# Patient Record
Sex: FEMALE | Race: WHITE | NOT HISPANIC OR LATINO | Employment: FULL TIME | URBAN - METROPOLITAN AREA
[De-identification: names, ages, dates, MRNs, and addresses within clinical notes are randomized per-mention and may not be internally consistent; named-entity substitution may affect disease eponyms.]

---

## 2017-01-24 ENCOUNTER — HOSPITAL ENCOUNTER (EMERGENCY)
Facility: HOSPITAL | Age: 22
Discharge: HOME | End: 2017-01-24
Attending: EMERGENCY MEDICINE | Admitting: EMERGENCY MEDICINE
Payer: COMMERCIAL

## 2017-01-24 ENCOUNTER — APPOINTMENT (EMERGENCY)
Dept: RADIOLOGY | Facility: HOSPITAL | Age: 22
End: 2017-01-24
Payer: COMMERCIAL

## 2017-01-24 VITALS
SYSTOLIC BLOOD PRESSURE: 131 MMHG | HEART RATE: 81 BPM | DIASTOLIC BLOOD PRESSURE: 74 MMHG | WEIGHT: 195.55 LBS | TEMPERATURE: 97.3 F | RESPIRATION RATE: 16 BRPM | OXYGEN SATURATION: 100 %

## 2017-01-24 DIAGNOSIS — R00.2 PALPITATIONS: ICD-10-CM

## 2017-01-24 DIAGNOSIS — R07.9 CHEST PAIN, UNSPECIFIED TYPE: Primary | ICD-10-CM

## 2017-01-24 DIAGNOSIS — R06.00 DYSPNEA, UNSPECIFIED TYPE: ICD-10-CM

## 2017-01-24 LAB
ANION GAP SERPL CALCULATED.3IONS-SCNC: 11 MMOL/L (ref 4–13)
ATRIAL RATE: 101 BPM
BUN SERPL-MCNC: 14 MG/DL (ref 5–25)
CALCIUM SERPL-MCNC: 9.3 MG/DL (ref 8.3–10.1)
CHLORIDE SERPL-SCNC: 104 MMOL/L (ref 100–108)
CO2 SERPL-SCNC: 24 MMOL/L (ref 21–32)
CREAT SERPL-MCNC: 0.86 MG/DL (ref 0.6–1.3)
DEPRECATED D DIMER PPP: <270 NG/ML (FEU) (ref 0–424)
GFR SERPL CREATININE-BSD FRML MDRD: >60 ML/MIN/1.73SQ M
GLUCOSE SERPL-MCNC: 89 MG/DL (ref 65–140)
P AXIS: 34 DEGREES
POTASSIUM SERPL-SCNC: 4.1 MMOL/L (ref 3.5–5.3)
PR INTERVAL: 144 MS
QRS AXIS: 44 DEGREES
QRSD INTERVAL: 80 MS
QT INTERVAL: 334 MS
QTC INTERVAL: 422 MS
SODIUM SERPL-SCNC: 139 MMOL/L (ref 136–145)
T WAVE AXIS: 21 DEGREES
TROPONIN I SERPL-MCNC: <0.02 NG/ML
VENTRICULAR RATE: 96 BPM

## 2017-01-24 PROCEDURE — 84484 ASSAY OF TROPONIN QUANT: CPT | Performed by: EMERGENCY MEDICINE

## 2017-01-24 PROCEDURE — 99285 EMERGENCY DEPT VISIT HI MDM: CPT

## 2017-01-24 PROCEDURE — 93005 ELECTROCARDIOGRAM TRACING: CPT | Performed by: EMERGENCY MEDICINE

## 2017-01-24 PROCEDURE — 71020 HB CHEST X-RAY 2VW FRONTAL&LATL: CPT

## 2017-01-24 PROCEDURE — 85379 FIBRIN DEGRADATION QUANT: CPT | Performed by: EMERGENCY MEDICINE

## 2017-01-24 PROCEDURE — 80048 BASIC METABOLIC PNL TOTAL CA: CPT | Performed by: EMERGENCY MEDICINE

## 2017-01-24 PROCEDURE — 36415 COLL VENOUS BLD VENIPUNCTURE: CPT | Performed by: EMERGENCY MEDICINE

## 2017-01-24 RX ORDER — SPIRONOLACTONE 100 MG/1
100 TABLET, FILM COATED ORAL DAILY
COMMUNITY
End: 2021-02-17

## 2017-01-24 RX ORDER — NORETHINDRONE ACETATE AND ETHINYL ESTRADIOL 1; .02 MG/1; MG/1
1 TABLET ORAL DAILY
COMMUNITY
End: 2021-02-17

## 2017-05-01 ENCOUNTER — ALLSCRIPTS OFFICE VISIT (OUTPATIENT)
Dept: OTHER | Facility: OTHER | Age: 22
End: 2017-05-01

## 2017-05-13 ENCOUNTER — HOSPITAL ENCOUNTER (EMERGENCY)
Facility: HOSPITAL | Age: 22
Discharge: HOME | End: 2017-05-13
Attending: EMERGENCY MEDICINE | Admitting: EMERGENCY MEDICINE
Payer: COMMERCIAL

## 2017-05-13 VITALS
WEIGHT: 205 LBS | HEIGHT: 66 IN | SYSTOLIC BLOOD PRESSURE: 136 MMHG | DIASTOLIC BLOOD PRESSURE: 82 MMHG | HEART RATE: 91 BPM | BODY MASS INDEX: 32.95 KG/M2 | OXYGEN SATURATION: 99 % | RESPIRATION RATE: 18 BRPM | TEMPERATURE: 98.6 F

## 2017-05-13 DIAGNOSIS — J04.0 LARYNGITIS: Primary | ICD-10-CM

## 2017-05-13 DIAGNOSIS — J06.9 VIRAL URI: ICD-10-CM

## 2017-05-13 PROCEDURE — 99283 EMERGENCY DEPT VISIT LOW MDM: CPT

## 2017-05-13 RX ADMIN — DEXAMETHASONE SODIUM PHOSPHATE 10 MG: 10 INJECTION, SOLUTION INTRAMUSCULAR; INTRAVENOUS at 21:35

## 2018-01-14 VITALS
TEMPERATURE: 98.2 F | HEART RATE: 86 BPM | WEIGHT: 208.11 LBS | DIASTOLIC BLOOD PRESSURE: 88 MMHG | HEIGHT: 65 IN | SYSTOLIC BLOOD PRESSURE: 124 MMHG | BODY MASS INDEX: 34.67 KG/M2

## 2018-12-07 PROCEDURE — 99284 EMERGENCY DEPT VISIT MOD MDM: CPT

## 2018-12-08 ENCOUNTER — HOSPITAL ENCOUNTER (EMERGENCY)
Facility: HOSPITAL | Age: 23
Discharge: HOME | End: 2018-12-08
Attending: EMERGENCY MEDICINE | Admitting: EMERGENCY MEDICINE

## 2018-12-08 VITALS
OXYGEN SATURATION: 100 % | DIASTOLIC BLOOD PRESSURE: 90 MMHG | SYSTOLIC BLOOD PRESSURE: 139 MMHG | RESPIRATION RATE: 20 BRPM | TEMPERATURE: 98 F | HEART RATE: 74 BPM

## 2018-12-08 DIAGNOSIS — N93.9 VAGINAL BLEEDING: Primary | ICD-10-CM

## 2018-12-08 DIAGNOSIS — R11.0 NAUSEA: ICD-10-CM

## 2018-12-08 DIAGNOSIS — R10.9 ABDOMINAL CRAMPING: ICD-10-CM

## 2018-12-08 LAB
ANION GAP BLD CALC-SCNC: 17 MMOL/L (ref 4–13)
BACTERIA UR QL AUTO: ABNORMAL /HPF
BILIRUB UR QL STRIP: NEGATIVE
BUN BLD-MCNC: 10 MG/DL (ref 5–25)
CA-I BLD-SCNC: 1.18 MMOL/L (ref 1.12–1.32)
CHLORIDE BLD-SCNC: 103 MMOL/L (ref 100–108)
CLARITY UR: CLEAR
COLOR UR: YELLOW
COLOR, POC: NORMAL
CREAT BLD-MCNC: 0.6 MG/DL (ref 0.6–1.3)
EXT PREG TEST URINE: NEGATIVE
GFR SERPL CREATININE-BSD FRML MDRD: 127 ML/MIN/1.73SQ M
GLUCOSE SERPL-MCNC: 77 MG/DL (ref 65–140)
GLUCOSE UR STRIP-MCNC: NEGATIVE MG/DL
HCT VFR BLD CALC: 42 % (ref 34.8–46.1)
HGB BLDA-MCNC: 14.3 G/DL (ref 11.5–15.4)
HGB UR QL STRIP.AUTO: ABNORMAL
HYALINE CASTS #/AREA URNS LPF: ABNORMAL /LPF
KETONES UR STRIP-MCNC: NEGATIVE MG/DL
LEUKOCYTE ESTERASE UR QL STRIP: NEGATIVE
NITRITE UR QL STRIP: NEGATIVE
NON-SQ EPI CELLS URNS QL MICRO: ABNORMAL /HPF
PCO2 BLD: 24 MMOL/L (ref 21–32)
PH UR STRIP.AUTO: 7 [PH] (ref 4.5–8)
POTASSIUM BLD-SCNC: 3.4 MMOL/L (ref 3.5–5.3)
PROT UR STRIP-MCNC: NEGATIVE MG/DL
RBC #/AREA URNS AUTO: ABNORMAL /HPF
SODIUM BLD-SCNC: 139 MMOL/L (ref 136–145)
SP GR UR STRIP.AUTO: 1.02 (ref 1–1.03)
SPECIMEN SOURCE: ABNORMAL
UROBILINOGEN UR QL STRIP.AUTO: 1 E.U./DL
WBC #/AREA URNS AUTO: ABNORMAL /HPF

## 2018-12-08 PROCEDURE — 81001 URINALYSIS AUTO W/SCOPE: CPT

## 2018-12-08 PROCEDURE — 81025 URINE PREGNANCY TEST: CPT | Performed by: EMERGENCY MEDICINE

## 2018-12-08 PROCEDURE — 80047 BASIC METABLC PNL IONIZED CA: CPT

## 2018-12-08 PROCEDURE — 85014 HEMATOCRIT: CPT

## 2018-12-08 RX ORDER — NAPROXEN 500 MG/1
250 TABLET ORAL 2 TIMES DAILY WITH MEALS
Qty: 14 TABLET | Refills: 0 | Status: SHIPPED | OUTPATIENT
Start: 2018-12-08 | End: 2019-08-03

## 2018-12-08 RX ORDER — ONDANSETRON 4 MG/1
4 TABLET, FILM COATED ORAL EVERY 8 HOURS PRN
Qty: 12 TABLET | Refills: 0 | Status: SHIPPED | OUTPATIENT
Start: 2018-12-08 | End: 2019-08-03

## 2018-12-08 NOTE — DISCHARGE INSTRUCTIONS
Take medications as directed  Stay well hydrated  Follow up with primary care physician and OBGYN for further evaluation of presenting symptoms  Return to ED if new or worsening symptoms for immediate reevaluation  Menorrhagia   WHAT YOU NEED TO KNOW:   Menorrhagia is heavy menstrual bleeding for more than 7 days or severe menstrual bleeding for less than 7 days  Your menstrual bleeding and cramping are so heavy that you have trouble doing your usual daily activities  Your monthly period may also occur more often, and you may bleed between periods  Menorrhagia is common in adolescence and around menopause  DISCHARGE INSTRUCTIONS:   Call 911 for any of the following:   · You have chest pain and shortness of breath  · Your heart is fluttering or beating faster than usual for you  Return to the emergency department if:   · You feel dizzy when you stand  · You feel confused  · You have severe abdominal pain, nausea, and vomiting  · Your skin or the whites of your eyes turn yellow  Contact your healthcare provider if:   · You need to change your pad or tampon more than 1 time per hour, for several hours in a row  · You feel more weak and tired than usual      · You have new coldness in your hands and feet  · You have questions or concerns about your condition or care  Medicines: You may need any of the following:  · Iron supplements  may be given if your blood iron level decreases because of heavy bleeding  · NSAIDs , such as ibuprofen, treat menstrual cramps  This medicine is available with or without a doctor's order  NSAIDs can cause stomach bleeding or kidney problems in certain people  If you take blood thinner medicine, always ask your healthcare provider if NSAIDs are safe for you  Always read the medicine label and follow directions  · Hormones  help slow or stop your bleeding and make your monthly periods more regular   This medicine may be given as birth control pills or an intrauterine device (IUD)  · Take your medicine as directed  Contact your healthcare provider if you think your medicine is not helping or if you have side effects  Tell him of her if you are allergic to any medicine  Keep a list of the medicines, vitamins, and herbs you take  Include the amounts, and when and why you take them  Bring the list or the pill bottles to follow-up visits  Carry your medicine list with you in case of an emergency  Manage your symptoms:   · Keep a supply of pads or tampons with you at all times  If possible, stay close to a bathroom  · Apply heat  on your abdomen for 20 to 30 minutes every 2 hours for as many days as directed  Heat helps decrease pain and cramps  Follow up with your healthcare provider as directed: You may need regular pelvic exams with Pap smears to monitor your condition  Write down your questions so you remember to ask them during your visits  © 2017 [employer] Information is for End User's use only and may not be sold, redistributed or otherwise used for commercial purposes  All illustrations and images included in CareNotes® are the copyrighted property of [employer]  or Jessee Moroch[employer]  The above information is an  only  It is not intended as medical advice for individual conditions or treatments  Talk to your doctor, nurse or pharmacist before following any medical regimen to see if it is safe and effective for you

## 2018-12-08 NOTE — ED ATTENDING ATTESTATION
Adrian Johnson DO, saw and evaluated the patient  I have discussed the patient with the resident/non-physician practitioner and agree with the resident's/non-physician practitioner's findings, Plan of Care, and MDM as documented in the resident's/non-physician practitioner's note, except where noted  All available labs and Radiology studies were reviewed  At this point I agree with the current assessment done in the Emergency Department  I have conducted an independent evaluation of this patient a history and physical is as follows:      21 yo female presents for evaluation of vaginal bleeding starting 12/4/2018, worse starting 12/5/2018  Has been passing clots per vagina  Lower abd pain, cramping, 7/10, non radiating, no a/e factors  Associated with mild lightheadedness  Denies vaginal discharge, urinary sxs, f/c  No other c/o at this time  Denies SOB, CP     abd snd mild TTP diffusely across lower abdomen  No r/g bs+    Imp: AUB plan: upreg, check hgb  Pelvic exam, reassess  Declines medications for pain        Critical Care Time  CritCare Time    Procedures

## 2018-12-08 NOTE — ED PROVIDER NOTES
History  Chief Complaint   Patient presents with    Vaginal Bleeding     c/o vaginal bleeding and pain x 2 days, pt states blood is mixed with "chunks"  Pt also states she has been light headed  21 yo F presents to ED for evaluation of vaginal bleeding that began 12/4 and has worsened since that time  Pt states now passing large clots with associated generalized lower quadrant abdominal cramping pain  Pt states she presented tonight after episode of feeling lightheaded  Pt states she has been on OCPs but missed two weeks restarting recently  Pt denies any trauma/injury, HA, visual changes, dizziness/syncope, neck/back pain, chest pain, dyspnea, cough/cold symptoms, fever/chills, N/V/D, urinary symptoms, blood in stool, purulent vaginal discharge, rash, peripheral edema or focal neuro deficits  Pt has PMH of HTN and PCOS, no significant PSH  Pt is a nonsmoker, denies any ETOH or recreational drug use  No interventions prior to arrival, no other complaints at this time  Prior to Admission Medications   Prescriptions Last Dose Informant Patient Reported? Taking?   norethindrone-ethinyl estradiol (MICROGESTIN) 1-20 MG-MCG per tablet   Yes Yes   Sig: Take 1 tablet by mouth daily   spironolactone (ALDACTONE) 100 mg tablet   Yes Yes   Sig: Take 100 mg by mouth daily      Facility-Administered Medications: None       Past Medical History:   Diagnosis Date    Hypertension     PCOS (polycystic ovarian syndrome)        History reviewed  No pertinent surgical history  History reviewed  No pertinent family history  I have reviewed and agree with the history as documented  Social History   Substance Use Topics    Smoking status: Never Smoker    Smokeless tobacco: Never Used    Alcohol use No        Review of Systems   Constitutional: Negative for chills, fatigue and fever  HENT: Negative for congestion, rhinorrhea and sore throat  Eyes: Negative for pain, redness and visual disturbance  Respiratory: Negative for cough, chest tightness, shortness of breath, wheezing and stridor  Cardiovascular: Negative for chest pain, palpitations and leg swelling  Gastrointestinal: Positive for abdominal pain (generalized lower quadrant abdominal pain)  Negative for blood in stool, constipation, diarrhea, nausea and vomiting  Genitourinary: Positive for vaginal bleeding  Negative for dysuria, flank pain, frequency, hematuria, urgency and vaginal discharge  Musculoskeletal: Negative for back pain and neck pain  Skin: Negative for pallor and rash  Neurological: Positive for light-headedness  Negative for dizziness, seizures, syncope, weakness and headaches  Psychiatric/Behavioral: Negative for agitation and confusion  Physical Exam  ED Triage Vitals [12/07/18 2246]   Temperature Pulse Respirations Blood Pressure SpO2   98 °F (36 7 °C) 84 16 145/90 100 %      Temp Source Heart Rate Source Patient Position - Orthostatic VS BP Location FiO2 (%)   Oral Monitor Sitting Right arm --      Pain Score       6           Orthostatic Vital Signs  Vitals:    12/07/18 2246 12/08/18 0008 12/08/18 0205   BP: 145/90 129/79 139/90   Pulse: 84 74 74   Patient Position - Orthostatic VS: Sitting Lying        Physical Exam   Constitutional: She is oriented to person, place, and time  She appears well-developed and well-nourished  No distress  HENT:   Head: Normocephalic and atraumatic  Nose: Nose normal    Mouth/Throat: Oropharynx is clear and moist  No oropharyngeal exudate  Eyes: Pupils are equal, round, and reactive to light  Conjunctivae and EOM are normal  Right eye exhibits no discharge  Left eye exhibits no discharge  Neck: Normal range of motion  Neck supple  No JVD present  No tracheal deviation present  Cardiovascular: Normal rate, regular rhythm, normal heart sounds and intact distal pulses  Exam reveals no gallop and no friction rub  No murmur heard    Pulmonary/Chest: Effort normal and breath sounds normal  No stridor  No respiratory distress  She has no wheezes  She has no rales  She exhibits no tenderness  Abdominal: Soft  Bowel sounds are normal  She exhibits no distension  There is tenderness (mild generalized lower quadrant tenderness)  There is no rebound and no guarding  Genitourinary:   Genitourinary Comments: No vaginal lacerations, blood/small clots noted in vault, normal cervix   Musculoskeletal: Normal range of motion  She exhibits no edema, tenderness or deformity  Neurological: She is alert and oriented to person, place, and time  No cranial nerve deficit  Skin: Skin is warm and dry  Capillary refill takes less than 2 seconds  No rash noted  She is not diaphoretic  Psychiatric: She has a normal mood and affect  Nursing note and vitals reviewed        ED Medications  Medications - No data to display    Diagnostic Studies  Results Reviewed     Procedure Component Value Units Date/Time    POCT Chem 8+ [[id number]]  (Abnormal) Collected:  12/08/18 0109    Lab Status:  Final result Updated:  12/08/18 0114     SODIUM, I-STAT 139 mmol/l      Potassium, i-STAT 3 4 (L) mmol/L      Chloride, istat 103 mmol/L      CO2, i-STAT 24 mmol/L      Anion Gap, i-STAT 17 (H) mmol/L      Calcium, Ionized i-STAT 1 18 mmol/L      BUN, I-STAT 10 mg/dl      Creatinine, i-STAT 0 6 mg/dl      eGFR 127 ml/min/1 73sq m      Glucose, i-STAT 77 mg/dl      Hct, i-STAT 42 %      Hgb, i-STAT 14 3 g/dl      Specimen Type VENOUS    Urine Microscopic [[id number]]  (Abnormal) Collected:  12/08/18 0038    Lab Status:  Final result Specimen:  Urine from Urine, Clean Catch Updated:  12/08/18 0053     RBC, UA 30-50 (A) /hpf      WBC, UA None Seen /hpf      Epithelial Cells None Seen /hpf      Bacteria, UA None Seen /hpf      Hyaline Casts, UA None Seen /lpf     POCT urinalysis dipstick [[id number]]  (Normal) Resulted:  12/08/18 0044    Lab Status:  Final result Specimen:  Urine Updated:  12/08/18 0044     Color, UA see results    POCT pregnancy, urine [[id number]]  (Normal) Resulted:  12/08/18 0044    Lab Status:  Final result Updated:  12/08/18 0044     EXT PREG TEST UR (Ref: Negative) negative    ED Urine Macroscopic [[id number]]  (Abnormal) Collected:  12/08/18 0038    Lab Status:  Final result Specimen:  Urine Updated:  12/08/18 0038     Color, UA Yellow     Clarity, UA Clear     pH, UA 7 0     Leukocytes, UA Negative     Nitrite, UA Negative     Protein, UA Negative mg/dl      Glucose, UA Negative mg/dl      Ketones, UA Negative mg/dl      Urobilinogen, UA 1 0 E U /dl      Bilirubin, UA Negative     Blood, UA Moderate (A)     Specific [place], UA 1 020    Narrative:       CLINITEK RESULT                 No orders to display         Procedures  Procedures      Phone Consults  ED Phone Contact    ED Course                Patient reevaluated with improvement in condition noted  Patient updated on results of tests  Discharge instructions given including medications, follow-up and return precautions with understanding verbalized  MDM  CritCare Time    Disposition  Final diagnoses:   Vaginal bleeding   Abdominal cramping   Nausea     Time reflects when diagnosis was documented in both MDM as applicable and the Disposition within this note     Time User Action Codes Description Comment    12/8/2018  1:59 AM Dandre Bass Add [N93 9] Vaginal bleeding     12/8/2018  1:59 AM Ana Rosa Bass Add [R10 9] Abdominal cramping     12/8/2018  2:00 AM Ana Rosa Bass Add [R11 0] Nausea       ED Disposition     ED Disposition Condition Comment    Discharge  [address] discharge to home/self care  Condition at discharge: Stable        Follow-up Information     Follow up With Specialties Details Why Contact Info Additional [address] [facility], [address] [facility] Internal Medicine, Nurse Practitioner  As needed [phone] e [address] [facility] [place] [address] and Gynecology  for further evaluation of presenting symptoms [address] [id number]  [address] [place] [address], [phone]          Discharge Medication List as of 12/8/2018  2:02 AM      START taking these medications    Details   naproxen (NAPROSYN) 500 mg tablet Take 0 5 tablets (250 mg total) by mouth 2 (two) times a day with meals, Starting Sat 12/8/2018, Print      ondansetron (ZOFRAN) 4 mg tablet Take 1 tablet (4 mg total) by mouth every 8 (eight) hours as needed for nausea or vomiting, Starting Sat 12/8/2018, Print         CONTINUE these medications which have NOT CHANGED    Details   norethindrone-ethinyl estradiol (MICROGESTIN) 1-20 MG-MCG per tablet Take 1 tablet by mouth daily, Until Discontinued, Historical Med      spironolactone (ALDACTONE) 100 mg tablet Take 100 mg by mouth daily, Until Discontinued, Historical Med           No discharge procedures on file  ED Provider  Attending physically available and evaluated Kimmie Goyal I managed the patient along with the ED Attending      Electronically Signed by         Shell Pearson DO  12/12/18 3136

## 2018-12-08 NOTE — ED NOTES
Asked pt  To provide urine sample  She was okay with it and went to the bathroom   Urine sample provided just in case     Wen Cordon  12/08/18 0016

## 2019-08-03 ENCOUNTER — HOSPITAL ENCOUNTER (EMERGENCY)
Facility: HOSPITAL | Age: 24
Discharge: HOME | End: 2019-08-03
Attending: EMERGENCY MEDICINE | Admitting: EMERGENCY MEDICINE
Payer: COMMERCIAL

## 2019-08-03 ENCOUNTER — APPOINTMENT (EMERGENCY)
Dept: NON INVASIVE DIAGNOSTICS | Facility: HOSPITAL | Age: 24
End: 2019-08-03
Payer: COMMERCIAL

## 2019-08-03 VITALS
SYSTOLIC BLOOD PRESSURE: 108 MMHG | WEIGHT: 229.28 LBS | HEART RATE: 85 BPM | HEIGHT: 66 IN | DIASTOLIC BLOOD PRESSURE: 55 MMHG | TEMPERATURE: 98.2 F | OXYGEN SATURATION: 95 % | RESPIRATION RATE: 20 BRPM | BODY MASS INDEX: 36.85 KG/M2

## 2019-08-03 DIAGNOSIS — M79.604 BILATERAL LEG PAIN: Primary | ICD-10-CM

## 2019-08-03 DIAGNOSIS — M79.605 BILATERAL LEG PAIN: Primary | ICD-10-CM

## 2019-08-03 DIAGNOSIS — M79.89 LEFT LEG SWELLING: ICD-10-CM

## 2019-08-03 DIAGNOSIS — M79.89 RIGHT LEG SWELLING: ICD-10-CM

## 2019-08-03 PROCEDURE — 93971 EXTREMITY STUDY: CPT

## 2019-08-03 PROCEDURE — 99284 EMERGENCY DEPT VISIT MOD MDM: CPT

## 2019-08-03 PROCEDURE — 99284 EMERGENCY DEPT VISIT MOD MDM: CPT | Performed by: EMERGENCY MEDICINE

## 2019-08-03 NOTE — ED PROVIDER NOTES
History  Chief Complaint   Patient presents with    Leg Pain     patient c/o left lower leg pain and swelling, denies hx of blood clots, sob or cp     HPI    31yo female HTN (noncompliant with antihypertensives), PCOS (on OCP) presents for evaluation leg swelling  Patient says last night she noticed b/l leg swelling, worst in LLE  Swelling extends to the knee  She notes calf pain since last night b/l  She notes pain is cramping-like, heavy  Patient says she has been able to walk but feels like she is "dragging her feet " She denies having similar symptoms before last night  She denies history of DVT/PE  She denies history of travel  LMP 7/31/19  Denies fever, chills, chest pain, shortness of breath, nausea, vomiting, abdominal pain, diarrhea, dysuria, numbness/tingling, gait difficulty or extremity weakness  Daily tobacco use     No recreational use  No Etoh use     Prior to Admission Medications   Prescriptions Last Dose Informant Patient Reported? Taking?   norethindrone-ethinyl estradiol (MICROGESTIN) 1-20 MG-MCG per tablet   Yes Yes   Sig: Take 1 tablet by mouth daily   spironolactone (ALDACTONE) 100 mg tablet Not Taking at Unknown time  Yes No   Sig: Take 100 mg by mouth daily      Facility-Administered Medications: None       Past Medical History:   Diagnosis Date    Hypertension     PCOS (polycystic ovarian syndrome)        History reviewed  No pertinent surgical history  History reviewed  No pertinent family history  I have reviewed and agree with the history as documented  Social History     Tobacco Use    Smoking status: Current Some Day Smoker    Smokeless tobacco: Never Used   Substance Use Topics    Alcohol use: No    Drug use: No        Review of Systems   Constitutional: Negative for chills, diaphoresis, fatigue and fever  HENT: Negative for congestion, rhinorrhea and sore throat  Eyes: Negative for photophobia and visual disturbance     Respiratory: Negative for cough, chest tightness and shortness of breath  Cardiovascular: Positive for leg swelling  Negative for chest pain and palpitations  Gastrointestinal: Negative for abdominal pain, blood in stool, constipation, diarrhea, nausea and vomiting  Genitourinary: Negative for dysuria, frequency and hematuria  Musculoskeletal: Negative for back pain, gait problem, joint swelling, neck pain and neck stiffness  Skin: Negative for pallor and rash  Neurological: Negative for weakness, light-headedness, numbness and headaches  Hematological: Negative for adenopathy  Does not bruise/bleed easily  All other systems reviewed and are negative  Physical Exam  ED Triage Vitals [08/03/19 1510]   Temperature Pulse Respirations Blood Pressure SpO2   98 2 °F (36 8 °C) (!) 118 20 108/72 100 %      Temp Source Heart Rate Source Patient Position - Orthostatic VS BP Location FiO2 (%)   Tympanic Monitor Lying Right arm --      Pain Score       Worst Possible Pain             Orthostatic Vital Signs  Vitals:    08/03/19 1510 08/03/19 1816   BP: 108/72 108/55   Pulse: (!) 118 85   Patient Position - Orthostatic VS: Lying Lying       Physical Exam   Constitutional: She is oriented to person, place, and time  She appears well-developed and well-nourished  No distress  Patient is alert and oriented, appears well and nontoxic, in no acute distress   HENT:   Head: Normocephalic and atraumatic  Mouth/Throat: Oropharynx is clear and moist  No oropharyngeal exudate  Eyes: Pupils are equal, round, and reactive to light  Conjunctivae and EOM are normal    Neck: Normal range of motion  Neck supple  Cardiovascular: Normal rate, regular rhythm, normal heart sounds and intact distal pulses  Pulmonary/Chest: Effort normal and breath sounds normal  No respiratory distress  Abdominal: Soft  Bowel sounds are normal  She exhibits no distension  There is no tenderness  Musculoskeletal: Normal range of motion  She exhibits edema     Mild pedal edema at ankles, soft, sensation and pulses intact   Soft calves, tender to palpation, no obvious swelling   Lymphadenopathy:     She has no cervical adenopathy  Neurological: She is alert and oriented to person, place, and time  Skin: Skin is warm and dry  Capillary refill takes less than 2 seconds  No rash noted  She is not diaphoretic  No erythema  No pallor  Psychiatric: She has a normal mood and affect  Her behavior is normal  Judgment and thought content normal    Nursing note and vitals reviewed  ED Medications  Medications - No data to display    Diagnostic Studies  Results Reviewed     None                 VAS lower limb venous duplex study, unilateral/limited    (Results Pending)         Procedures  Procedures        ED Course  ED Course as of Aug 03 1925   Sat Aug 03, 2019   1822 Per vascular tech, no DVT or superficial thrombosis found on duplex  MDM  Number of Diagnoses or Management Options  Bilateral leg pain:   Left leg swelling:   Right leg swelling:   Diagnosis management comments: 57-year-old female presents for evaluation of bilateral leg swelling and pain  Patient is hemodynamically stable and appears clinically well  Patient seems anxious about leg swelling, particularly left  Will order duplex      Disposition  Final diagnoses:   Bilateral leg pain   Left leg swelling   Right leg swelling     Time reflects when diagnosis was documented in both MDM as applicable and the Disposition within this note     Time User Action Codes Description Comment    8/3/2019  6:44 PM Los Spivey [A56 169,  M79 605] Bilateral leg pain     8/3/2019  6:45 PM Los Spivey [M79 89] Left leg swelling     8/3/2019  6:45 PM Howie Turner [M79 89] Right leg swelling       ED Disposition     ED Disposition Condition Date/Time Comment    Discharge Good Sat Aug 3, 2019  6:44 PM [address] discharge to home/self care              Follow-up Information Follow up With Specialties Details Why Contact Info Additional [phone] Family Medicine Schedule an appointment as soon as possible for a visit   [address] [place] [phone] DEBBY RODRIGUEZ EHODOHXQ [id number], [address] [place], [place], [phone]          Discharge Medication List as of 8/3/2019  6:46 PM      CONTINUE these medications which have NOT CHANGED    Details   norethindrone-ethinyl estradiol ([address]) 1-20 MG-MCG per tablet Take 1 tablet by mouth daily, Until Discontinued, Historical Med      spironolactone (ALDACTONE) 100 mg tablet Take 100 mg by mouth daily, Until Discontinued, Historical Med           No discharge procedures on file  ED Provider  Attending physically available and evaluated Izabela Burgos I managed the patient along with the ED Attending      Electronically Signed by         Brayden Brock MD  08/03/19 5878

## 2019-08-04 PROCEDURE — 93971 EXTREMITY STUDY: CPT | Performed by: SURGERY

## 2019-08-06 NOTE — ED ATTENDING ATTESTATION
Flex Parmar DO, saw and evaluated the patient  I have discussed the patient with the resident/non-physician practitioner and agree with the resident's/non-physician practitioner's findings, Plan of Care, and MDM as documented in the resident's/non-physician practitioner's note, except where noted  All available labs and Radiology studies were reviewed  I was present for key portions of any procedure(s) performed by the resident/non-physician practitioner and I was immediately available to provide assistance  At this point I agree with the current assessment done in the Emergency Department  I have conducted an independent evaluation of this patient a history and physical is as follows:      30 yof  With bilateral lower extremity swelling  History of PCOS  No chest pain or shortness of breath  No rash were systemic symptoms  Plan is to check duplex for DVT although likely negative  Doubt CHF     reassurance      Critical Care Time  Procedures

## 2019-08-12 ENCOUNTER — HOSPITAL ENCOUNTER (EMERGENCY)
Facility: HOSPITAL | Age: 24
Discharge: HOME | End: 2019-08-12
Attending: EMERGENCY MEDICINE | Admitting: EMERGENCY MEDICINE
Payer: COMMERCIAL

## 2019-08-12 ENCOUNTER — APPOINTMENT (EMERGENCY)
Dept: RADIOLOGY | Facility: HOSPITAL | Age: 24
End: 2019-08-12
Payer: COMMERCIAL

## 2019-08-12 VITALS
BODY MASS INDEX: 35.51 KG/M2 | OXYGEN SATURATION: 100 % | SYSTOLIC BLOOD PRESSURE: 130 MMHG | WEIGHT: 220 LBS | DIASTOLIC BLOOD PRESSURE: 74 MMHG | RESPIRATION RATE: 14 BRPM | HEART RATE: 82 BPM | TEMPERATURE: 98 F

## 2019-08-12 DIAGNOSIS — R06.02 SOB (SHORTNESS OF BREATH): Primary | ICD-10-CM

## 2019-08-12 DIAGNOSIS — F41.9 ANXIETY: ICD-10-CM

## 2019-08-12 LAB
ALBUMIN SERPL BCP-MCNC: 3.2 G/DL (ref 3.5–5)
ALP SERPL-CCNC: 57 U/L (ref 46–116)
ALT SERPL W P-5'-P-CCNC: 26 U/L (ref 12–78)
ANION GAP SERPL CALCULATED.3IONS-SCNC: 10 MMOL/L (ref 4–13)
APTT PPP: 28 SECONDS (ref 24–33)
AST SERPL W P-5'-P-CCNC: 15 U/L (ref 5–45)
BASOPHILS # BLD AUTO: 0.09 THOUSANDS/ΜL (ref 0–0.1)
BASOPHILS NFR BLD AUTO: 1 % (ref 0–1)
BILIRUB SERPL-MCNC: 0.3 MG/DL (ref 0.2–1)
BILIRUB UR QL STRIP: NEGATIVE
BUN SERPL-MCNC: 15 MG/DL (ref 5–25)
CALCIUM SERPL-MCNC: 8.5 MG/DL (ref 8.3–10.1)
CHLORIDE SERPL-SCNC: 103 MMOL/L (ref 100–108)
CLARITY UR: CLEAR
CO2 SERPL-SCNC: 24 MMOL/L (ref 21–32)
COLOR UR: YELLOW
CREAT SERPL-MCNC: 0.82 MG/DL (ref 0.6–1.3)
EOSINOPHIL # BLD AUTO: 0.26 THOUSAND/ΜL (ref 0–0.61)
EOSINOPHIL NFR BLD AUTO: 2 % (ref 0–6)
ERYTHROCYTE [DISTWIDTH] IN BLOOD BY AUTOMATED COUNT: 12 % (ref 11.6–15.1)
EXT PREG TEST URINE: NEGATIVE
EXT. CONTROL ED NAV: NORMAL
GFR SERPL CREATININE-BSD FRML MDRD: 99 ML/MIN/1.73SQ M
GLUCOSE SERPL-MCNC: 87 MG/DL (ref 65–140)
GLUCOSE UR STRIP-MCNC: NEGATIVE MG/DL
HCT VFR BLD AUTO: 42.7 % (ref 34.8–46.1)
HGB BLD-MCNC: 14.2 G/DL (ref 11.5–15.4)
HGB UR QL STRIP.AUTO: NEGATIVE
IMM GRANULOCYTES # BLD AUTO: 0.03 THOUSAND/UL (ref 0–0.2)
IMM GRANULOCYTES NFR BLD AUTO: 0 % (ref 0–2)
INR PPP: 1.02 (ref 0.86–1.16)
KETONES UR STRIP-MCNC: NEGATIVE MG/DL
LEUKOCYTE ESTERASE UR QL STRIP: NEGATIVE
LYMPHOCYTES # BLD AUTO: 5.43 THOUSANDS/ΜL (ref 0.6–4.47)
LYMPHOCYTES NFR BLD AUTO: 38 % (ref 14–44)
MCH RBC QN AUTO: 28.2 PG (ref 26.8–34.3)
MCHC RBC AUTO-ENTMCNC: 33.3 G/DL (ref 31.4–37.4)
MCV RBC AUTO: 85 FL (ref 82–98)
MONOCYTES # BLD AUTO: 0.97 THOUSAND/ΜL (ref 0.17–1.22)
MONOCYTES NFR BLD AUTO: 7 % (ref 4–12)
NEUTROPHILS # BLD AUTO: 7.59 THOUSANDS/ΜL (ref 1.85–7.62)
NEUTS SEG NFR BLD AUTO: 52 % (ref 43–75)
NITRITE UR QL STRIP: NEGATIVE
NRBC BLD AUTO-RTO: 0 /100 WBCS
PH UR STRIP.AUTO: 6 [PH]
PLATELET # BLD AUTO: 335 THOUSANDS/UL (ref 149–390)
PMV BLD AUTO: 11.9 FL (ref 8.9–12.7)
POTASSIUM SERPL-SCNC: 3.5 MMOL/L (ref 3.5–5.3)
PROT SERPL-MCNC: 7.4 G/DL (ref 6.4–8.2)
PROT UR STRIP-MCNC: NEGATIVE MG/DL
PROTHROMBIN TIME: 10.7 SECONDS (ref 9.4–11.7)
RBC # BLD AUTO: 5.03 MILLION/UL (ref 3.81–5.12)
SODIUM SERPL-SCNC: 137 MMOL/L (ref 136–145)
SP GR UR STRIP.AUTO: 1.02 (ref 1–1.03)
TROPONIN I SERPL-MCNC: <0.02 NG/ML
TSH SERPL DL<=0.05 MIU/L-ACNC: 3.54 UIU/ML (ref 0.36–3.74)
UROBILINOGEN UR QL STRIP.AUTO: 0.2 E.U./DL
WBC # BLD AUTO: 14.37 THOUSAND/UL (ref 4.31–10.16)

## 2019-08-12 PROCEDURE — 81003 URINALYSIS AUTO W/O SCOPE: CPT | Performed by: EMERGENCY MEDICINE

## 2019-08-12 PROCEDURE — 94640 AIRWAY INHALATION TREATMENT: CPT

## 2019-08-12 PROCEDURE — 85730 THROMBOPLASTIN TIME PARTIAL: CPT | Performed by: EMERGENCY MEDICINE

## 2019-08-12 PROCEDURE — 96374 THER/PROPH/DIAG INJ IV PUSH: CPT

## 2019-08-12 PROCEDURE — 84484 ASSAY OF TROPONIN QUANT: CPT | Performed by: EMERGENCY MEDICINE

## 2019-08-12 PROCEDURE — 81025 URINE PREGNANCY TEST: CPT | Performed by: EMERGENCY MEDICINE

## 2019-08-12 PROCEDURE — 80053 COMPREHEN METABOLIC PANEL: CPT | Performed by: EMERGENCY MEDICINE

## 2019-08-12 PROCEDURE — 93005 ELECTROCARDIOGRAM TRACING: CPT

## 2019-08-12 PROCEDURE — 84443 ASSAY THYROID STIM HORMONE: CPT | Performed by: EMERGENCY MEDICINE

## 2019-08-12 PROCEDURE — 36415 COLL VENOUS BLD VENIPUNCTURE: CPT | Performed by: EMERGENCY MEDICINE

## 2019-08-12 PROCEDURE — 99285 EMERGENCY DEPT VISIT HI MDM: CPT

## 2019-08-12 PROCEDURE — 71045 X-RAY EXAM CHEST 1 VIEW: CPT

## 2019-08-12 PROCEDURE — 85025 COMPLETE CBC W/AUTO DIFF WBC: CPT | Performed by: EMERGENCY MEDICINE

## 2019-08-12 PROCEDURE — 85610 PROTHROMBIN TIME: CPT | Performed by: EMERGENCY MEDICINE

## 2019-08-12 RX ORDER — ALPRAZOLAM 0.5 MG/1
0.5 TABLET ORAL 3 TIMES DAILY PRN
Qty: 20 TABLET | Refills: 0 | Status: SHIPPED | OUTPATIENT
Start: 2019-08-12 | End: 2021-03-06 | Stop reason: HOSPADM

## 2019-08-12 RX ORDER — ALPRAZOLAM 0.25 MG/1
0.5 TABLET ORAL 3 TIMES DAILY PRN
Qty: 20 TABLET | Refills: 0 | Status: SHIPPED | OUTPATIENT
Start: 2019-08-12 | End: 2019-08-12 | Stop reason: SDUPTHER

## 2019-08-12 RX ORDER — IPRATROPIUM BROMIDE AND ALBUTEROL SULFATE 2.5; .5 MG/3ML; MG/3ML
3 SOLUTION RESPIRATORY (INHALATION) ONCE
Status: COMPLETED | OUTPATIENT
Start: 2019-08-12 | End: 2019-08-12

## 2019-08-12 RX ORDER — LORAZEPAM 2 MG/ML
0.5 INJECTION INTRAMUSCULAR ONCE
Status: COMPLETED | OUTPATIENT
Start: 2019-08-12 | End: 2019-08-12

## 2019-08-12 RX ADMIN — IPRATROPIUM BROMIDE AND ALBUTEROL SULFATE 3 ML: 2.5; .5 SOLUTION RESPIRATORY (INHALATION) at 17:53

## 2019-08-12 RX ADMIN — LORAZEPAM 0.5 MG: 2 INJECTION INTRAMUSCULAR; INTRAVENOUS at 17:53

## 2019-08-12 NOTE — ED PROVIDER NOTES
History  Chief Complaint   Patient presents with    Shortness of Breath     has had SOB for a week and was seen in [place]  sent home and was cleared  pt states its much worse today     31 yo female c/o worsening sob off and on x one week  Seen at [facility] 9 days ago for leg swelling and had negative doppler studies of both legs  Then seen at PMD and found to have high BP and heart rate so put on propanolol and sent for outpt  CT scan of chest with dye which was normal per pt  She c/o sob got much worse yesterday  + associated chest heaviness and tightness  + dizziness and feels like heart is racing  Feels like she can't focus  + smoker and h/o HTN - had been on spironolactone in the past but was noncompliant   + h/o asthma but doesn't have inhaler  No DM  No recent fever, cough, vomiting, syncope, travel  Pt  Denies stress but family member took me aside and expressed concern that pt  Is very anxious and under stress and feels like she is going to die  Pt  Says she feels like something is wrong and we are not finding it  History provided by:  Patient   used: No        Prior to Admission Medications   Prescriptions Last Dose Informant Patient Reported? Taking?   norethindrone-ethinyl estradiol (MICROGESTIN) 1-20 MG-MCG per tablet   Yes No   Sig: Take 1 tablet by mouth daily   spironolactone (ALDACTONE) 100 mg tablet   Yes No   Sig: Take 100 mg by mouth daily      Facility-Administered Medications: None       Past Medical History:   Diagnosis Date    Hypertension     PCOS (polycystic ovarian syndrome)        History reviewed  No pertinent surgical history  History reviewed  No pertinent family history  I have reviewed and agree with the history as documented      Social History     Tobacco Use    Smoking status: Current Some Day Smoker    Smokeless tobacco: Never Used   Substance Use Topics    Alcohol use: No    Drug use: No        Review of Systems Constitutional: Negative  Negative for fever  HENT: Negative  Eyes: Negative  Respiratory: Positive for shortness of breath  Negative for cough  Cardiovascular: Positive for chest pain, palpitations and leg swelling  Gastrointestinal: Negative  Negative for abdominal pain, diarrhea, nausea and vomiting  Genitourinary: Negative  Negative for dysuria and flank pain  Musculoskeletal: Negative  Negative for back pain and myalgias  Skin: Negative  Negative for rash and wound  Neurological: Positive for dizziness  Negative for headaches  Hematological: Does not bruise/bleed easily  Psychiatric/Behavioral: Negative  All other systems reviewed and are negative  Physical Exam  Physical Exam   Constitutional: She appears well-developed and well-nourished  Non-toxic appearance  She does not appear ill  No distress  HENT:   Head: Normocephalic and atraumatic  Eyes: Conjunctivae are normal  No scleral icterus  Neck: Normal range of motion  Neck supple  Cardiovascular: Normal rate, regular rhythm and normal heart sounds  No murmur heard  Pulmonary/Chest: Effort normal and breath sounds normal  No respiratory distress  Abdominal: Soft  Bowel sounds are normal  She exhibits no distension  There is no tenderness  Musculoskeletal: Normal range of motion  She exhibits no edema or deformity  Neurological: She is alert  No cranial nerve deficit  Skin: Skin is warm and dry  No rash noted  She is not diaphoretic  No pallor  Psychiatric: Her mood appears anxious  Pt  Seems anxious, upset, eyelid fluttering, poor eye contact   Nursing note and vitals reviewed        Vital Signs  ED Triage Vitals   Temperature Pulse Respirations Blood Pressure SpO2   08/12/19 1703 08/12/19 1703 08/12/19 1703 08/12/19 1703 08/12/19 1703   98 °F (36 7 °C) 71 (!) 24 132/85 100 %      Temp src Heart Rate Source Patient Position - Orthostatic VS BP Location FiO2 (%)   -- 08/12/19 1800 -- -- -- Monitor         Pain Score       08/12/19 1703       Worst Possible Pain           Vitals:    08/12/19 1815 08/12/19 1830 08/12/19 1845 08/12/19 1915   BP: 128/75 133/79 137/83    Pulse: 76 74  94         Visual Acuity      ED Medications  Medications   LORazepam (ATIVAN) 2 mg/mL injection 0 5 mg (0 5 mg Intravenous Given 8/12/19 1753)   ipratropium-albuterol (DUO-NEB) 0 5-2 5 mg/3 mL inhalation solution 3 mL (3 mL Nebulization Given 8/12/19 1753)       Diagnostic Studies  Results Reviewed     Procedure Component Value Units Date/Time    TSH, 3rd generation with Free T4 reflex [[id number]]  (Normal) Collected:  08/12/19 1905    Lab Status:  Final result Specimen:  Blood from Arm, Left Updated:  08/12/19 1936     TSH 3RD GENERATON 3 541 uIU/mL     Narrative:       Patients undergoing fluorescein dye angiography may retain small amounts of fluorescein in the body for 48-72 hours post procedure  Samples containing fluorescein can produce falsely depressed TSH values  If the patient had this procedure,a specimen should be resubmitted post fluorescein clearance        Comprehensive metabolic panel [[id number]]  (Abnormal) Collected:  08/12/19 1905    Lab Status:  Final result Specimen:  Blood from Arm, Left Updated:  08/12/19 1927     Sodium 137 mmol/L      Potassium 3 5 mmol/L      Chloride 103 mmol/L      CO2 24 mmol/L      ANION GAP 10 mmol/L      BUN 15 mg/dL      Creatinine 0 82 mg/dL      Glucose 87 mg/dL      Calcium 8 5 mg/dL      AST 15 U/L      ALT 26 U/L      Alkaline Phosphatase 57 U/L      Total Protein 7 4 g/dL      Albumin 3 2 g/dL      Total Bilirubin 0 30 mg/dL      eGFR 99 ml/min/1 73sq m     Narrative:       D[place]ce guidelines for Chronic Kidney Disease (CKD):     Stage 1 with normal or high GFR (GFR > 90 mL/min/1 73 square meters)    Stage 2 Mild CKD (GFR = 60-89 mL/min/1 73 square meters)    Stage 3A Moderate CKD (GFR = 45-59 mL/min/1 73 square meters)    Stage 3B Moderate CKD (GFR = 30-44 mL/min/1 73 square meters)    Stage 4 Severe CKD (GFR = 15-29 mL/min/1 73 square meters)    Stage 5 End Stage CKD (GFR <15 mL/min/1 73 square meters)  Note: GFR calculation is accurate only with a steady state creatinine    Troponin I [[id number]]  (Normal) Collected:  08/12/19 1734    Lab Status:  Final result Specimen:  Blood from Arm, Right Updated:  08/12/19 1816     Troponin I <0 02 ng/mL     Protime-INR [[id number]]  (Normal) Collected:  08/12/19 1737    Lab Status:  Final result Specimen:  Blood from Arm, Right Updated:  08/12/19 1811     Protime 10 7 seconds      INR 1 02    APTT [[id number]]  (Normal) Collected:  08/12/19 1737    Lab Status:  Final result Specimen:  Blood from Arm, Right Updated:  08/12/19 1811     PTT 28 seconds     UA w Reflex to Microscopic [[id number]] Collected:  08/12/19 1754    Lab Status:  Final result Specimen:  Urine, Other Updated:  08/12/19 1803     Color, UA Yellow     Clarity, UA Clear     Specific [place], UA 1 020     pH, UA 6 0     Leukocytes, UA Negative     Nitrite, UA Negative     Protein, UA Negative mg/dl      Glucose, UA Negative mg/dl      Ketones, UA Negative mg/dl      Urobilinogen, UA 0 2 E U /dl      Bilirubin, UA Negative     Blood, UA Negative    CBC and differential [[id number]]  (Abnormal) Collected:  08/12/19 1734    Lab Status:  Final result Specimen:  Blood from Arm, Right Updated:  08/12/19 1756     WBC 14 37 Thousand/uL      RBC 5 03 Million/uL      Hemoglobin 14 2 g/dL      Hematocrit 42 7 %      MCV 85 fL      MCH 28 2 pg      MCHC 33 3 g/dL      RDW 12 0 %      MPV 11 9 fL      Platelets 313 Thousands/uL      nRBC 0 /100 WBCs      Neutrophils Relative 52 %      Immat GRANS % 0 %      Lymphocytes Relative 38 %      Monocytes Relative 7 %      Eosinophils Relative 2 %      Basophils Relative 1 %      Neutrophils Absolute 7 59 Thousands/µL      Immature Grans Absolute 0 03 Thousand/uL      Lymphocytes Absolute 5 43 Thousands/µL      Monocytes Absolute 0 97 Thousand/µL      Eosinophils Absolute 0 26 Thousand/µL      Basophils Absolute 0 09 Thousands/µL     POCT pregnancy, urine [[id number]]  (Normal) Resulted:  08/12/19 1754    Lab Status:  Final result Updated:  08/12/19 1754     EXT PREG TEST UR (Ref: Negative) negative     Control valid                 XR chest 1 view portable   ED Interpretation by Cameron Bianchi MD ([id number])   nad                 Procedures  ECG 12 Lead Documentation Only  Date/Time: 8/12/2019 5:33 PM  Performed by: Cameron Bianchi MD  Authorized by: Cameron Bianchi MD     Indications / Diagnosis:  Sob  ECG reviewed by me, the ED Provider: yes    Patient location:  ED  Previous ECG:     Previous ECG:  Compared to current    Similarity:  No change  Interpretation:     Interpretation: normal    Rate:     ECG rate:  72    ECG rate assessment: normal    Rhythm:     Rhythm: sinus rhythm    Ectopy:     Ectopy comment:  Sinus arrhythmia  QRS:     QRS axis:  Normal  Conduction:     Conduction: normal    ST segments:     ST segments:  Normal  T waves:     T waves: normal             ED Course                               MDM  Number of Diagnoses or Management Options  Anxiety:   SOB (shortness of breath):   Diagnosis management comments: 1940- pt  Feels a lot better after the ativan  Discussed testing at length and possibility that her symptoms may in part be related to stress and anxiety and pt  Agrees and admits that she is under a lot of stress right now  Will give short term xanax prescription and pt  Is seeing her PMD on Thursday and I advised ask about starting a daily antidepressant/anxiolytic and maybe question the BP medicine as ARB or ACE would usually be first line tx        Disposition  Final diagnoses:   SOB (shortness of breath)   Anxiety     Time reflects when diagnosis was documented in both MDM as applicable and the Disposition within this note     Time User Action Codes Description Comment    9/55/3221  1:61 PM Sulaiman Onel CID Add [R06 02] SOB (shortness of breath)     1/90/0542  4:73 PM Karyna Shashi Add [E48 2] Anxiety       ED Disposition     ED Disposition Condition Date/Time Comment    Discharge Stable Mon Aug 12, 2019  7:41 PM Alfred Bela discharge to home/self care  Follow-up Information    None         Patient's Medications   Discharge Prescriptions    ALPRAZOLAM (XANAX) 0 25 MG TABLET    Take 2 tablets (0 5 mg total) by mouth 3 (three) times a day as needed for anxiety or sleep for up to 10 days       Start Date: 8/12/2019 End Date: 8/22/2019       Order Dose: 0 5 mg       Quantity: 20 tablet    Refills: 0     No discharge procedures on file      ED Provider  Electronically Signed by           Hillary Talbot MD  54/16/39 3543       Hillary Talbot MD  92/52/56 3170

## 2019-08-13 LAB
ATRIAL RATE: 72 BPM
P AXIS: 22 DEGREES
PR INTERVAL: 152 MS
QRS AXIS: 41 DEGREES
QRSD INTERVAL: 86 MS
QT INTERVAL: 404 MS
QTC INTERVAL: 442 MS
T WAVE AXIS: 17 DEGREES
VENTRICULAR RATE: 72 BPM

## 2019-08-13 PROCEDURE — 93010 ELECTROCARDIOGRAM REPORT: CPT | Performed by: INTERNAL MEDICINE

## 2020-07-12 ENCOUNTER — APPOINTMENT (EMERGENCY)
Dept: RADIOLOGY | Facility: HOSPITAL | Age: 25
End: 2020-07-12
Payer: COMMERCIAL

## 2020-07-12 ENCOUNTER — HOSPITAL ENCOUNTER (EMERGENCY)
Facility: HOSPITAL | Age: 25
Discharge: HOME | End: 2020-07-12
Attending: EMERGENCY MEDICINE | Admitting: EMERGENCY MEDICINE
Payer: COMMERCIAL

## 2020-07-12 VITALS
RESPIRATION RATE: 20 BRPM | BODY MASS INDEX: 43.58 KG/M2 | SYSTOLIC BLOOD PRESSURE: 130 MMHG | OXYGEN SATURATION: 100 % | DIASTOLIC BLOOD PRESSURE: 83 MMHG | TEMPERATURE: 98 F | WEIGHT: 270 LBS | HEART RATE: 85 BPM

## 2020-07-12 DIAGNOSIS — M54.30 SCIATICA: Primary | ICD-10-CM

## 2020-07-12 LAB
CLARITY, POC: CLEAR
COLOR, POC: YELLOW
EXT PREG TEST URINE: NORMAL
EXT. CONTROL ED NAV: NORMAL

## 2020-07-12 PROCEDURE — 72100 X-RAY EXAM L-S SPINE 2/3 VWS: CPT

## 2020-07-12 PROCEDURE — 87591 N.GONORRHOEAE DNA AMP PROB: CPT | Performed by: EMERGENCY MEDICINE

## 2020-07-12 PROCEDURE — 87491 CHLMYD TRACH DNA AMP PROBE: CPT | Performed by: EMERGENCY MEDICINE

## 2020-07-12 PROCEDURE — 99284 EMERGENCY DEPT VISIT MOD MDM: CPT

## 2020-07-12 PROCEDURE — 81002 URINALYSIS NONAUTO W/O SCOPE: CPT | Performed by: EMERGENCY MEDICINE

## 2020-07-12 PROCEDURE — 99284 EMERGENCY DEPT VISIT MOD MDM: CPT | Performed by: EMERGENCY MEDICINE

## 2020-07-12 PROCEDURE — 81025 URINE PREGNANCY TEST: CPT | Performed by: EMERGENCY MEDICINE

## 2020-07-12 RX ORDER — PANTOPRAZOLE SODIUM 20 MG/1
20 TABLET, DELAYED RELEASE ORAL ONCE
Status: COMPLETED | OUTPATIENT
Start: 2020-07-12 | End: 2020-07-12

## 2020-07-12 RX ORDER — PREDNISONE 20 MG/1
40 TABLET ORAL DAILY
Qty: 8 TABLET | Refills: 0 | Status: SHIPPED | OUTPATIENT
Start: 2020-07-13 | End: 2020-07-17

## 2020-07-12 RX ORDER — PANTOPRAZOLE SODIUM 20 MG/1
20 TABLET, DELAYED RELEASE ORAL
Status: DISCONTINUED | OUTPATIENT
Start: 2020-07-13 | End: 2020-07-12

## 2020-07-12 RX ORDER — DIAZEPAM 5 MG/1
5 TABLET ORAL EVERY 8 HOURS PRN
Qty: 10 TABLET | Refills: 0 | Status: SHIPPED | OUTPATIENT
Start: 2020-07-12 | End: 2021-03-06 | Stop reason: HOSPADM

## 2020-07-12 RX ORDER — DIAZEPAM 5 MG/1
5 TABLET ORAL EVERY 8 HOURS PRN
Qty: 10 TABLET | Refills: 0 | Status: SHIPPED | OUTPATIENT
Start: 2020-07-12 | End: 2020-07-12 | Stop reason: SDUPTHER

## 2020-07-12 RX ORDER — PREDNISONE 20 MG/1
40 TABLET ORAL ONCE
Status: COMPLETED | OUTPATIENT
Start: 2020-07-12 | End: 2020-07-12

## 2020-07-12 RX ORDER — PREDNISONE 20 MG/1
40 TABLET ORAL DAILY
Qty: 8 TABLET | Refills: 0 | Status: SHIPPED | OUTPATIENT
Start: 2020-07-13 | End: 2020-07-12 | Stop reason: SDUPTHER

## 2020-07-12 RX ADMIN — PANTOPRAZOLE SODIUM 20 MG: 20 TABLET, DELAYED RELEASE ORAL at 18:59

## 2020-07-12 RX ADMIN — PREDNISONE 40 MG: 20 TABLET ORAL at 18:59

## 2020-07-12 NOTE — ED PROVIDER NOTES
History  Chief Complaint   Patient presents with    Vaginal Bleeding    Back Pain     HPI  Patient is a 27-year-old female presenting for evaluation of multiple complaints including back pain, radicular pain down the left leg, vaginal bleeding  Patient states that 3 weeks ago she tripped over a box at home and fell backwards onto her back on a carpeted floor  Patient states that she had sharp lumbosacral pain, since then has had spasm on the left side in the paraspinous muscles, occasional electric radiation down the back of the left leg  Patient denies associated weakness, numbness, urinary or bowel incontinence, urinary retention, saddle anesthesias  Patient denies prior injury to the area  Patient states she has been ambulating without issue  Patient additionally stating that starting a few days after the initial fall she developed left-sided chest wall and left upper quadrant pain, worse with twisting, intermittent, minor  Patient states that this morning she developed vaginal spotting, scant clear discharge  Patient denies lower abdominal pain, fevers, chills, dysuria, increased urinary frequency  Patient states that her last menstrual period was about 3 and half weeks ago, states that at baseline she is irregular secondary to PCOS, states that about a month ago she stopped taking oral contraceptive pills but continues to be sexually active without additional contraception  Patient stating that she is sexually active with a single partner  Prior to Admission Medications   Prescriptions Last Dose Informant Patient Reported? Taking?    ALPRAZolam (XANAX) 0 5 mg tablet   No No   Sig: Take 1 tablet (0 5 mg total) by mouth 3 (three) times a day as needed for anxiety or sleep for up to 10 days   norethindrone-ethinyl estradiol (MICROGESTIN) 1-20 MG-MCG per tablet   Yes No   Sig: Take 1 tablet by mouth daily   spironolactone (ALDACTONE) 100 mg tablet   Yes No   Sig: Take 100 mg by mouth daily Facility-Administered Medications: None       Past Medical History:   Diagnosis Date    Hypertension     PCOS (polycystic ovarian syndrome)        History reviewed  No pertinent surgical history  History reviewed  No pertinent family history  I have reviewed and agree with the history as documented  E-Cigarette/Vaping     E-Cigarette/Vaping Substances     Social History     Tobacco Use    Smoking status: Current Some Day Smoker    Smokeless tobacco: Never Used   Substance Use Topics    Alcohol use: No    Drug use: No        Review of Systems   Constitutional: Negative for chills, fatigue and fever  HENT: Negative for hearing loss  Eyes: Negative for visual disturbance  Respiratory: Negative for cough, chest tightness and shortness of breath  Cardiovascular: Negative for chest pain  Gastrointestinal: Positive for abdominal pain (LUQ)  Negative for abdominal distention, constipation, diarrhea, nausea and vomiting  Endocrine: Negative for polydipsia and polyuria  Genitourinary: Positive for menstrual problem and vaginal bleeding  Negative for decreased urine volume, difficulty urinating, dyspareunia, dysuria, flank pain, frequency, genital sores, hematuria, pelvic pain, urgency, vaginal discharge and vaginal pain  Musculoskeletal: Positive for back pain  Negative for arthralgias, gait problem, joint swelling, myalgias, neck pain and neck stiffness  Skin: Negative for color change and rash  Neurological: Negative for dizziness and headaches  Psychiatric/Behavioral: Negative for confusion         Physical Exam  ED Triage Vitals   Temperature Pulse Respirations Blood Pressure SpO2   07/12/20 1525 07/12/20 1525 07/12/20 1525 07/12/20 1525 07/12/20 1525   98 °F (36 7 °C) 104 18 168/100 100 %      Temp Source Heart Rate Source Patient Position - Orthostatic VS BP Location FiO2 (%)   07/12/20 1525 07/12/20 1525 07/12/20 1730 07/12/20 1730 --   Oral Monitor Lying Right arm       Pain Score --                    Orthostatic Vital Signs  Vitals:    07/12/20 1525 07/12/20 1730 07/12/20 1910   BP: 168/100 161/76 130/83   Pulse: 104 98 85   Patient Position - Orthostatic VS:  Lying Sitting       Physical Exam   Constitutional: She is oriented to person, place, and time  She appears well-developed and well-nourished  No distress  Well-appearing, nondistressed   HENT:   Head: Normocephalic and atraumatic  Right Ear: External ear normal    Left Ear: External ear normal    Nose: Nose normal    Mouth/Throat: Oropharynx is clear and moist  No oropharyngeal exudate  Eyes: Pupils are equal, round, and reactive to light  Neck: Normal range of motion  Cardiovascular: Normal rate, regular rhythm and normal heart sounds  Exam reveals no gallop and no friction rub  No murmur heard  Pulmonary/Chest: Effort normal and breath sounds normal  No respiratory distress  She has no wheezes  She exhibits no tenderness  Abdominal: Soft  Bowel sounds are normal  She exhibits no distension and no mass  There is no tenderness  There is no guarding  Very minor left upper quadrant tenderness  No lower abdominal or adnexal tenderness  Musculoskeletal: Normal range of motion  She exhibits no edema or deformity  No C or T spine tenderness  Moderate lumbosacral tenderness without step-offs, deformities, overlying skin changes  Significant tenderness along the piriformis muscle  Negative straight leg raise test   Lymphadenopathy:     She has no cervical adenopathy  Neurological: She is alert and oriented to person, place, and time  Skin: Skin is warm and dry  Capillary refill takes less than 2 seconds  She is not diaphoretic  Psychiatric: She has a normal mood and affect  Vitals reviewed        ED Medications  Medications   predniSONE tablet 40 mg (40 mg Oral Given 7/12/20 1859)   pantoprazole (PROTONIX) EC tablet 20 mg (20 mg Oral Given 7/12/20 1859)       Diagnostic Studies  Results Reviewed Procedure Component Value Units Date/Time    Chlamydia/GC amplified DNA by PCR [[id number]]  (Normal) Collected:  07/12/20 1728    Lab Status:  Final result Specimen:  Urine, Other Updated:  07/14/20 0323     N gonorrhoeae, DNA Probe Negative     Chlamydia trachomatis, DNA Probe Negative    Narrative:       Test performed using PCR amplification of target DNA  This test is intended as an aid in the diagnosis of Chlamydial and gonococcal disease  This test has not been evaluated in patients younger than 15years of age and is not recommended for evaluation of suspected sexual abuse  Additional testing is recommended when the results do not correlate with clinical signs and symptoms  POCT pregnancy, urine [[id number]]  (Normal) Resulted:  07/12/20 1740    Lab Status:  Final result Updated:  07/12/20 1740     EXT PREG TEST UR (Ref: Negative) Negative (-)     Control Valid    POCT urinalysis dipstick [[id number]]  (Normal) Resulted:  07/12/20 1739    Lab Status:  Final result Updated:  07/12/20 1740     Color, UA Yellow     Clarity, UA Clear                 XR spine lumbar 2 or 3 views injury   Final Result by Eladia Mckenzie MD (07/13 4144)      Normal examination  Workstation performed: [id number]               Procedures  Procedures      ED Course                                           MDM  Number of Diagnoses or Management Options  Sciatica:   Diagnosis management comments: 57-year-old female presenting for multiple complaints including back pain following a fall several weeks ago, vaginal bleeding starting today  Patient well-appearing with normal vital signs, benign abdominal examination, moderate midline lumbosacral tenderness  Patient otherwise atraumatic, able to ambulate around the room without issue  Full strength and sensation bilaterally in lower extremities, no symptoms of cauda equina syndrome  Lumbar plain films evaluated and grossly normal without evidence of fracture or subluxation  Patient treated symptomatically with prednisone, Toradol, urine pregnancy test negative, additionally checked GC/chlamydia, will inform patient of result, discharged with return precautions and [facility] referral, OBGYN follow-up if persistent dysfunctional uterine bleeding       Amount and/or Complexity of Data Reviewed  Clinical lab tests: ordered and reviewed  Tests in the radiology section of CPT®: ordered and reviewed  Tests in the medicine section of CPT®: reviewed and ordered  Decide to obtain previous medical records or to obtain history from someone other than the patient: yes  Review and summarize past medical records: yes  Independent visualization of images, tracings, or specimens: yes    Risk of Complications, Morbidity, and/or Mortality  Presenting problems: low  Diagnostic procedures: low  Management options: low    Patient Progress  Patient progress: improved        Disposition  Final diagnoses:   Sciatica     Time reflects when diagnosis was documented in both MDM as applicable and the Disposition within this note     Time User Action Codes Description Comment    7/12/2020  6:10 PM April Shore Add [M54 30] Sciatica       ED Disposition     ED Disposition Condition Date/Time Comment    Discharge Stable Sun Jul 12, 2020  7:17 PM Tessa Villasenor discharge to home/self care              Follow-up Information     Follow up With Specialties Details Why Contact Info Additional [address] [facility]   [address] [id number]  [address] [place] [address], [address] [place] [address] Physical Therapy   [phone]          Discharge Medication List as of 7/12/2020  7:24 PM      CONTINUE these medications which have CHANGED    Details   ciclopirox (LOPROX) 0 77 % cream Apply topically 2 (two) times a day, Starting Sun 7/12/2020, Print      diazepam (VALIUM) 5 mg tablet Take 1 tablet (5 mg total) by mouth every 8 (eight) hours as needed for muscle spasms for up to 10 doses, Starting Sun 7/12/2020, Print      predniSONE 20 mg tablet Take 2 tablets (40 mg total) by mouth daily for 4 days Take with food, Starting Mon 7/13/2020, Until Fri 7/17/2020, Print         CONTINUE these medications which have NOT CHANGED    Details   ALPRAZolam (XANAX) 0 5 mg tablet Take 1 tablet (0 5 mg total) by mouth 3 (three) times a day as needed for anxiety or sleep for up to 10 days, Starting Mon 8/12/2019, Until Thu 8/22/2019, Normal      norethindrone-ethinyl estradiol (MICROGESTIN) 1-20 MG-MCG per tablet Take 1 tablet by mouth daily, Until Discontinued, Historical Med      spironolactone (ALDACTONE) 100 mg tablet Take 100 mg by mouth daily, Until Discontinued, Historical Med           No discharge procedures on file  PDMP Review     None           ED Provider  Attending physically available and evaluated Maribelshirley Truongbrian DELGADO managed the patient along with the ED Attending      Electronically Signed by         Amanda Cochran MD  07/14/20 3934

## 2020-07-12 NOTE — DISCHARGE INSTRUCTIONS
Continue with prescribed medications for relief of back spasm and pain  We have provided information to call and schedule an appointment to follow-up with [facility]   They may recommend additional imaging at that time  Our x-rays today were unremarkable  Additionally provided information to follow-up with [facility] women[facility] clinic if you continue to have irregular vaginal bleeding  Return to the emergency department if you have new weakness or numbness in your legs, urinary or bowel incontinence, urinary retention, prolonged vaginal bleeding, worsening abdominal pain, or if you pass out

## 2020-07-14 LAB
C TRACH DNA SPEC QL NAA+PROBE: NEGATIVE
N GONORRHOEA DNA SPEC QL NAA+PROBE: NEGATIVE

## 2020-07-20 NOTE — ED ATTENDING ATTESTATION
7/12/2020  IBinta MD, saw and evaluated the patient  I have discussed the patient with the resident/non-physician practitioner and agree with the resident's/non-physician practitioner's findings, Plan of Care, and MDM as documented in the resident's/non-physician practitioner's note, except where noted  All available labs and Radiology studies were reviewed  I was present for key portions of any procedure(s) performed by the resident/non-physician practitioner and I was immediately available to provide assistance  At this point I agree with the current assessment done in the Emergency Department  I have conducted an independent evaluation of this patient a history and physical is as follows:    ED Course     Patient presents for evaluation due to 3 weeks of low back pain with radiation down her left leg  Patient states that the pain is constantly in left side of her back and occasionally as electrical sensation going down the back of her left leg  Patient denies any numbness, weakness, loss of bowel/bladder control, or saddle anesthesia  Patient denies any direct trauma, fevers, or IV drug use  No additional complaints  Exam: AAOx3, NAD, RRR, CTA, S/NT/ND, no motor/sensory deficits, positive straight leg raise on the left, tenderness to palpation in left piriformis  A/P:  Sciatica    Will attempt to treat with steroids and Valium    Critical Care Time  Procedures

## 2020-09-09 ENCOUNTER — OFFICE VISIT (OUTPATIENT)
Dept: BARIATRICS | Facility: CLINIC | Age: 25
End: 2020-09-09

## 2020-09-09 VITALS
HEIGHT: 65 IN | HEART RATE: 93 BPM | WEIGHT: 275.3 LBS | DIASTOLIC BLOOD PRESSURE: 70 MMHG | BODY MASS INDEX: 45.87 KG/M2 | TEMPERATURE: 97.6 F | SYSTOLIC BLOOD PRESSURE: 122 MMHG | RESPIRATION RATE: 17 BRPM

## 2020-09-09 DIAGNOSIS — I10 ESSENTIAL HYPERTENSION: ICD-10-CM

## 2020-09-09 DIAGNOSIS — E66.01 OBESITY, CLASS III, BMI 40-49.9 (MORBID OBESITY) (HCC): Primary | ICD-10-CM

## 2020-09-09 DIAGNOSIS — Z01.818 PREOP EXAMINATION: ICD-10-CM

## 2020-09-09 DIAGNOSIS — E66.9 DIABETES MELLITUS TYPE 2 IN OBESE (HCC): ICD-10-CM

## 2020-09-09 DIAGNOSIS — E11.69 DIABETES MELLITUS TYPE 2 IN OBESE (HCC): ICD-10-CM

## 2020-09-09 PROCEDURE — RECHECK

## 2020-09-09 NOTE — PROGRESS NOTES
Bariatric Nutrition Assessment Note    Type of surgery    Preop  Surgery Date: TBD  Surgeon: Dr Renteria Earing  30 y o   female     Wt with BMI of 25: 150 lbs  Pre-Op Excess Wt: 125 lbs  Blood pressure 122/70, pulse 93, temperature 97 6 °F (36 4 °C), temperature source Temporal, resp  rate 17, height 5' 5" (1 651 m), weight 125 kg (275 lb 4 8 oz), not currently breastfeeding    [address] Parveen Equation-2600 calories/day to maintain wt                                        1900 cals/day to lose 1 lb/week                                        1600 cals/day to lose 2 lb/week  Weight History   Onset of Obesity: Childhood  Family history of obesity: Yes  Wt Loss Attempts: Commercial Programs (Zeolife/Homesnap, mobile[employer], etc )  Counseling with  MD  Self Created Diets (Portion Control, Healthy Food Choices, etc )  Maximum Wt Lost: 40 lbs    Review of History and Medications   Past Medical History:   Diagnosis Date    Hypertension     PCOS (polycystic ovarian syndrome)      Past Surgical History:   Procedure Laterality Date    NO PAST SURGERIES       Social History     Socioeconomic History    Marital status: /Civil Union     Spouse name: None    Number of children: None    Years of education: None    Highest education level: None   Occupational History    None   Social Needs    Financial resource strain: None    Food insecurity     Worry: None     Inability: None    Transportation needs     Medical: None     Non-medical: None   Tobacco Use    Smoking status: Former Smoker     Types: Cigarettes     Last attempt to quit: 2017     Years since quitting: 3 6    Smokeless tobacco: Never Used    Tobacco comment: patient admits to hx of 1 or 2 cigarettes a year   Substance and Sexual Activity    Alcohol use: No    Drug use: No    Sexual activity: None   Lifestyle    Physical activity     Days per week: None     Minutes per session: None    Stress: None Relationships    Social connections     Talks on phone: None     Gets together: None     Attends Jew service: None     Active member of club or organization: None     Attends meetings of clubs or organizations: None     Relationship status: None    Intimate partner violence     Fear of current or ex partner: None     Emotionally abused: None     Physically abused: None     Forced sexual activity: None   Other Topics Concern    None   Social History Narrative    None       Current Outpatient Medications:     Multiple Vitamins-Minerals (MULTI FOR HER PO), Take by mouth, Disp: , Rfl:     norethindrone-ethinyl estradiol (MICROGESTIN) 1-20 MG-MCG per tablet, Take 1 tablet by mouth daily, Disp: , Rfl:     spironolactone (ALDACTONE) 100 mg tablet, Take 100 mg by mouth daily, Disp: , Rfl:     ALPRAZolam (XANAX) 0 5 mg tablet, Take 1 tablet (0 5 mg total) by mouth 3 (three) times a day as needed for anxiety or sleep for up to 10 days, Disp: 20 tablet, Rfl: 0    ciclopirox (LOPROX) 0 77 % cream, Apply topically 2 (two) times a day (Patient not taking: Reported on 9/9/2020), Disp: 30 g, Rfl: 0    diazepam (VALIUM) 5 mg tablet, Take 1 tablet (5 mg total) by mouth every 8 (eight) hours as needed for muscle spasms for up to 10 doses (Patient not taking: Reported on 9/9/2020), Disp: 10 tablet, Rfl: 0  Food Intake and Lifestyle Assessment   Food Intake Assessment completed via usual diet recall  Breakfast: Special K and milk or yogurt and banana or skips  Snack: 0   Lunch: skips or chicken sandwich or frozen dinner (Shelia Mantilla)  Snack: 0  Dinner: Kaur Chroman, brown rice, chicken breast, vegetables, guacamole on the side or Chiquita sandwich or [employer] or frozen pizza  Snack: 0  Beverage intake: water, sugar free beverages and coffee  Protein supplement: 0  Estimated protein intake per day: > 60 gm  Estimated fluid intake per day: 64 oz  Meals eaten away from home: 3-4 times/week  Typical meal pattern: 1-3 meals per day and 0 snacks per day  Eating Behaviors: Consumption of high calorie/ high fat foods and Large portion sizes  Food allergies or intolerances: No Known Allergies  Cultural or Taoism considerations: Estonian-cooks it but doesn't eat it    Physical Assessment  Physical Activity  Types of exercise: None  Current physical limitations: none    Psychosocial Assessment   Support systems: spouse friend(s)  Socioeconomic factors: N/A    Nutrition Diagnosis  Diagnosis: Overweight / Obesity (NC-3 3)  Related to: Physical inactivity and Excessive energy intake  As Evidenced by: BMI >25     Nutrition Prescription: Recommend the following diet  Low fat, Low sugar and High protein    Interventions and Teaching   Discussed pre-op and post-op nutrition guidelines  Patient educated and handouts provided    Surgical changes to stomach / GI  Capacity of post-surgery stomach  Diet progression  Adequate hydration  Sugar and fat restriction to decrease "dumping syndrome"  Fat restriction to decrease steatorrhea  Expected weight loss  Weight loss plateaus/ possibility of weight regain  Exercise  Suggestions for pre-op diet  Nutrition considerations after surgery  Protein supplements  Meal planning and preparation  Appropriate carbohydrate, protein, and fat intake, and food/fluid choices to maximize safe weight loss, nutrient intake, and tolerance   Dietary and lifestyle changes  Possible problems with poor eating habits  Intuitive eating  Techniques for self monitoring and keeping daily food journal  Potential for food intolerance after surgery, and ways to deal with them including: lactose intolerance, nausea, reflux, vomiting, diarrhea, food intolerance, appetite changes, gas  Vitamin / Mineral supplementation of Multivitamin with minerals and Vitamin D    Education provided to: patient    Barriers to learning: No barriers identified  Readiness to change: preparation    Prior research on procedure: friends or family    Comprehension: verbalizes understanding     Expected Compliance: good  Recommendations  Pt is an appropriate candidate for surgery  Yes    Evaluation / Monitoring  Dietitian to Monitor:  Tolerance of nutrition prescription Body weight Lab values Physical activity Bowel pattern    Goals  Food journal, Exercise 30 minutes 5 times per week, Complete lession plans 1-6, Eat 3 meals per day and Eliminate mindless snacking    Time Spent:   1 Hour

## 2020-09-09 NOTE — PROGRESS NOTES
Bariatric Behavioral Health Evaluation    Presenting Problem- Pt has PCOS, high blood pressure and possibly high cholesterol and wants to address these issues  This is the heaviest she has been and feels sluggish and is experiencing body pain that she thinks is due to her weight  She wants to be able to be active  Is the patient seeking Bariatric Surgery Eval? Yes   She has looked into another program and has been researching for about 4-5 months  Realizes Post- Op Requirements? somewhat    Pre-morbid level of function and history of present illness:Pt reports struggling with her since childhood  Psychiatric/Psychological Treatment Diagnosis: Not currently  In 2019 dxed with mild anxiety  presc Xanax  States she took for "2 months occasionally"    Outpatient Counselor No     Psychiatrist No      Have you had Inpatient Treatment? No    Family Constellation - Lives with   Mother is 48 and has thyroid issues  Dad is living at 46 and has high cholesterol high blood pressure and diabetes  She has 3 siblings  No reported health issues  She reports obesity in family  She denies D & A issues as well as MH issues for family members  Domestic Violence No    Are they currently in the situation? No    Abuse History:  Pt denies this    Additional comments/stressors related to family/relationships/peer support- Pt reports her  and best friend to be her support system  Recently she has experienced several deaths- family and friends  Health issues are also stressful for her      Physical/Psychological Assessment:     Appearance: appropriate  Sociability: average  Affect: appropriate  Mood: calm  Thought Process: coherent  Speech: normal  Content: no impairment  Orientation:   Insight: intellectual  good    Risk Assessment:     None    Recommendations: Recommended for surgery  yes    Risk of Harm to Self or Others: Pt denies any thoughts of harming self or others currently or historically      Access to weapons yes     Weapons secured by - in locked safe with ammunition locked separately    Based on the previous information, the client presents the following risk of harm to self or others: low    BARIATRIC SURGERY EDUCATION CHECKLIST     I have received education related to my bariatric surgery process and understand:     Patients may be required to complete a psychiatric evaluation and receive clearance for surgery from their psychiatrist      Patients who undergo weight loss surgery are at higher risk of increased mental health concerns and suicide attempts      Patients may be required to complete a full substance abuse evaluation and then complete all treatment recommendations prior to surgery      If diagnosis of abuse/dependence results, patient may be required to remain sober for one (1) year before having bariatric surgery      Patients on psychiatric medications should check with their provider to discuss psychiatric medications and the changes in absorption    Patient should discuss all time release medications with provider and take all medications as prescribed      The recommendation is that there is no use of  any tobacco products, Hookah or  vapes for the bariatric post-operation patient      Bariatric surgery patients should not consume alcohol as a post-operative patient as it may increase risk of numerous health conditions including but not limited to alcohol abuse and ulcers      There is a possibility of weight regain if patient does not follow all program guidelines and recommendations      Bariatric surgery patients should exercise thirty (30) to sixty (60) minutes per day to maintain post-surgical weight loss      Research indicates that bariatric patients are more successful when they see a therapist for up to two (2) years post-op      Patients will follow all medical and dietary recommendations provided      Patient will keep all scheduled appointments and follow up with their physician for a minimum of five (5) years      Patient will take all vitamins as recommended  Post-operative vitamins are life-long      Patient reviewed Bariatric Surgery Education Checklist and agrees they have received education on these issues  Completed Behavioral Health Assessment  Provided patient education as needed  Patient denies current symptoms  of  Axis 1 diagnosis of depression/anxiety and is not currently taking medication prescribed by PCP or psychiatrist  Chart review shows Xanax however pt states she hasn't taken for several years   Patient  meets criteria for [address] [place] [address] bariatric  surgery program and is therefore referred to surgeon

## 2020-09-25 ENCOUNTER — OFFICE VISIT (OUTPATIENT)
Dept: SLEEP CENTER | Facility: CLINIC | Age: 25
End: 2020-09-25
Payer: COMMERCIAL

## 2020-09-25 VITALS
BODY MASS INDEX: 46.75 KG/M2 | HEIGHT: 65 IN | SYSTOLIC BLOOD PRESSURE: 130 MMHG | DIASTOLIC BLOOD PRESSURE: 78 MMHG | OXYGEN SATURATION: 99 % | WEIGHT: 280.6 LBS | HEART RATE: 106 BPM

## 2020-09-25 DIAGNOSIS — E66.01 OBESITY, CLASS III, BMI 40-49.9 (MORBID OBESITY) (HCC): ICD-10-CM

## 2020-09-25 DIAGNOSIS — E11.69 DIABETES MELLITUS TYPE 2 IN OBESE (HCC): ICD-10-CM

## 2020-09-25 DIAGNOSIS — E28.2 PCOS (POLYCYSTIC OVARIAN SYNDROME): ICD-10-CM

## 2020-09-25 DIAGNOSIS — R40.0 DAYTIME SLEEPINESS: ICD-10-CM

## 2020-09-25 DIAGNOSIS — I10 ESSENTIAL HYPERTENSION: ICD-10-CM

## 2020-09-25 DIAGNOSIS — G47.09 OTHER INSOMNIA: ICD-10-CM

## 2020-09-25 DIAGNOSIS — Z01.818 PREOP EXAMINATION: ICD-10-CM

## 2020-09-25 DIAGNOSIS — E66.9 DIABETES MELLITUS TYPE 2 IN OBESE (HCC): ICD-10-CM

## 2020-09-25 DIAGNOSIS — R51.9 HEADACHE UPON AWAKENING: ICD-10-CM

## 2020-09-25 DIAGNOSIS — G47.33 OSA (OBSTRUCTIVE SLEEP APNEA): Primary | ICD-10-CM

## 2020-09-25 PROCEDURE — 99244 OFF/OP CNSLTJ NEW/EST MOD 40: CPT | Performed by: INTERNAL MEDICINE

## 2020-09-25 RX ORDER — ZOLPIDEM TARTRATE 5 MG/1
5 TABLET ORAL AS NEEDED
Qty: 2 TABLET | Refills: 0 | Status: SHIPPED | OUTPATIENT
Start: 2020-09-25 | End: 2021-03-06 | Stop reason: HOSPADM

## 2020-09-25 NOTE — PROGRESS NOTES
Review of Systems      Genitourinary need to urinate more than twice a night and sleep problems that vary with menstrual cycle    Cardiology Frequent chest pain or angina, , palpitations/fluttering feeling in the chest and ankle/leg swelling   Gastrointestinal frequent heartburn/acid reflux   Neurology awaken with headache   Constitutional fatigue, excessive sweating at night and weight change   Integumentary none   Psychiatry none   Musculoskeletal joint pain, muscle aches, back pain, sciatica and leg cramps   Pulmonary shortness of breath with activity, chest tightness, wheezing and snoring   ENT none   Endocrine frequent urination   Hematological none

## 2020-09-25 NOTE — PATIENT INSTRUCTIONS
What is ASCENCION? Obstructive sleep apnea is a common and serious sleep disorder that causes you to stop breathing during sleep  The airway repeatedly becomes blocked, limiting the amount of air that reaches your lungs  When this happens, you may snore loudly or making choking noises as you try to breathe  Your brain and body becomes oxygen deprived and you may wake up  This may happen a few times a night, or in more severe cases, several hundred times a night  Sleep apnea can make you wake up in the morning feeling tired or unrefreshed even though you have had a full night of sleep  During the day, you may feel fatigued, have difficulty concentrating or you may even unintentionally fall asleep  This is because your body is waking up numerous times throughout the night, even though you might not be conscious of each awakening  The lack of oxygen your body receives can have negative long-term consequences for your health  This includes:  High blood pressure  Heart disease  Irregular heart rhythms  Stroke  Pre-diabetes and diabetes  Depression    Testing  An objective evaluation of your sleep may be needed before your board certified sleep physician can make a diagnosis  Options include:   In-lab overnight sleep study  This type of sleep study requires you to stay overnight at a sleep center, in a bed that may resemble a hotel room  You will sleep with sensors hooked up to various parts of your body  These sensors record your brain waves, heartbeat, breathing and movement  An overnight sleep study also provides your doctor with the most complete information about your sleep  Learn more about an overnight sleep study      Home sleep apnea test  Some patients with high risk factors for obstructive sleep apnea and no other medical disorders may be candidates for a home sleep apnea test  The testing equipment differs in that it is less complicated than what is used in an overnight sleep study   As such, does not give all the data an in-lab will and if negative, may not mean you do not have the problem  Treatment for sleep apnea  includes using a continuous positive airway pressure (CPAP) machine to keep your airway open during sleep  A mask is placed over your nose and mouth, or just your nose  The mask is hooked to the CPAP machine that blows a gentle stream of air into the mask when you breathe  This helps keep your airway open so you can breathe more regularly  Extra oxygen may be given to you through the machine  You may be given a mouth device  It looks like a mouth guard or dental retainer and stops your tongue and mouth tissues from blocking your throat while you sleep  Surgery may be needed to remove extra tissues that block your mouth, throat, or nose  Manage sleep apnea:   Do not smoke  Nicotine and other chemicals in cigarettes and cigars can cause lung damage  Ask your healthcare provider for information if you currently smoke and need help to quit  E-cigarettes or smokeless tobacco still contain nicotine  Talk to your healthcare provider before you use these products  Do not drink alcohol or take sedative medicine before you go to sleep  Alcohol and sedatives can relax the muscles and tissues around your throat  This can block the airflow to your lungs  Maintain a healthy weight  Excess tissue around your throat may restrict your breathing  Ask your healthcare provider for information if you need to lose weight  Sleep on your side or use pillows designed to prevent sleep apnea  This prevents your tongue or other tissues from blocking your throat  You can also raise the head of your bed  Driving Safety  Refrain from driving when drowsy  Follow up with your healthcare provider as directed:  Write down your questions so you remember to ask them during your visits  Go to [facility] website for more information: Sleepeducation  org     What is ASCENCION?    Obstructive sleep apnea is a common and serious sleep disorder that causes you to stop breathing during sleep  The airway repeatedly becomes blocked, limiting the amount of air that reaches your lungs  When this happens, you may snore loudly or making choking noises as you try to breathe  Your brain and body becomes oxygen deprived and you may wake up  This may happen a few times a night, or in more severe cases, several hundred times a night  Sleep apnea can make you wake up in the morning feeling tired or unrefreshed even though you have had a full night of sleep  During the day, you may feel fatigued, have difficulty concentrating or you may even unintentionally fall asleep  This is because your body is waking up numerous times throughout the night, even though you might not be conscious of each awakening  The lack of oxygen your body receives can have negative long-term consequences for your health  This includes:  High blood pressure  Heart disease  Irregular heart rhythms  Stroke  Pre-diabetes and diabetes  Depression    Testing  An objective evaluation of your sleep may be needed before your board certified sleep physician can make a diagnosis  Options include:   In-lab overnight sleep study  This type of sleep study requires you to stay overnight at a sleep center, in a bed that may resemble a hotel room  You will sleep with sensors hooked up to various parts of your body  These sensors record your brain waves, heartbeat, breathing and movement  An overnight sleep study also provides your doctor with the most complete information about your sleep  Learn more about an overnight sleep study      Home sleep apnea test  Some patients with high risk factors for obstructive sleep apnea and no other medical disorders may be candidates for a home sleep apnea test  The testing equipment differs in that it is less complicated than what is used in an overnight sleep study   As such, does not give all the data an in-lab will and if negative, may not mean you do not have the problem  Treatment for sleep apnea  includes using a continuous positive airway pressure (CPAP) machine to keep your airway open during sleep  A mask is placed over your nose and mouth, or just your nose  The mask is hooked to the CPAP machine that blows a gentle stream of air into the mask when you breathe  This helps keep your airway open so you can breathe more regularly  Extra oxygen may be given to you through the machine  You may be given a mouth device  It looks like a mouth guard or dental retainer and stops your tongue and mouth tissues from blocking your throat while you sleep  Surgery may be needed to remove extra tissues that block your mouth, throat, or nose  Manage sleep apnea:   Do not smoke  Nicotine and other chemicals in cigarettes and cigars can cause lung damage  Ask your healthcare provider for information if you currently smoke and need help to quit  E-cigarettes or smokeless tobacco still contain nicotine  Talk to your healthcare provider before you use these products  Do not drink alcohol or take sedative medicine before you go to sleep  Alcohol and sedatives can relax the muscles and tissues around your throat  This can block the airflow to your lungs  Maintain a healthy weight  Excess tissue around your throat may restrict your breathing  Ask your healthcare provider for information if you need to lose weight  Sleep on your side or use pillows designed to prevent sleep apnea  This prevents your tongue or other tissues from blocking your throat  You can also raise the head of your bed  Driving Safety  Refrain from driving when drowsy  Follow up with your healthcare provider as directed:  Write down your questions so you remember to ask them during your visits  Go to AASM website for more information: Sleepeducation  org     What you can do to improve your sleep: (Sleep Hygiene) Basic rules for a good night's sleep    Create a regular sleep schedule    This will help you form a sleep routine  Keep a record of your sleep patterns, and any sleeping problems you have  Bring the record to follow-up visits with healthcare providers  Avoid prolonged use of light-emitting screens before bedtime or watching TV in bed  Avoid forcing sleep  Do not take naps  Naps could make it hard for you to fall asleep at bedtime  Deal with your worries before bedtime  Keep your bedroom cool, quiet, and dark  Turn on white noise, such as a fan, to help you relax  Do not use your bed for any activity that will keep you awake  Do not read, exercise, eat, or watch TV in your bedroom  Get up if you do not fall asleep within 20 minutes  Move to another room and do something relaxing until you become sleepy  Limit caffeine, alcohol, nicotine and food to earlier in the day  Only drink caffeine in the morning  Do not drink alcohol within 6 hours of bedtime  Do not eat a heavy meal right before you go to bed  Avoid smoking, especially in the evening  Exercise regularly  Daily exercise will help you sleep better  Do not exercise within 4 hours of bedtime  Stimulus control therapy rules  1  Go to bed only when sleepy  2  Do not watch television, read, eat, or worry while in bed  Use bed only for sleep and sex  3  Get out of bed if unable to fall asleep within 20 minutes and go to another room  Return to bed only when sleepy  Repeat this step as many times as necessary throughout the night  4  Set an alarm clock to wake up at a fixed time each morning, including weekends  5  Do not take a nap during the day  Data from: [address] [place] [address], [address] Nonpharmacologic treatments of insomnia  J Clin Psychiatry 3425; 53:37  Go to AASM website for more information: Sleepeducation  org     Recommended Reading:  Book by authors [address]   No More sleepless nights            Nursing Support:  When: Monday through Friday 7A-5PM except holidays  Where: Our direct line is [phone]  If you are having a true emergency please call 911  In the event that the line is busy or it is after hours please leave a voice message and we will return your call  Please speak clearly, leaving your full name, birth date, best number to reach you and the reason for your call  Medication refills: We will need the name of the medication, the dosage, the ordering provider, whether you get a 30 or 90 day refill, and the pharmacy name and address  Medications will be ordered by the provider only  Nurses cannot call in prescriptions  Please allow 7 days for medication refills  Physician requested updates: If your provider requested that you call with an update after starting medication, please be ready to provide us the medication and dosage, what time you take your medication, the time you attempt to fall asleep, time you fall asleep, when you wake up, and what time you get out of bed  Sleep Study Results: We will contact you with sleep study results and/or next steps after the physician has reviewed your testing

## 2020-09-25 NOTE — PROGRESS NOTES
Consultation - Sleep Center   Pam Yancey  30 y o  female  :1995  ZVM:[id number]    Physician Requesting Consult: Ariel Lomax MD     Reason for Consult : At your kind request I saw this patient for initial sleep evaluation today  The patient is planning Bariatric surgery and is here to also evaluate for Obstructive Sleep Apnea  PFSH, Problem List, Medications & Allergies were reviewed in EMR  She  has a past medical history of Hypertension and PCOS (polycystic ovarian syndrome)  She has a current medication list which includes the following prescription(s): multiple vitamins-minerals, norethindrone-ethinyl estradiol, spironolactone, alprazolam, ciclopirox, diazepam, and zolpidem  HPI:  She presents with complaint of constant fatigue of several years duration and is tired irrespective of sleep  She also awakens herself with gasping  Family reports snoring and have witnessed apneas  She is not aware of modifying factors  Other Complaints: none  Restless Leg Syndrome: reports no suggestive symptoms    Parasomnia: no features reported    Sleep Routine:   Typical Bedtime:  11:00 p m  Gets OOB:  7:00 a m  TIB:8 hrs  Sleep latency: upto 60 minutes because of racing thoughts and work-related stress Sleep Interruptions:3-5/nite and struggles to fall back asleep  Awakens: with aid of an alarm  Upon awakening: never feels rested  She awakens with headache 3 to 4 times a week    She estimates getting 3 hrs sleep  She has Excessive Daytime Sleepiness and naps for approximately an hour during the day  [place] Sleepiness Scale rated at Total score: 15 /24  Habits:  reports that she quit smoking about 3 years ago  Her smoking use included cigarettes  She has never used smokeless tobacco  ;   E-Cigarette/Vaping    E-Cigarette Use Never User     ;  reports no history of drug use ;  reports no history of alcohol use  ; Caffeine use:moderate until around 5:00 p m ; Exercise routine: none   Family History: Negative for sleep disturbance  ROS - constitutional, psychiatric, ENT, respiratory,CVS, GI, UGS, CNS, MSK, integumentary, endocrine, hematological reviewed- see attached  Significant for approximately 75 lb weight gain in the past year  She reports chest tightness and at times awakens with chest pain  She has acid reflux  She reports shortness of breath, chest tightness and wheezing  Shelvy Mingle EXAM:  /78   Pulse (!) 106   Ht 5' 4 5" (1 638 m)   Wt 127 kg (280 lb 9 6 oz)   SpO2 99%   BMI 47 42 kg/m²    General: Well groomed female, well appearing, in no apparent distress  Psychiatric: Alert and orientated; Cooperative; Speech:clear and coherent; appears anxious  Otherwise  Normal mood, affect & thought   HEENT:  Craniofacial anatomy: normal Sinuses: non- tender  TMJ: Normal    Eyes: EOM's intact;  conjunctiva/corneas clear   Ears: Externallyappear normal     Nasal Airway: partially obstructed Septum:intact; Mucosa: normal; Turbinates: normal; Rhinorrhea: None   Mouth: Lips: normal posture; Dentition: normal   Mucosa:moist  ; Hard Palate:narrow and high arched   Oropharryx: crowded and AP narrowing Tongue: Mallampati:Class IV and MobileSoft Palate:  redundant  Tonsils: no hypertrophy  Neck:is thick and excess fatty tissue; Neck Circumference: 16 5 "; Supple; no abnormal masses; Thyroid:normal  Trachea:central     Lymph: No Cervical or Submandibular Lymhadenopathy  Heart: S1,S2 normal; RRR; no gallop; nomurmurs   Lungs: Respiratory Effort:normal  Air entry good bilaterally  No wheezes  No rales  Abdomen: Obese, Soft & non-tender    Extremities: No pedal edema  No clubbing or cyanosis  Skin: warm and dry; Color& Hydration good; no facial rashes or lesions   Neurological: CNII-XII intact;  Motor normal; Sensation normal   Musculoskeletal: Muscle bulk, tone and power WNL Gait:normal        IMPRESSION: Primary, Secondary Sleep Diagnoses (to Medical or Psych conditions) & Comorbidities 1  ASCENCION (obstructive sleep apnea)  Diagnostic Sleep Study    CPAP Study   2  Other insomnia  zolpidem (AMBIEN) 5 mg tablet   3  Headache upon awakening     4  Daytime sleepiness     5  Obesity, Class III, BMI 40-49 9 (morbid obesity) ([address] )  Ambulatory referral to Sleep Medicine   6  Essential hypertension  Ambulatory referral to Sleep Medicine   7  Diabetes mellitus type 2 in obese [facility])  Ambulatory referral to Sleep Medicine   8  PCOS (polycystic ovarian syndrome)     9  Preop examination  Ambulatory referral to Sleep Medicine        PLAN:   1  Comprehensive counseling was provided on pathophysiology, diagnostic strategies & treatment options; effects on symptoms and comorbidities; risks of inadequate therapy; costs and insurance aspects  2  I advised on weight reduction, avoiding sleeping supine, using alcohol or sedating medications close to bed time and on safe driving practices  3  Cognitive behavioral therapy was initiated with advise on Sleep Hygiene and behavioral techniques to manage Insomnia  Specifically, avoiding caffeine use after 3:00 p m , starting an exercise routine, limiting time in bed to 7 hours or less and on relaxation techniques  4  Nocturnal polysomnography is indicated and a diagnostic study will be scheduled  Home sleep testing is contraindicated because Severe insomnia and High risk for sleep-related hypoventilation  5  Patient is willing to try Positive airway pressure therapy and will be scheduled for a titration study if indicated  6  Follow-up will be scheduled after the studies to review results, further details of treatment options and to initiate/adjust therapy  Thank you for allowing me to participate in the care of this patient  I will keep you apprised of developments            Sincerely,     Authenticated electronically by Meche Shelton MD   on 89/54/68   Board Certified Specialist

## 2020-10-01 ENCOUNTER — HOSPITAL ENCOUNTER (OUTPATIENT)
Dept: SLEEP CENTER | Facility: CLINIC | Age: 25
Discharge: HOME | End: 2020-10-01
Payer: COMMERCIAL

## 2020-10-01 DIAGNOSIS — G47.33 OSA (OBSTRUCTIVE SLEEP APNEA): ICD-10-CM

## 2020-10-01 PROCEDURE — 95810 POLYSOM 6/> YRS 4/> PARAM: CPT

## 2020-10-07 ENCOUNTER — TELEPHONE (OUTPATIENT)
Dept: SLEEP CENTER | Facility: CLINIC | Age: 25
End: 2020-10-07

## 2020-10-08 ENCOUNTER — OFFICE VISIT (OUTPATIENT)
Dept: BARIATRICS | Facility: CLINIC | Age: 25
End: 2020-10-08

## 2020-10-08 VITALS
HEART RATE: 114 BPM | WEIGHT: 285.1 LBS | BODY MASS INDEX: 47.5 KG/M2 | TEMPERATURE: 97.6 F | HEIGHT: 65 IN | SYSTOLIC BLOOD PRESSURE: 132 MMHG | RESPIRATION RATE: 16 BRPM | DIASTOLIC BLOOD PRESSURE: 82 MMHG

## 2020-10-08 DIAGNOSIS — E28.2 PCOS (POLYCYSTIC OVARIAN SYNDROME): ICD-10-CM

## 2020-10-08 DIAGNOSIS — I10 HTN (HYPERTENSION): ICD-10-CM

## 2020-10-08 DIAGNOSIS — E66.01 OBESITY, CLASS III, BMI 40-49.9 (MORBID OBESITY) (HCC): Primary | ICD-10-CM

## 2020-10-08 PROCEDURE — 99214 OFFICE O/P EST MOD 30 MIN: CPT | Performed by: PHYSICIAN ASSISTANT

## 2020-10-08 RX ORDER — NORETHINDRONE ACETATE AND ETHINYL ESTRADIOL AND FERROUS FUMARATE 1MG-20(21)
KIT ORAL
COMMUNITY
Start: 2020-09-30 | End: 2021-02-17

## 2020-10-15 ENCOUNTER — TELEPHONE (OUTPATIENT)
Dept: BARIATRICS | Facility: CLINIC | Age: 25
End: 2020-10-15

## 2020-10-16 ENCOUNTER — OFFICE VISIT (OUTPATIENT)
Dept: SLEEP CENTER | Facility: CLINIC | Age: 25
End: 2020-10-16
Payer: COMMERCIAL

## 2020-10-16 VITALS
OXYGEN SATURATION: 99 % | TEMPERATURE: 97.1 F | HEART RATE: 101 BPM | HEIGHT: 65 IN | DIASTOLIC BLOOD PRESSURE: 88 MMHG | SYSTOLIC BLOOD PRESSURE: 130 MMHG | WEIGHT: 286.8 LBS | BODY MASS INDEX: 47.78 KG/M2

## 2020-10-16 DIAGNOSIS — R40.0 DAYTIME SLEEPINESS: ICD-10-CM

## 2020-10-16 DIAGNOSIS — E66.9 DIABETES MELLITUS TYPE 2 IN OBESE (HCC): ICD-10-CM

## 2020-10-16 DIAGNOSIS — F41.9 ANXIETY: ICD-10-CM

## 2020-10-16 DIAGNOSIS — E66.01 OBESITY, CLASS III, BMI 40-49.9 (MORBID OBESITY) (HCC): ICD-10-CM

## 2020-10-16 DIAGNOSIS — G47.09 OTHER INSOMNIA: ICD-10-CM

## 2020-10-16 DIAGNOSIS — R51.9 HEADACHE UPON AWAKENING: ICD-10-CM

## 2020-10-16 DIAGNOSIS — E28.2 PCOS (POLYCYSTIC OVARIAN SYNDROME): ICD-10-CM

## 2020-10-16 DIAGNOSIS — R06.83 HABITUAL SNORING: Primary | ICD-10-CM

## 2020-10-16 DIAGNOSIS — I10 ESSENTIAL HYPERTENSION: ICD-10-CM

## 2020-10-16 DIAGNOSIS — E11.69 DIABETES MELLITUS TYPE 2 IN OBESE (HCC): ICD-10-CM

## 2020-10-16 PROCEDURE — 99214 OFFICE O/P EST MOD 30 MIN: CPT | Performed by: INTERNAL MEDICINE

## 2020-11-05 ENCOUNTER — OFFICE VISIT (OUTPATIENT)
Dept: BARIATRICS | Facility: CLINIC | Age: 25
End: 2020-11-05

## 2020-11-05 ENCOUNTER — OFFICE VISIT (OUTPATIENT)
Dept: BARIATRICS | Facility: CLINIC | Age: 25
End: 2020-11-05
Payer: COMMERCIAL

## 2020-11-05 VITALS
TEMPERATURE: 98.2 F | SYSTOLIC BLOOD PRESSURE: 128 MMHG | WEIGHT: 287 LBS | HEIGHT: 65 IN | HEART RATE: 100 BPM | BODY MASS INDEX: 47.82 KG/M2 | RESPIRATION RATE: 16 BRPM | DIASTOLIC BLOOD PRESSURE: 78 MMHG

## 2020-11-05 DIAGNOSIS — E66.9 OBESITY, CLASS I, BMI 30-34.9: Primary | ICD-10-CM

## 2020-11-05 DIAGNOSIS — E66.01 OBESITY, CLASS III, BMI 40-49.9 (MORBID OBESITY) (HCC): Primary | ICD-10-CM

## 2020-11-05 PROCEDURE — RECHECK

## 2020-11-05 PROCEDURE — 99214 OFFICE O/P EST MOD 30 MIN: CPT | Performed by: PHYSICIAN ASSISTANT

## 2020-11-30 ENCOUNTER — LAB (OUTPATIENT)
Dept: LAB | Facility: CLINIC | Age: 25
End: 2020-11-30
Payer: COMMERCIAL

## 2020-11-30 DIAGNOSIS — E66.9 OBESITY, CLASS I, BMI 30-34.9: ICD-10-CM

## 2020-11-30 LAB
ALBUMIN SERPL BCP-MCNC: 3.2 G/DL (ref 3.5–5)
ALP SERPL-CCNC: 78 U/L (ref 46–116)
ALT SERPL W P-5'-P-CCNC: 35 U/L (ref 12–78)
ANION GAP SERPL CALCULATED.3IONS-SCNC: 6 MMOL/L (ref 4–13)
AST SERPL W P-5'-P-CCNC: 20 U/L (ref 5–45)
BASOPHILS # BLD AUTO: 0.05 THOUSANDS/ΜL (ref 0–0.1)
BASOPHILS NFR BLD AUTO: 1 % (ref 0–1)
BILIRUB SERPL-MCNC: 0.36 MG/DL (ref 0.2–1)
BUN SERPL-MCNC: 18 MG/DL (ref 5–25)
CALCIUM ALBUM COR SERPL-MCNC: 9.7 MG/DL (ref 8.3–10.1)
CALCIUM SERPL-MCNC: 9.1 MG/DL (ref 8.3–10.1)
CHLORIDE SERPL-SCNC: 106 MMOL/L (ref 100–108)
CHOLEST SERPL-MCNC: 208 MG/DL (ref 50–200)
CO2 SERPL-SCNC: 25 MMOL/L (ref 21–32)
CREAT SERPL-MCNC: 0.75 MG/DL (ref 0.6–1.3)
EOSINOPHIL # BLD AUTO: 0.52 THOUSAND/ΜL (ref 0–0.61)
EOSINOPHIL NFR BLD AUTO: 6 % (ref 0–6)
ERYTHROCYTE [DISTWIDTH] IN BLOOD BY AUTOMATED COUNT: 12.9 % (ref 11.6–15.1)
GFR SERPL CREATININE-BSD FRML MDRD: 110 ML/MIN/1.73SQ M
GLUCOSE P FAST SERPL-MCNC: 76 MG/DL (ref 65–99)
HCT VFR BLD AUTO: 42.5 % (ref 34.8–46.1)
HDLC SERPL-MCNC: 36 MG/DL
HGB BLD-MCNC: 13.4 G/DL (ref 11.5–15.4)
IMM GRANULOCYTES # BLD AUTO: 0.05 THOUSAND/UL (ref 0–0.2)
IMM GRANULOCYTES NFR BLD AUTO: 1 % (ref 0–2)
LDLC SERPL CALC-MCNC: 142 MG/DL (ref 0–100)
LYMPHOCYTES # BLD AUTO: 2.66 THOUSANDS/ΜL (ref 0.6–4.47)
LYMPHOCYTES NFR BLD AUTO: 31 % (ref 14–44)
MCH RBC QN AUTO: 26.9 PG (ref 26.8–34.3)
MCHC RBC AUTO-ENTMCNC: 31.5 G/DL (ref 31.4–37.4)
MCV RBC AUTO: 85 FL (ref 82–98)
MONOCYTES # BLD AUTO: 0.51 THOUSAND/ΜL (ref 0.17–1.22)
MONOCYTES NFR BLD AUTO: 6 % (ref 4–12)
NEUTROPHILS # BLD AUTO: 4.71 THOUSANDS/ΜL (ref 1.85–7.62)
NEUTS SEG NFR BLD AUTO: 55 % (ref 43–75)
NONHDLC SERPL-MCNC: 172 MG/DL
NRBC BLD AUTO-RTO: 0 /100 WBCS
PLATELET # BLD AUTO: 310 THOUSANDS/UL (ref 149–390)
PMV BLD AUTO: 10.7 FL (ref 8.9–12.7)
POTASSIUM SERPL-SCNC: 4.1 MMOL/L (ref 3.5–5.3)
PROT SERPL-MCNC: 7.7 G/DL (ref 6.4–8.2)
RBC # BLD AUTO: 4.99 MILLION/UL (ref 3.81–5.12)
SODIUM SERPL-SCNC: 137 MMOL/L (ref 136–145)
TRIGL SERPL-MCNC: 150 MG/DL
TSH SERPL DL<=0.05 MIU/L-ACNC: 1.82 UIU/ML (ref 0.36–3.74)
WBC # BLD AUTO: 8.5 THOUSAND/UL (ref 4.31–10.16)

## 2020-11-30 PROCEDURE — 84443 ASSAY THYROID STIM HORMONE: CPT

## 2020-11-30 PROCEDURE — 80053 COMPREHEN METABOLIC PANEL: CPT

## 2020-11-30 PROCEDURE — 80061 LIPID PANEL: CPT

## 2020-11-30 PROCEDURE — 85025 COMPLETE CBC W/AUTO DIFF WBC: CPT

## 2020-11-30 PROCEDURE — 36415 COLL VENOUS BLD VENIPUNCTURE: CPT

## 2020-12-03 ENCOUNTER — OFFICE VISIT (OUTPATIENT)
Dept: BARIATRICS | Facility: CLINIC | Age: 25
End: 2020-12-03
Payer: COMMERCIAL

## 2020-12-03 VITALS
TEMPERATURE: 98.4 F | RESPIRATION RATE: 16 BRPM | HEIGHT: 65 IN | SYSTOLIC BLOOD PRESSURE: 128 MMHG | DIASTOLIC BLOOD PRESSURE: 92 MMHG | HEART RATE: 112 BPM | BODY MASS INDEX: 48.65 KG/M2 | WEIGHT: 292 LBS

## 2020-12-03 DIAGNOSIS — E28.2 PCOS (POLYCYSTIC OVARIAN SYNDROME): ICD-10-CM

## 2020-12-03 DIAGNOSIS — E66.01 OBESITY, CLASS III, BMI 40-49.9 (MORBID OBESITY) (HCC): Primary | ICD-10-CM

## 2020-12-03 DIAGNOSIS — R10.9 ABDOMINAL PAIN: ICD-10-CM

## 2020-12-03 PROCEDURE — 99214 OFFICE O/P EST MOD 30 MIN: CPT | Performed by: PHYSICIAN ASSISTANT

## 2020-12-07 ENCOUNTER — OFFICE VISIT (OUTPATIENT)
Dept: GYNECOLOGY | Facility: CLINIC | Age: 25
End: 2020-12-07
Payer: COMMERCIAL

## 2020-12-07 ENCOUNTER — HOSPITAL ENCOUNTER (OUTPATIENT)
Dept: NON INVASIVE DIAGNOSTICS | Facility: CLINIC | Age: 25
Discharge: HOME | End: 2020-12-07
Payer: COMMERCIAL

## 2020-12-07 VITALS
WEIGHT: 292 LBS | HEIGHT: 65 IN | SYSTOLIC BLOOD PRESSURE: 120 MMHG | BODY MASS INDEX: 48.65 KG/M2 | DIASTOLIC BLOOD PRESSURE: 70 MMHG

## 2020-12-07 DIAGNOSIS — R09.89 HOMANS SIGN PRESENT: ICD-10-CM

## 2020-12-07 DIAGNOSIS — I10 HYPERTENSION, UNSPECIFIED TYPE: ICD-10-CM

## 2020-12-07 DIAGNOSIS — M79.662 BILATERAL CALF PAIN: ICD-10-CM

## 2020-12-07 DIAGNOSIS — Z01.419 ENCOUNTER FOR ANNUAL ROUTINE GYNECOLOGICAL EXAMINATION: Primary | ICD-10-CM

## 2020-12-07 DIAGNOSIS — M79.661 BILATERAL CALF PAIN: ICD-10-CM

## 2020-12-07 DIAGNOSIS — E28.2 PCOS (POLYCYSTIC OVARIAN SYNDROME): ICD-10-CM

## 2020-12-07 PROCEDURE — 93970 EXTREMITY STUDY: CPT | Performed by: SURGERY

## 2020-12-07 PROCEDURE — S0610 ANNUAL GYNECOLOGICAL EXAMINA: HCPCS | Performed by: OBSTETRICS & GYNECOLOGY

## 2020-12-07 PROCEDURE — 93970 EXTREMITY STUDY: CPT

## 2020-12-16 ENCOUNTER — OFFICE VISIT (OUTPATIENT)
Dept: BARIATRICS | Facility: CLINIC | Age: 25
End: 2020-12-16

## 2020-12-16 VITALS — HEIGHT: 65 IN | WEIGHT: 292 LBS | BODY MASS INDEX: 48.65 KG/M2

## 2020-12-16 DIAGNOSIS — E66.01 OBESITY, CLASS III, BMI 40-49.9 (MORBID OBESITY) (HCC): Primary | ICD-10-CM

## 2020-12-16 PROCEDURE — RECHECK

## 2020-12-24 ENCOUNTER — OFFICE VISIT (OUTPATIENT)
Dept: BARIATRICS | Facility: CLINIC | Age: 25
End: 2020-12-24

## 2020-12-24 VITALS — HEIGHT: 65 IN | WEIGHT: 292 LBS | BODY MASS INDEX: 48.65 KG/M2

## 2020-12-24 DIAGNOSIS — E66.01 OBESITY, CLASS III, BMI 40-49.9 (MORBID OBESITY) (HCC): Primary | ICD-10-CM

## 2020-12-24 PROCEDURE — 99214 OFFICE O/P EST MOD 30 MIN: CPT | Performed by: PHYSICIAN ASSISTANT

## 2021-01-27 ENCOUNTER — OFFICE VISIT (OUTPATIENT)
Dept: BARIATRICS | Facility: CLINIC | Age: 26
End: 2021-01-27
Payer: COMMERCIAL

## 2021-01-27 VITALS
TEMPERATURE: 97.3 F | SYSTOLIC BLOOD PRESSURE: 136 MMHG | WEIGHT: 284 LBS | HEIGHT: 65 IN | HEART RATE: 119 BPM | BODY MASS INDEX: 47.32 KG/M2 | DIASTOLIC BLOOD PRESSURE: 88 MMHG | RESPIRATION RATE: 18 BRPM

## 2021-01-27 DIAGNOSIS — E66.01 OBESITY, CLASS III, BMI 40-49.9 (MORBID OBESITY) (HCC): Primary | ICD-10-CM

## 2021-01-27 DIAGNOSIS — E66.01 MORBID OBESITY (HCC): Primary | ICD-10-CM

## 2021-01-27 DIAGNOSIS — I10 ESSENTIAL HYPERTENSION: ICD-10-CM

## 2021-01-27 PROCEDURE — 99204 OFFICE O/P NEW MOD 45 MIN: CPT | Performed by: SURGERY

## 2021-01-27 NOTE — PROGRESS NOTES
BARIATRIC CONSULT-INITIAL - BARIATRIC SURGERY  Radha July Hend 30 y o  female MRN: [id number]  Unit/Bed#:  Encounter: [id number]      HPI:  Crys Walker is a 30 y o  female who presents with morbid obesity to discuss weight loss options  Review of Systems    Historical Information   Past Medical History:   Diagnosis Date    Hypertension     Obesity     PCOS (polycystic ovarian syndrome)      Past Surgical History:   Procedure Laterality Date    NO PAST SURGERIES       Social History   Social History     Substance and Sexual Activity   Alcohol Use No     Social History     Substance and Sexual Activity   Drug Use No     Social History     Tobacco Use   Smoking Status Former Smoker    Types: Cigarettes    Quit date:     Years since quittin 0   Smokeless Tobacco Never Used   Tobacco Comment    patient admits to hx of 1 or 2 cigarettes a year     Family History: non-contributory    Meds/Allergies   all medications and allergies reviewed  No Known Allergies    Objective       Current Vitals:   Blood Pressure: 136/88 (21 1541)  Pulse: (!) 119 (21 1541)  Temperature: (!) 97 3 °F (36 3 °C) (21 1541)  Respirations: 18 (21 1541)  Height: 5' 5" (165 1 cm) (21 1541)  Weight - Scale: 129 kg (284 lb) (21 154)  Body mass index is 47 26 kg/m²  Invasive Devices     None                 Physical Exam    Lab Results: I have personally reviewed pertinent lab results  Imaging: I have personally reviewed pertinent reports  EKG, Pathology, and Other Studies: I have personally reviewed pertinent reports  Code Status: [unfilled]  Advance Directive and Living Will:      Power of :    POLST:      Assessment/PLAN:            Patient has a long history of morbid obesity and is presenting to discuss the surgical weight loss options  Despite the patient best efforts patient was unable to lose any meaningful or sustainable weight using nonsurgical means  We had a long discussion regarding all the surgical weight-loss options at our disposal at this point and reviewed the risks and benefits of each procedure in details as it relates to her age, BMI and medical conditions  Patient elected to undergo sleeve    Risks and benefits were explained to the patient  We also discussed the importance and need of a preoperative workup to make sure that the patient can undergo the procedure safely  Preoperative workup includes sleep apnea screening, cardiac evaluation, nutrition/psych and preoperative EGD  Risks and benefits of all the preoperative diagnostic tests were discussed with the patient including but not limited to the upper endoscopy  Alternatives to surgery and alternative forms of surgery were also explained  Postsurgical commitment and aftercare programs were discussed and explained to the patient in details   In terms of comorbidities patient suffers mostly of   Past Medical History:   Diagnosis Date    Hypertension     Obesity     PCOS (polycystic ovarian syndrome)        I informed the patient that the rate of resolution of comorbid conditions following weight loss surgery is between 60 and 90% depending on the severity of the specific medical condition  I discussed and educated the patient regarding the different components of our multidisciplinary program and the importance of compliance and follow-up in the postoperative period  All questions answered  Patient understands risks and benefits  A videotape of the procedure was also shown to the patient  After showing the video we discussed all the technical aspects of the procedure and also the potential complications including but not limited to gastrointestinal perforation, leak, obstruction, stricture and hemorrhage  I spent 45 min with the patient more than 50% of the time was spent educating the patient and coordinating care

## 2021-01-27 NOTE — H&P (VIEW-ONLY)
BARIATRIC CONSULT-INITIAL - BARIATRIC SURGERY  Tania Posada 30 y o  female MRN: [id number]  Unit/Bed#:  Encounter: [id number]      HPI:  Michelle Read is a 30 y o  female who presents with morbid obesity to discuss weight loss options  Review of Systems    Historical Information   Past Medical History:   Diagnosis Date    Hypertension     Obesity     PCOS (polycystic ovarian syndrome)      Past Surgical History:   Procedure Laterality Date    NO PAST SURGERIES       Social History   Social History     Substance and Sexual Activity   Alcohol Use No     Social History     Substance and Sexual Activity   Drug Use No     Social History     Tobacco Use   Smoking Status Former Smoker    Types: Cigarettes    Quit date:     Years since quittin 0   Smokeless Tobacco Never Used   Tobacco Comment    patient admits to hx of 1 or 2 cigarettes a year     Family History: non-contributory    Meds/Allergies   all medications and allergies reviewed  No Known Allergies    Objective       Current Vitals:   Blood Pressure: 136/88 (21 1541)  Pulse: (!) 119 (21 1541)  Temperature: (!) 97 3 °F (36 3 °C) (21 1541)  Respirations: 18 (21 154)  Height: 5' 5" (165 1 cm) (21 1541)  Weight - Scale: 129 kg (284 lb) (21 154)  Body mass index is 47 26 kg/m²  Invasive Devices     None                 Physical Exam    Lab Results: I have personally reviewed pertinent lab results  Imaging: I have personally reviewed pertinent reports  EKG, Pathology, and Other Studies: I have personally reviewed pertinent reports  Code Status: [unfilled]  Advance Directive and Living Will:      Power of :    POLST:      Assessment/PLAN:            Patient has a long history of morbid obesity and is presenting to discuss the surgical weight loss options  Despite the patient best efforts patient was unable to lose any meaningful or sustainable weight using nonsurgical means  We had a long discussion regarding all the surgical weight-loss options at our disposal at this point and reviewed the risks and benefits of each procedure in details as it relates to her age, BMI and medical conditions  Patient elected to undergo sleeve    Risks and benefits were explained to the patient  We also discussed the importance and need of a preoperative workup to make sure that the patient can undergo the procedure safely  Preoperative workup includes sleep apnea screening, cardiac evaluation, nutrition/psych and preoperative EGD  Risks and benefits of all the preoperative diagnostic tests were discussed with the patient including but not limited to the upper endoscopy  Alternatives to surgery and alternative forms of surgery were also explained  Postsurgical commitment and aftercare programs were discussed and explained to the patient in details   In terms of comorbidities patient suffers mostly of   Past Medical History:   Diagnosis Date    Hypertension     Obesity     PCOS (polycystic ovarian syndrome)        I informed the patient that the rate of resolution of comorbid conditions following weight loss surgery is between 60 and 90% depending on the severity of the specific medical condition  I discussed and educated the patient regarding the different components of our multidisciplinary program and the importance of compliance and follow-up in the postoperative period  All questions answered  Patient understands risks and benefits  A videotape of the procedure was also shown to the patient  After showing the video we discussed all the technical aspects of the procedure and also the potential complications including but not limited to gastrointestinal perforation, leak, obstruction, stricture and hemorrhage  I spent 45 min with the patient more than 50% of the time was spent educating the patient and coordinating care

## 2021-02-16 ENCOUNTER — ANESTHESIA EVENT (OUTPATIENT)
Dept: GASTROENTEROLOGY | Facility: HOSPITAL | Age: 26
End: 2021-02-16

## 2021-02-17 ENCOUNTER — HOSPITAL ENCOUNTER (OUTPATIENT)
Dept: GASTROENTEROLOGY | Facility: HOSPITAL | Age: 26
Setting detail: OUTPATIENT SURGERY
Discharge: HOME | End: 2021-02-17
Attending: SURGERY | Admitting: SURGERY
Payer: COMMERCIAL

## 2021-02-17 ENCOUNTER — ANESTHESIA (OUTPATIENT)
Dept: GASTROENTEROLOGY | Facility: HOSPITAL | Age: 26
End: 2021-02-17

## 2021-02-17 ENCOUNTER — TELEPHONE (OUTPATIENT)
Dept: OTHER | Facility: OTHER | Age: 26
End: 2021-02-17

## 2021-02-17 VITALS
RESPIRATION RATE: 20 BRPM | SYSTOLIC BLOOD PRESSURE: 135 MMHG | TEMPERATURE: 97.2 F | DIASTOLIC BLOOD PRESSURE: 85 MMHG | BODY MASS INDEX: 48 KG/M2 | HEIGHT: 65 IN | HEART RATE: 83 BPM | OXYGEN SATURATION: 100 %

## 2021-02-17 VITALS — HEART RATE: 92 BPM

## 2021-02-17 DIAGNOSIS — E66.01 OBESITY, CLASS III, BMI 40-49.9 (MORBID OBESITY) (HCC): ICD-10-CM

## 2021-02-17 LAB
EXT PREGNANCY TEST URINE: NEGATIVE
EXT. CONTROL: NORMAL

## 2021-02-17 PROCEDURE — 88305 TISSUE EXAM BY PATHOLOGIST: CPT | Performed by: PATHOLOGY

## 2021-02-17 PROCEDURE — 43239 EGD BIOPSY SINGLE/MULTIPLE: CPT | Performed by: SURGERY

## 2021-02-17 PROCEDURE — 81025 URINE PREGNANCY TEST: CPT | Performed by: ANESTHESIOLOGY

## 2021-02-17 RX ORDER — PROPOFOL 10 MG/ML
INJECTION, EMULSION INTRAVENOUS AS NEEDED
Status: DISCONTINUED | OUTPATIENT
Start: 2021-02-17 | End: 2021-02-17

## 2021-02-17 RX ORDER — SODIUM CHLORIDE 9 MG/ML
125 INJECTION, SOLUTION INTRAVENOUS CONTINUOUS
Status: DISCONTINUED | OUTPATIENT
Start: 2021-02-17 | End: 2021-02-21 | Stop reason: HOSPADM

## 2021-02-17 RX ADMIN — PROPOFOL 50 MG: 10 INJECTION, EMULSION INTRAVENOUS at 08:44

## 2021-02-17 RX ADMIN — PROPOFOL 50 MG: 10 INJECTION, EMULSION INTRAVENOUS at 08:49

## 2021-02-17 RX ADMIN — SODIUM CHLORIDE 125 ML/HR: 0.9 INJECTION, SOLUTION INTRAVENOUS at 07:56

## 2021-02-17 RX ADMIN — PROPOFOL 50 MG: 10 INJECTION, EMULSION INTRAVENOUS at 08:47

## 2021-02-17 RX ADMIN — PROPOFOL 50 MG: 10 INJECTION, EMULSION INTRAVENOUS at 08:43

## 2021-02-17 RX ADMIN — PROPOFOL 50 MG: 10 INJECTION, EMULSION INTRAVENOUS at 08:45

## 2021-02-17 NOTE — INTERVAL H&P NOTE
H&P reviewed  After examining the patient I find no changes in the patients condition since the H&P had been written      Vitals:    02/17/21 0751   BP: 132/83   Pulse: 84   Resp: 20   Temp: (!) 97 2 °F (36 2 °C)   SpO2: 100%

## 2021-02-17 NOTE — DISCHARGE INSTRUCTIONS
Upper Endoscopy   WHAT YOU NEED TO KNOW:   An upper endoscopy is also called an upper gastrointestinal (GI) endoscopy, or an esophagogastroduodenoscopy (EGD)  You may feel bloated, gassy, or have some abdominal discomfort after your procedure  Your throat may be sore for 24 to 36 hours  You may burp or pass gas from air that is still inside your body  DISCHARGE INSTRUCTIONS:   Call 911 if:   · You have sudden chest pain or trouble breathing  Seek care immediately if:   · You feel dizzy or faint  · You have trouble swallowing  · You have severe throat pain  · Your bowel movements are very dark or black  · Your abdomen is hard and firm and you have severe pain  · You vomit blood  Contact your healthcare provider if:   · You feel full or bloated and cannot burp or pass gas  · You have not had a bowel movement for 3 days after your procedure  · You have neck pain  · You have a fever or chills  · You have nausea or are vomiting  · You have a rash or hives  · You have questions or concerns about your endoscopy  Relieve a sore throat:  Suck on throat lozenges or crushed ice  Gargle with a small amount of warm salt water  Mix 1 teaspoon of salt and 1 cup of warm water to make salt water  Relieve gas and discomfort from bloating:  Lie on your right side with a heating pad on your abdomen  Take short walks to help pass gas  Eat small meals until bloating is relieved  Rest after your procedure:  Do not drive or make important decisions until the day after your procedure  Return to your normal activity as directed  You can usually return to work the day after your procedure  Follow up with your healthcare provider as directed:  Write down your questions so you remember to ask them during your visits  © Copyright [address] Information is for End User's use only and may not be sold, redistributed or otherwise used for commercial purposes   All illustrations and images included in CareNotes® are the copyrighted property of A D A M , Inc  or Erica Mares   The above information is an  only  It is not intended as medical advice for individual conditions or treatments  Talk to your doctor, nurse or pharmacist before following any medical regimen to see if it is safe and effective for you

## 2021-02-17 NOTE — ANESTHESIA PREPROCEDURE EVALUATION
Procedure:  EGD    Relevant Problems   CARDIO   (+) HTN (hypertension)      PULMONARY   (+) ASCENCION (obstructive sleep apnea)      Other   (+) Morbid obesity with BMI of 45 0-49 9, adult [facility])        Physical Exam    Airway    Mallampati score: II  TM Distance: >3 FB  Neck ROM: full     Dental   No notable dental hx     Cardiovascular  Rhythm: regular, Rate: normal, Cardiovascular exam normal    Pulmonary  Pulmonary exam normal Breath sounds clear to auscultation,     Other Findings        Anesthesia Plan  ASA Score- 3     Anesthesia Type- general and IV sedation with anesthesia with ASA Monitors  Additional Monitors:   Airway Plan:           Plan Factors-    Chart reviewed  Patient summary reviewed  Patient is not a current smoker  Patient instructed to abstain from smoking on day of procedure  Patient did not smoke on day of surgery  Induction- intravenous  Postoperative Plan-     Informed Consent- Anesthetic plan and risks discussed with patient  I personally reviewed this patient with the CRNA  Discussed and agreed on the Anesthesia Plan with the CRNA  Mir Gordillo

## 2021-02-17 NOTE — TELEPHONE ENCOUNTER
Pt is having an EGD procedure today and is worried about her at for tomorrow  Please call pt for her concerns  If is requesting if a Virtual visit can be completed or possible rescheduled  If pt does not answer due to procedure  Please call her sister Richelle Bachelor  Her number is listed in the call details

## 2021-02-18 ENCOUNTER — OFFICE VISIT (OUTPATIENT)
Dept: BARIATRICS | Facility: CLINIC | Age: 26
End: 2021-02-18

## 2021-02-18 VITALS — HEIGHT: 65 IN | WEIGHT: 282 LBS | BODY MASS INDEX: 46.98 KG/M2

## 2021-02-18 DIAGNOSIS — E66.01 OBESITY, CLASS III, BMI 40-49.9 (MORBID OBESITY) (HCC): Primary | ICD-10-CM

## 2021-02-18 PROCEDURE — RECHECK

## 2021-02-18 NOTE — PROGRESS NOTES
Bariatric Follow Up Nutrition Note    Preop 3/4 weight checks  Preop Program    Type of surgery  Preop  Surgery Date: TBD    Surgeon: Dr Alee Lee  30 y o   female  Height 5' 4 5" (1 638 m), weight 128 kg (282 lb), not currently breastfeeding  Body mass index is 47 66 kg/m²          Review of History and Medications   Past Medical History:   Diagnosis Date    Hypertension     Obesity     PCOS (polycystic ovarian syndrome)      Past Surgical History:   Procedure Laterality Date    NO PAST SURGERIES       Social History     Socioeconomic History    Marital status: /Civil Union     Spouse name: Not on file    Number of children: Not on file    Years of education: Not on file    Highest education level: Not on file   Occupational History    Not on file   Social Needs    Financial resource strain: Not on file    Food insecurity     Worry: Not on file     Inability: Not on file   [employer] needs     Medical: Not on file     Non-medical: Not on file   Tobacco Use    Smoking status: Former Smoker     Types: Cigarettes     Quit date:      Years since quittin 1    Smokeless tobacco: Never Used    Tobacco comment: patient admits to hx of 1 or 2 cigarettes a year   Substance and Sexual Activity    Alcohol use: No    Drug use: No    Sexual activity: Yes     Partners: Male     Birth control/protection: OCP   Lifestyle    Physical activity     Days per week: Not on file     Minutes per session: Not on file    Stress: Not on file   Relationships    Social connections     Talks on phone: Not on file     Gets together: Not on file     Attends [employer] service: Not on file     Active member of club or organization: Not on file     Attends meetings of clubs or organizations: Not on file     Relationship status: Not on file    Intimate partner violence     Fear of current or ex partner: Not on file     Emotionally abused: Not on file Physically abused: Not on file     Forced sexual activity: Not on file   Other Topics Concern    Not on file   Social History Narrative    Not on file       Current Outpatient Medications:     ALPRAZolam (XANAX) 0 5 mg tablet, Take 1 tablet (0 5 mg total) by mouth 3 (three) times a day as needed for anxiety or sleep for up to 10 days (Patient not taking: Reported on 9/25/2020), Disp: 20 tablet, Rfl: 0    ciclopirox (LOPROX) 0 77 % cream, Apply topically 2 (two) times a day (Patient not taking: Reported on 9/9/2020), Disp: 30 g, Rfl: 0    diazepam (VALIUM) 5 mg tablet, Take 1 tablet (5 mg total) by mouth every 8 (eight) hours as needed for muscle spasms for up to 10 doses (Patient not taking: Reported on 9/9/2020), Disp: 10 tablet, Rfl: 0    zolpidem (AMBIEN) 5 mg tablet, Take 1 tablet (5 mg total) by mouth as needed for sleep (for use during sleep study) for up to 2 days (Patient not taking: Reported on 10/16/2020), Disp: 2 tablet, Rfl: 0  No current facility-administered medications for this visit       Facility-Administered Medications Ordered in Other Visits:     sodium chloride 0 9 % infusion, 125 mL/hr, Intravenous, Continuous, Josh Cid DO, Stopped at 02/17/21 0068    Food Intake and Lifestyle Assessment   Food Intake Assessment completed via usual diet recall  Breakfast: [employer]  Snack: 0   Lunch: grapes with cheese or yogurt parfait  Snack: 0  Dinner: cooks for  doesn't eat it-frozen healthy dinner 23-29 gm protein  Snack: 0  Beverage intake: water, sweetened beverages and coffee/tea  Diet texture/stage: regular  Protein supplement: 0  Estimated protein intake per day: 60 gm  Estimated fluid intake per day: 64 oz water and 8 oz coffee  Meals eaten away from home: almost daily  Typical meal pattern: 1 meals per day and 0 snacks per day  Eating Behaviors: increasing protein, water, decreased caffeine    Food allergies or intolerances: none  Cultural or Gnosticist considerations: [place]    Physical Assessment  Nutrition Related Findings  Not making changes yet    Physical Activity  Types of exercise: None  Current physical limitations: none    Psychosocial Assessment   Support systems: spouse  Socioeconomic factors: works 2 jobs-real estate, [employer]  Full time in admissions, [employer]    Nutrition Diagnosis  Diagnosis: Overweight / Obesity (NC-3 3)  Related to: Physical inactivity and Excessive energy intake  As Evidenced by: BMI >25     Interventions and Teaching   Patient educated on post-op nutrition guidelines  Patient educated and handouts provided    Surgical changes to stomach / GI  Capacity of post-surgery stomach  Diet progression  Adequate hydration  Sugar and fat restriction to decrease "dumping syndrome"  Fat restriction to decrease steatorrhea  Expected weight loss  Weight loss plateaus/ possibility of weight regain  Exercise  Suggestions for pre-op diet  Nutrition considerations after surgery  Protein supplements  Meal planning and preparation  Appropriate carbohydrate, protein, and fat intake, and food/fluid choices to maximize safe weight loss, nutrient intake, and tolerance   Dietary and lifestyle changes  Possible problems with poor eating habits  Intuitive eating  Techniques for self monitoring and keeping daily food journal  Potential for food intolerance after surgery, and ways to deal with them including: lactose intolerance, nausea, reflux, vomiting, diarrhea, food intolerance, appetite changes, gas  Vitamin / Mineral supplementation of Multivitamin with minerals and Vitamin D    Education provided to: patient    Barriers to learning: No barriers identified    Readiness to change: preparation    Comprehension: verbalizes understanding     Expected Compliance: good    Goals  Eliminate sugar sweetened beverages, Food journal, Exercise 30 minutes 5 times per week, Complete lession plans 1-6 and Eat 3 meals per day     Time Spent:   30 Minutes

## 2021-02-19 ENCOUNTER — TELEPHONE (OUTPATIENT)
Dept: BARIATRICS | Facility: CLINIC | Age: 26
End: 2021-02-19

## 2021-02-19 NOTE — TELEPHONE ENCOUNTER
Patient called into the office requesting a F/U with you in reference to EGD review  Your next available appt is on 03/24/2021  Patient states can we schedule a virtual visit for her EGD Review

## 2021-02-23 ENCOUNTER — CONSULT (OUTPATIENT)
Dept: CARDIOLOGY CLINIC | Facility: CLINIC | Age: 26
End: 2021-02-23
Payer: COMMERCIAL

## 2021-02-23 VITALS
DIASTOLIC BLOOD PRESSURE: 80 MMHG | BODY MASS INDEX: 48.32 KG/M2 | SYSTOLIC BLOOD PRESSURE: 142 MMHG | WEIGHT: 290 LBS | HEIGHT: 65 IN | HEART RATE: 98 BPM

## 2021-02-23 DIAGNOSIS — E66.01 MORBID OBESITY (HCC): ICD-10-CM

## 2021-02-23 DIAGNOSIS — Z01.810 PREOP CARDIOVASCULAR EXAM: Primary | ICD-10-CM

## 2021-02-23 PROCEDURE — 99202 OFFICE O/P NEW SF 15 MIN: CPT | Performed by: INTERNAL MEDICINE

## 2021-02-23 PROCEDURE — 93000 ELECTROCARDIOGRAM COMPLETE: CPT | Performed by: INTERNAL MEDICINE

## 2021-02-23 NOTE — PROGRESS NOTES
Cardiology Follow Up    [facility]  1995  [id number]  [facility] [address] [address] [address] [address]  [phone]    1  Preop cardiovascular exam     2  Morbid obesity [facility])  Ambulatory referral to Cardiology       Interval History:  Cardiology consultation, preoperative cardiac clearance  49-year-old female who has no previous cardiac history  Being considered for bariatric surgery  Overweight most of her adult life, she is active, the present time she denies any symptoms chest pain or dyspnea, no exertional symptoms,   Sedentary lifestyle  recent polysomnogram suggested no obstructive sleep apnea, although his sleep hygiene is somewhat disrupted  Recent lipid profile total cholesterol 108, HDL 36  trigs 150 patient is nonsmoker  No bleeding diatheses  She does have suffer from hypertension for last 2-3 years, she was prescribed atenolol, she does not recall the dose  However she does not take it regularly, maybe once or twice a week  Last dose was 4 days ago, blood pressure today is 142/82  I did ask her to be more compliant with her medical regimen, she states been compliant with low-sodium diet      Patient Active Problem List   Diagnosis    ASCENCION (obstructive sleep apnea)    HTN (hypertension)    Morbid obesity with BMI of 45 0-49 9, adult ([address] )    Preop cardiovascular exam     Past Medical History:   Diagnosis Date    Hypertension     Obesity     PCOS (polycystic ovarian syndrome)      Social History     Socioeconomic History    Marital status: /Civil Union     Spouse name: Not on file    Number of children: Not on file    Years of education: Not on file    Highest education level: Not on file   Occupational History    Not on file   Social Needs    Financial resource strain: Not on file    Food insecurity     Worry: Not on file     Inability: Not on file   [facility] Transportation needs     Medical: Not on file     Non-medical: Not on file   Tobacco Use    Smoking status: Former Smoker     Types: Cigarettes     Quit date: 2017     Years since quittin 1    Smokeless tobacco: Never Used    Tobacco comment: patient admits to hx of 1 or 2 cigarettes a year   Substance and Sexual Activity    Alcohol use: No    Drug use: No    Sexual activity: Yes     Partners: Male     Birth control/protection: OCP   Lifestyle    Physical activity     Days per week: Not on file     Minutes per session: Not on file    Stress: Not on file   Relationships    Social connections     Talks on phone: Not on file     Gets together: Not on file     Attends Sikhism service: Not on file     Active member of club or organization: Not on file     Attends meetings of clubs or organizations: Not on file     Relationship status: Not on file    Intimate partner violence     Fear of current or ex partner: Not on file     Emotionally abused: Not on file     Physically abused: Not on file     Forced sexual activity: Not on file   Other Topics Concern    Not on file   Social History Narrative    Not on file      Family History   Problem Relation Age of Onset    Thyroid disease Mother     Hypertension Father     Diabetes Father     Hyperlipidemia Father     Cancer Neg Hx     Stroke Neg Hx      Past Surgical History:   Procedure Laterality Date    NO PAST SURGERIES         Current Outpatient Medications:     ALPRAZolam (XANAX) 0 5 mg tablet, Take 1 tablet (0 5 mg total) by mouth 3 (three) times a day as needed for anxiety or sleep for up to 10 days (Patient not taking: Reported on 2020), Disp: 20 tablet, Rfl: 0    ciclopirox (LOPROX) 0 77 % cream, Apply topically 2 (two) times a day (Patient not taking: Reported on 2020), Disp: 30 g, Rfl: 0    diazepam (VALIUM) 5 mg tablet, Take 1 tablet (5 mg total) by mouth every 8 (eight) hours as needed for muscle spasms for up to 10 doses (Patient not taking: Reported on 9/9/2020), Disp: 10 tablet, Rfl: 0    zolpidem (AMBIEN) 5 mg tablet, Take 1 tablet (5 mg total) by mouth as needed for sleep (for use during sleep study) for up to 2 days (Patient not taking: Reported on 10/16/2020), Disp: 2 tablet, Rfl: 0  No Known Allergies    Labs:  Hospital Outpatient Visit on 02/17/2021   Component Date Value    EXT Preg Test, Ur 02/17/2021 Negative     Control 02/17/2021 Valid     Case Report 02/17/2021                      Value:Surgical Pathology Report                         Case: [id number]                                   Authorizing Provider:  Cayla Begum MD         Collected:           02/17/2021 0850              Ordering Location:     [facility]        Received:            02/17/2021 [address] [place] [address] Endoscopy                                                          Pathologist:           Veva Sandifer, DO                                                     Specimen:    Stomach, gastric bx-r/o h pylori                                                           Final Diagnosis 02/17/2021                      Value: This result contains rich text formatting which cannot be displayed here   Note 02/17/2021                      Value: This result contains rich text formatting which cannot be displayed here   Additional Information 02/17/2021                      Value: This result contains rich text formatting which cannot be displayed here  Umana Gross Description 02/17/2021                      Value: This result contains rich text formatting which cannot be displayed here     Lab on 11/30/2020   Component Date Value    WBC 11/30/2020 8 50     RBC 11/30/2020 4 99     Hemoglobin 11/30/2020 13 4     Hematocrit 11/30/2020 42 5     MCV 11/30/2020 85     MCH 11/30/2020 26 9     MCHC 11/30/2020 31 5     RDW 11/30/2020 12 9     MPV 11/30/2020 10 7     Platelets 83/02/8521 310     nRBC 11/30/2020 0     Neutrophils Relative 11/30/2020 55     Immat GRANS % 11/30/2020 1     Lymphocytes Relative 11/30/2020 31     Monocytes Relative 11/30/2020 6     Eosinophils Relative 11/30/2020 6     Basophils Relative 11/30/2020 1     Neutrophils Absolute 11/30/2020 4 71     Immature Grans Absolute 11/30/2020 0 05     Lymphocytes Absolute 11/30/2020 2 66     Monocytes Absolute 11/30/2020 0 51     Eosinophils Absolute 11/30/2020 0 52     Basophils Absolute 11/30/2020 0 05     Sodium 11/30/2020 137     Potassium 11/30/2020 4 1     Chloride 11/30/2020 106     CO2 11/30/2020 25     ANION GAP 11/30/2020 6     BUN 11/30/2020 18     Creatinine 11/30/2020 0 75     Glucose, Fasting 11/30/2020 76     Calcium 11/30/2020 9 1     Corrected Calcium 11/30/2020 9 7     AST 11/30/2020 20     ALT 11/30/2020 35     Alkaline Phosphatase 11/30/2020 78     Total Protein 11/30/2020 7 7     Albumin 11/30/2020 3 2*    Total Bilirubin 11/30/2020 0 36     eGFR 11/30/2020 110     Cholesterol 11/30/2020 208*    Triglycerides 11/30/2020 150     HDL, Direct 11/30/2020 36*    LDL Calculated 11/30/2020 142*    Non-HDL-Chol (CHOL-HDL) 11/30/2020 172     TSH 3RD GENERATON 11/30/2020 1 820      Imaging: No results found  Review of Systems:  Review of Systems   Constitutional: Negative for activity change, fatigue and unexpected weight change  HENT: Negative for nosebleeds, sore throat and trouble swallowing  Eyes: Negative for visual disturbance  Respiratory: Negative for apnea, chest tightness, shortness of breath, wheezing and stridor  Cardiovascular: Negative for chest pain, palpitations and leg swelling  Gastrointestinal: Negative for abdominal pain, anal bleeding and blood in stool  Endocrine: Negative for cold intolerance and heat intolerance  Genitourinary: Negative for difficulty urinating, dysuria, hematuria and urgency  Musculoskeletal: Negative for arthralgias, gait problem and myalgias     Skin: Negative for color change, pallor, rash and wound  Allergic/Immunologic: Negative for immunocompromised state  Neurological: Negative for dizziness, tremors, syncope, speech difficulty and weakness  Hematological: Does not bruise/bleed easily  Psychiatric/Behavioral: Positive for sleep disturbance  Negative for behavioral problems, decreased concentration and dysphoric mood  The patient is not nervous/anxious  Physical Exam:  Physical Exam  Vitals signs reviewed  Constitutional:       General: She is not in acute distress  Appearance: Normal appearance  She is obese  She is not ill-appearing, toxic-appearing or diaphoretic  HENT:      Head: Normocephalic  Eyes:      General: No scleral icterus  Conjunctiva/sclera: Conjunctivae normal    Neck:      Vascular: No carotid bruit  Cardiovascular:      Rate and Rhythm: Normal rate and regular rhythm  Pulses: Normal pulses  Heart sounds: Normal heart sounds  No murmur  No friction rub  No gallop  Comments: Distant heart sounds  Pulmonary:      Effort: Pulmonary effort is normal  No respiratory distress  Breath sounds: Normal breath sounds  No stridor  No wheezing, rhonchi or rales  Abdominal:      General: Bowel sounds are normal       Palpations: Abdomen is soft  Tenderness: There is no abdominal tenderness  There is no rebound  Musculoskeletal:      Right lower leg: No edema  Left lower leg: No edema  Skin:     General: Skin is warm and dry  Capillary Refill: Capillary refill takes less than 2 seconds  Coloration: Skin is not jaundiced  Findings: No bruising or erythema  Neurological:      General: No focal deficit present  Mental Status: She is alert and oriented to person, place, and time  Psychiatric:         Mood and Affect: Mood normal          Behavior: Behavior normal          Thought Content:  Thought content normal          Judgment: Judgment normal  Discussion/Summary:  Preoperative cardiac clearance, no structural heart disease by clinical examination, EKG unremarkable, will do stress test to assess functional  Capacity, clearance pending the results of the testing, anticipate to be unrevealing  This note was completed in part utilizing m-DGTS fluency direct voice recognition software  Grammatical errors, random word insertion, spelling mistakes, and incomplete sentences may be an occasional consequence of the system secondary to software limitations, ambient noise and hardware issues  At the time of dictation, efforts were made to edit, clarify and /or correct errors  Please read the chart carefully and recognize, using context, where substitutions have occurred  If you have any questions or concerns about the context, text or information contained within the body of this dictation, please contact myself, the provider, for further clarification

## 2021-03-03 LAB
ALBUMIN SERPL BCP-MCNC: 3.4 G/DL (ref 3.5–5)
ALP SERPL-CCNC: 95 U/L (ref 46–116)
ALT SERPL W P-5'-P-CCNC: 46 U/L (ref 12–78)
ANION GAP SERPL CALCULATED.3IONS-SCNC: 8 MMOL/L (ref 4–13)
AST SERPL W P-5'-P-CCNC: 21 U/L (ref 5–45)
BILIRUB SERPL-MCNC: 0.41 MG/DL (ref 0.2–1)
BUN SERPL-MCNC: 13 MG/DL (ref 5–25)
CALCIUM ALBUM COR SERPL-MCNC: 9.3 MG/DL (ref 8.3–10.1)
CALCIUM SERPL-MCNC: 8.8 MG/DL (ref 8.3–10.1)
CHLORIDE SERPL-SCNC: 101 MMOL/L (ref 100–108)
CO2 SERPL-SCNC: 26 MMOL/L (ref 21–32)
CREAT SERPL-MCNC: 0.79 MG/DL (ref 0.6–1.3)
GFR SERPL CREATININE-BSD FRML MDRD: 103 ML/MIN/1.73SQ M
GLUCOSE SERPL-MCNC: 127 MG/DL (ref 65–140)
POTASSIUM SERPL-SCNC: 3.3 MMOL/L (ref 3.5–5.3)
PROT SERPL-MCNC: 8.2 G/DL (ref 6.4–8.2)
SODIUM SERPL-SCNC: 135 MMOL/L (ref 136–145)
TROPONIN I SERPL-MCNC: <0.02 NG/ML

## 2021-03-03 PROCEDURE — 84484 ASSAY OF TROPONIN QUANT: CPT | Performed by: EMERGENCY MEDICINE

## 2021-03-03 PROCEDURE — 83735 ASSAY OF MAGNESIUM: CPT | Performed by: EMERGENCY MEDICINE

## 2021-03-03 PROCEDURE — 82553 CREATINE MB FRACTION: CPT | Performed by: EMERGENCY MEDICINE

## 2021-03-03 PROCEDURE — 80053 COMPREHEN METABOLIC PANEL: CPT | Performed by: EMERGENCY MEDICINE

## 2021-03-03 PROCEDURE — 86141 C-REACTIVE PROTEIN HS: CPT | Performed by: EMERGENCY MEDICINE

## 2021-03-03 PROCEDURE — 82550 ASSAY OF CK (CPK): CPT | Performed by: EMERGENCY MEDICINE

## 2021-03-03 PROCEDURE — 36415 COLL VENOUS BLD VENIPUNCTURE: CPT

## 2021-03-03 PROCEDURE — 99285 EMERGENCY DEPT VISIT HI MDM: CPT

## 2021-03-03 PROCEDURE — 84443 ASSAY THYROID STIM HORMONE: CPT | Performed by: EMERGENCY MEDICINE

## 2021-03-03 PROCEDURE — 83880 ASSAY OF NATRIURETIC PEPTIDE: CPT | Performed by: EMERGENCY MEDICINE

## 2021-03-03 PROCEDURE — 84703 CHORIONIC GONADOTROPIN ASSAY: CPT | Performed by: EMERGENCY MEDICINE

## 2021-03-04 ENCOUNTER — APPOINTMENT (OUTPATIENT)
Dept: VASCULAR ULTRASOUND | Facility: HOSPITAL | Age: 26
End: 2021-03-04
Payer: COMMERCIAL

## 2021-03-04 ENCOUNTER — APPOINTMENT (OUTPATIENT)
Dept: RADIOLOGY | Facility: HOSPITAL | Age: 26
End: 2021-03-04
Payer: COMMERCIAL

## 2021-03-04 ENCOUNTER — APPOINTMENT (EMERGENCY)
Dept: RADIOLOGY | Facility: HOSPITAL | Age: 26
End: 2021-03-04
Payer: COMMERCIAL

## 2021-03-04 ENCOUNTER — HOSPITAL ENCOUNTER (OUTPATIENT)
Facility: HOSPITAL | Age: 26
Setting detail: OBSERVATION
Discharge: HOME | End: 2021-03-06
Attending: EMERGENCY MEDICINE | Admitting: INTERNAL MEDICINE
Payer: COMMERCIAL

## 2021-03-04 ENCOUNTER — APPOINTMENT (OUTPATIENT)
Dept: CT IMAGING | Facility: HOSPITAL | Age: 26
End: 2021-03-04
Payer: COMMERCIAL

## 2021-03-04 ENCOUNTER — APPOINTMENT (OUTPATIENT)
Dept: MRI IMAGING | Facility: HOSPITAL | Age: 26
End: 2021-03-04
Payer: COMMERCIAL

## 2021-03-04 DIAGNOSIS — M79.605 BILATERAL LEG PAIN: ICD-10-CM

## 2021-03-04 DIAGNOSIS — R60.9 PERIPHERAL EDEMA: Primary | ICD-10-CM

## 2021-03-04 DIAGNOSIS — L03.115 BILATERAL CELLULITIS OF LOWER LEG: ICD-10-CM

## 2021-03-04 DIAGNOSIS — E04.1 THYROID NODULE: ICD-10-CM

## 2021-03-04 DIAGNOSIS — R29.898 WEAKNESS OF BOTH LOWER EXTREMITIES: ICD-10-CM

## 2021-03-04 DIAGNOSIS — M79.604 BILATERAL LEG PAIN: ICD-10-CM

## 2021-03-04 DIAGNOSIS — L03.116 BILATERAL CELLULITIS OF LOWER LEG: ICD-10-CM

## 2021-03-04 DIAGNOSIS — R26.2 AMBULATORY DYSFUNCTION: ICD-10-CM

## 2021-03-04 PROBLEM — M79.89 LEG SWELLING: Status: ACTIVE | Noted: 2021-03-04

## 2021-03-04 LAB
ANION GAP SERPL CALCULATED.3IONS-SCNC: 8 MMOL/L (ref 4–13)
APTT PPP: 30 SECONDS (ref 23–37)
ATRIAL RATE: 92 BPM
BASOPHILS # BLD AUTO: 0.06 THOUSANDS/ΜL (ref 0–0.1)
BASOPHILS # BLD AUTO: 0.06 THOUSANDS/ΜL (ref 0–0.1)
BASOPHILS NFR BLD AUTO: 1 % (ref 0–1)
BASOPHILS NFR BLD AUTO: 1 % (ref 0–1)
BILIRUB UR QL STRIP: NEGATIVE
BUN SERPL-MCNC: 11 MG/DL (ref 5–25)
CALCIUM SERPL-MCNC: 8.7 MG/DL (ref 8.3–10.1)
CHLORIDE SERPL-SCNC: 102 MMOL/L (ref 100–108)
CK MB SERPL-MCNC: 1.7 % (ref 0–2.5)
CK MB SERPL-MCNC: 2.4 NG/ML (ref 0–5)
CK SERPL-CCNC: 138 U/L (ref 26–192)
CLARITY UR: CLEAR
CO2 SERPL-SCNC: 26 MMOL/L (ref 21–32)
COLOR UR: YELLOW
CREAT SERPL-MCNC: 0.72 MG/DL (ref 0.6–1.3)
CRP SERPL HS-MCNC: 42.32 MG/L
D DIMER PPP FEU-MCNC: <0.27 UG/ML FEU
EOSINOPHIL # BLD AUTO: 0.41 THOUSAND/ΜL (ref 0–0.61)
EOSINOPHIL # BLD AUTO: 0.43 THOUSAND/ΜL (ref 0–0.61)
EOSINOPHIL NFR BLD AUTO: 4 % (ref 0–6)
EOSINOPHIL NFR BLD AUTO: 4 % (ref 0–6)
ERYTHROCYTE [DISTWIDTH] IN BLOOD BY AUTOMATED COUNT: 13 % (ref 11.6–15.1)
ERYTHROCYTE [DISTWIDTH] IN BLOOD BY AUTOMATED COUNT: 13.1 % (ref 11.6–15.1)
GFR SERPL CREATININE-BSD FRML MDRD: 115 ML/MIN/1.73SQ M
GLUCOSE SERPL-MCNC: 168 MG/DL (ref 65–140)
GLUCOSE UR STRIP-MCNC: NEGATIVE MG/DL
HCG SERPL QL: NEGATIVE
HCT VFR BLD AUTO: 39 % (ref 34.8–46.1)
HCT VFR BLD AUTO: 39.9 % (ref 34.8–46.1)
HGB BLD-MCNC: 12.5 G/DL (ref 11.5–15.4)
HGB BLD-MCNC: 12.9 G/DL (ref 11.5–15.4)
HGB UR QL STRIP.AUTO: NEGATIVE
IMM GRANULOCYTES # BLD AUTO: 0.04 THOUSAND/UL (ref 0–0.2)
IMM GRANULOCYTES # BLD AUTO: 0.05 THOUSAND/UL (ref 0–0.2)
IMM GRANULOCYTES NFR BLD AUTO: 0 % (ref 0–2)
IMM GRANULOCYTES NFR BLD AUTO: 0 % (ref 0–2)
INR PPP: 1.01 (ref 0.84–1.19)
KETONES UR STRIP-MCNC: NEGATIVE MG/DL
LACTATE SERPL-SCNC: 1.4 MMOL/L (ref 0.5–2)
LEUKOCYTE ESTERASE UR QL STRIP: NEGATIVE
LYMPHOCYTES # BLD AUTO: 2.85 THOUSANDS/ΜL (ref 0.6–4.47)
LYMPHOCYTES # BLD AUTO: 3.36 THOUSANDS/ΜL (ref 0.6–4.47)
LYMPHOCYTES NFR BLD AUTO: 30 % (ref 14–44)
LYMPHOCYTES NFR BLD AUTO: 30 % (ref 14–44)
MAGNESIUM SERPL-MCNC: 2 MG/DL (ref 1.6–2.6)
MCH RBC QN AUTO: 26.2 PG (ref 26.8–34.3)
MCH RBC QN AUTO: 26.9 PG (ref 26.8–34.3)
MCHC RBC AUTO-ENTMCNC: 32.1 G/DL (ref 31.4–37.4)
MCHC RBC AUTO-ENTMCNC: 32.3 G/DL (ref 31.4–37.4)
MCV RBC AUTO: 81 FL (ref 82–98)
MCV RBC AUTO: 84 FL (ref 82–98)
MONOCYTES # BLD AUTO: 0.64 THOUSAND/ΜL (ref 0.17–1.22)
MONOCYTES # BLD AUTO: 1.02 THOUSAND/ΜL (ref 0.17–1.22)
MONOCYTES NFR BLD AUTO: 7 % (ref 4–12)
MONOCYTES NFR BLD AUTO: 9 % (ref 4–12)
NEUTROPHILS # BLD AUTO: 5.66 THOUSANDS/ΜL (ref 1.85–7.62)
NEUTROPHILS # BLD AUTO: 6.34 THOUSANDS/ΜL (ref 1.85–7.62)
NEUTS SEG NFR BLD AUTO: 56 % (ref 43–75)
NEUTS SEG NFR BLD AUTO: 58 % (ref 43–75)
NITRITE UR QL STRIP: NEGATIVE
NRBC BLD AUTO-RTO: 0 /100 WBCS
NRBC BLD AUTO-RTO: 0 /100 WBCS
NT-PROBNP SERPL-MCNC: 6 PG/ML
P AXIS: 14 DEGREES
PH UR STRIP.AUTO: 7.5 [PH]
PLATELET # BLD AUTO: 303 THOUSANDS/UL (ref 149–390)
PLATELET # BLD AUTO: 303 THOUSANDS/UL (ref 149–390)
PLATELET # BLD AUTO: 324 THOUSANDS/UL (ref 149–390)
PMV BLD AUTO: 10.5 FL (ref 8.9–12.7)
PMV BLD AUTO: 10.6 FL (ref 8.9–12.7)
PMV BLD AUTO: 10.6 FL (ref 8.9–12.7)
POTASSIUM SERPL-SCNC: 3.8 MMOL/L (ref 3.5–5.3)
PR INTERVAL: 150 MS
PROT UR STRIP-MCNC: NEGATIVE MG/DL
PROTHROMBIN TIME: 13.4 SECONDS (ref 11.6–14.5)
QRS AXIS: 31 DEGREES
QRSD INTERVAL: 80 MS
QT INTERVAL: 344 MS
QTC INTERVAL: 425 MS
RBC # BLD AUTO: 4.64 MILLION/UL (ref 3.81–5.12)
RBC # BLD AUTO: 4.93 MILLION/UL (ref 3.81–5.12)
SODIUM SERPL-SCNC: 136 MMOL/L (ref 136–145)
SP GR UR STRIP.AUTO: 1.02 (ref 1–1.03)
T WAVE AXIS: 7 DEGREES
TSH SERPL DL<=0.05 MIU/L-ACNC: 3 UIU/ML (ref 0.36–3.74)
UROBILINOGEN UR QL STRIP.AUTO: 0.2 E.U./DL
VENTRICULAR RATE: 92 BPM
WBC # BLD AUTO: 11.26 THOUSAND/UL (ref 4.31–10.16)
WBC # BLD AUTO: 9.66 THOUSAND/UL (ref 4.31–10.16)

## 2021-03-04 PROCEDURE — 99214 OFFICE O/P EST MOD 30 MIN: CPT | Performed by: PSYCHIATRY & NEUROLOGY

## 2021-03-04 PROCEDURE — 87040 BLOOD CULTURE FOR BACTERIA: CPT | Performed by: EMERGENCY MEDICINE

## 2021-03-04 PROCEDURE — G1004 CDSM NDSC: HCPCS

## 2021-03-04 PROCEDURE — 85049 AUTOMATED PLATELET COUNT: CPT | Performed by: FAMILY MEDICINE

## 2021-03-04 PROCEDURE — 96374 THER/PROPH/DIAG INJ IV PUSH: CPT

## 2021-03-04 PROCEDURE — 99285 EMERGENCY DEPT VISIT HI MDM: CPT | Performed by: EMERGENCY MEDICINE

## 2021-03-04 PROCEDURE — 85610 PROTHROMBIN TIME: CPT | Performed by: EMERGENCY MEDICINE

## 2021-03-04 PROCEDURE — 73590 X-RAY EXAM OF LOWER LEG: CPT

## 2021-03-04 PROCEDURE — 93970 EXTREMITY STUDY: CPT | Performed by: SURGERY

## 2021-03-04 PROCEDURE — 86038 ANTINUCLEAR ANTIBODIES: CPT | Performed by: FAMILY MEDICINE

## 2021-03-04 PROCEDURE — 74177 CT ABD & PELVIS W/CONTRAST: CPT

## 2021-03-04 PROCEDURE — 85379 FIBRIN DEGRADATION QUANT: CPT | Performed by: EMERGENCY MEDICINE

## 2021-03-04 PROCEDURE — 83605 ASSAY OF LACTIC ACID: CPT | Performed by: EMERGENCY MEDICINE

## 2021-03-04 PROCEDURE — 36415 COLL VENOUS BLD VENIPUNCTURE: CPT | Performed by: EMERGENCY MEDICINE

## 2021-03-04 PROCEDURE — 85730 THROMBOPLASTIN TIME PARTIAL: CPT | Performed by: EMERGENCY MEDICINE

## 2021-03-04 PROCEDURE — 99220 PR INITIAL OBSERVATION CARE/DAY 70 MINUTES: CPT | Performed by: INTERNAL MEDICINE

## 2021-03-04 PROCEDURE — 93005 ELECTROCARDIOGRAM TRACING: CPT

## 2021-03-04 PROCEDURE — 72100 X-RAY EXAM L-S SPINE 2/3 VWS: CPT

## 2021-03-04 PROCEDURE — 80048 BASIC METABOLIC PNL TOTAL CA: CPT | Performed by: FAMILY MEDICINE

## 2021-03-04 PROCEDURE — 85025 COMPLETE CBC W/AUTO DIFF WBC: CPT | Performed by: FAMILY MEDICINE

## 2021-03-04 PROCEDURE — 71260 CT THORAX DX C+: CPT

## 2021-03-04 PROCEDURE — 93010 ELECTROCARDIOGRAM REPORT: CPT | Performed by: INTERNAL MEDICINE

## 2021-03-04 PROCEDURE — 71045 X-RAY EXAM CHEST 1 VIEW: CPT

## 2021-03-04 PROCEDURE — 93970 EXTREMITY STUDY: CPT

## 2021-03-04 PROCEDURE — 85025 COMPLETE CBC W/AUTO DIFF WBC: CPT | Performed by: EMERGENCY MEDICINE

## 2021-03-04 PROCEDURE — 72148 MRI LUMBAR SPINE W/O DYE: CPT

## 2021-03-04 PROCEDURE — 73701 CT LOWER EXTREMITY W/DYE: CPT

## 2021-03-04 PROCEDURE — 81003 URINALYSIS AUTO W/O SCOPE: CPT | Performed by: EMERGENCY MEDICINE

## 2021-03-04 RX ORDER — CEFAZOLIN SODIUM 1 G/50ML
1000 SOLUTION INTRAVENOUS ONCE
Status: COMPLETED | OUTPATIENT
Start: 2021-03-04 | End: 2021-03-04

## 2021-03-04 RX ORDER — HYDROMORPHONE HCL/PF 1 MG/ML
0.2 SYRINGE (ML) INJECTION ONCE
Status: COMPLETED | OUTPATIENT
Start: 2021-03-04 | End: 2021-03-04

## 2021-03-04 RX ORDER — DEXAMETHASONE SODIUM PHOSPHATE 4 MG/ML
6 INJECTION, SOLUTION INTRA-ARTICULAR; INTRALESIONAL; INTRAMUSCULAR; INTRAVENOUS; SOFT TISSUE EVERY 6 HOURS SCHEDULED
Status: DISCONTINUED | OUTPATIENT
Start: 2021-03-04 | End: 2021-03-06

## 2021-03-04 RX ORDER — ONDANSETRON 2 MG/ML
4 INJECTION INTRAMUSCULAR; INTRAVENOUS EVERY 6 HOURS PRN
Status: DISCONTINUED | OUTPATIENT
Start: 2021-03-04 | End: 2021-03-06 | Stop reason: HOSPADM

## 2021-03-04 RX ORDER — ACETAMINOPHEN 325 MG/1
650 TABLET ORAL EVERY 6 HOURS PRN
Status: DISCONTINUED | OUTPATIENT
Start: 2021-03-04 | End: 2021-03-06 | Stop reason: HOSPADM

## 2021-03-04 RX ORDER — SODIUM CHLORIDE 9 MG/ML
125 INJECTION, SOLUTION INTRAVENOUS CONTINUOUS
Status: DISCONTINUED | OUTPATIENT
Start: 2021-03-04 | End: 2021-03-06

## 2021-03-04 RX ORDER — METAXALONE 800 MG/1
800 TABLET ORAL 3 TIMES DAILY
Status: DISCONTINUED | OUTPATIENT
Start: 2021-03-04 | End: 2021-03-05

## 2021-03-04 RX ORDER — ONDANSETRON 2 MG/ML
4 INJECTION INTRAMUSCULAR; INTRAVENOUS ONCE
Status: DISCONTINUED | OUTPATIENT
Start: 2021-03-04 | End: 2021-03-06 | Stop reason: HOSPADM

## 2021-03-04 RX ORDER — POTASSIUM CHLORIDE 20 MEQ/1
40 TABLET, EXTENDED RELEASE ORAL ONCE
Status: COMPLETED | OUTPATIENT
Start: 2021-03-04 | End: 2021-03-04

## 2021-03-04 RX ADMIN — METAXALONE 800 MG: 800 TABLET ORAL at 21:49

## 2021-03-04 RX ADMIN — SODIUM CHLORIDE 125 ML/HR: 0.9 INJECTION, SOLUTION INTRAVENOUS at 21:51

## 2021-03-04 RX ADMIN — IOHEXOL 100 ML: 350 INJECTION, SOLUTION INTRAVENOUS at 13:22

## 2021-03-04 RX ADMIN — ENOXAPARIN SODIUM 40 MG: 40 INJECTION SUBCUTANEOUS at 10:12

## 2021-03-04 RX ADMIN — SODIUM CHLORIDE 125 ML/HR: 0.9 INJECTION, SOLUTION INTRAVENOUS at 16:29

## 2021-03-04 RX ADMIN — DEXAMETHASONE SODIUM PHOSPHATE 6 MG: 4 INJECTION, SOLUTION INTRA-ARTICULAR; INTRALESIONAL; INTRAMUSCULAR; INTRAVENOUS; SOFT TISSUE at 17:10

## 2021-03-04 RX ADMIN — DEXAMETHASONE SODIUM PHOSPHATE 6 MG: 4 INJECTION, SOLUTION INTRA-ARTICULAR; INTRALESIONAL; INTRAMUSCULAR; INTRAVENOUS; SOFT TISSUE at 23:05

## 2021-03-04 RX ADMIN — SODIUM CHLORIDE 125 ML/HR: 0.9 INJECTION, SOLUTION INTRAVENOUS at 06:29

## 2021-03-04 RX ADMIN — DEXAMETHASONE SODIUM PHOSPHATE 6 MG: 4 INJECTION, SOLUTION INTRA-ARTICULAR; INTRALESIONAL; INTRAMUSCULAR; INTRAVENOUS; SOFT TISSUE at 14:02

## 2021-03-04 RX ADMIN — ENOXAPARIN SODIUM 40 MG: 40 INJECTION SUBCUTANEOUS at 17:10

## 2021-03-04 RX ADMIN — POTASSIUM CHLORIDE 40 MEQ: 1500 TABLET, EXTENDED RELEASE ORAL at 01:37

## 2021-03-04 RX ADMIN — CEFAZOLIN SODIUM 1000 MG: 1 SOLUTION INTRAVENOUS at 04:14

## 2021-03-04 RX ADMIN — METAXALONE 800 MG: 800 TABLET ORAL at 16:39

## 2021-03-04 RX ADMIN — HYDROMORPHONE HYDROCHLORIDE 0.2 MG: 1 INJECTION, SOLUTION INTRAMUSCULAR; INTRAVENOUS; SUBCUTANEOUS at 01:37

## 2021-03-04 NOTE — ASSESSMENT & PLAN NOTE
78-year-old female with PCOS and morbid obesity who presented yesterday with several days of musculoskeletal tenderness and heaviness/edema of bilateral lower extremities  Today as well on exam reveals mild sensory abnormalities, but with maximum effort, patient can demonstrate full strength and reflexes are WNL  She continues to report severe pain particularly on the left lower extremity proximally more so than the right with any degree of palpitation or manipulation  Her MRI of L-spine done last night although with some degree of disc bulging is mildly noncompressive at L5-S1  Radiology notes a prominent epidural fat at L5-S1 results in developmental tapering of the thecal sac  Her CK was normal   Her inflammatory markers including her sed rate, markedly elevated at 59 for a woman of her age at 30 are clearly positive  At this point her inflammatory markers and her nearly unremarkable MRI plan to an inflammatory condition possibly polymyalgia rheumatica  We have discussed this with the patient and her mother present at this time  - Serum studies Pending: CRP, MERCED, ANCA, ACE, Lyme, Anti-scleroderman, Centromere antibody, RF  - transition her Decadron steroids to p o  And maintain and muscle relaxer    - PT/OT as tolerated   - Medical management and supportive care per primary team  Correction of any metabolic or infectious disturbances

## 2021-03-04 NOTE — ED PROVIDER NOTES
History  Chief Complaint   Patient presents with    Leg Swelling     swelling and warm to touch bilateral legs getting increasing worse, today cannot walk      Patient is a 30year old female with increased pain and swelling of both legs and worse on left side since past 2 days  No trauma  No travel  No chest pain or sob  No fever  No N/V  LMP - 6 months ago and has PCOS  Difficulty walking due to pain and swelling  Was last seen at [facility] on 7/12/20 for sciatica  [facility] website checked on this patient and last Rx filled was on 9/25/20 for ambien for 2 day supply  (+) numbness of left foot/toes  Patient walking with a walker  History provided by:  Patient and relative (sister)   used: No        Prior to Admission Medications   Prescriptions Last Dose Informant Patient Reported? Taking?    ALPRAZolam (XANAX) 0 5 mg tablet   No No   Sig: Take 1 tablet (0 5 mg total) by mouth 3 (three) times a day as needed for anxiety or sleep for up to 10 days   Patient not taking: Reported on 9/25/2020   ciclopirox (LOPROX) 0 77 % cream Not Taking at Unknown time Self No No   Sig: Apply topically 2 (two) times a day   Patient not taking: Reported on 9/9/2020   diazepam (VALIUM) 5 mg tablet Not Taking at Unknown time Self No No   Sig: Take 1 tablet (5 mg total) by mouth every 8 (eight) hours as needed for muscle spasms for up to 10 doses   Patient not taking: Reported on 9/9/2020   zolpidem (AMBIEN) 5 mg tablet   No No   Sig: Take 1 tablet (5 mg total) by mouth as needed for sleep (for use during sleep study) for up to 2 days   Patient not taking: Reported on 10/16/2020      Facility-Administered Medications: None       Past Medical History:   Diagnosis Date    Hypertension     Obesity     PCOS (polycystic ovarian syndrome)        Past Surgical History:   Procedure Laterality Date    NO PAST SURGERIES         Family History   Problem Relation Age of Onset    Thyroid disease Mother     Hypertension Father     Diabetes Father     Hyperlipidemia Father     Cancer Neg Hx     Stroke Neg Hx      I have reviewed and agree with the history as documented  E-Cigarette/Vaping    E-Cigarette Use Never User      E-Cigarette/Vaping Substances    Nicotine No     THC No     CBD No     Flavoring No     Other No     Unknown No      Social History     Tobacco Use    Smoking status: Former Smoker     Types: Cigarettes     Quit date:      Years since quittin 1    Smokeless tobacco: Never Used    Tobacco comment: patient admits to hx of 1 or 2 cigarettes a year   Substance Use Topics    Alcohol use: No    Drug use: No       Review of Systems   Constitutional: Negative for fever  Respiratory: Negative for cough and shortness of breath  Cardiovascular: Positive for leg swelling  Negative for chest pain  Gastrointestinal: Negative for nausea and vomiting  Musculoskeletal: Positive for myalgias  Neurological: Positive for numbness  All other systems reviewed and are negative  Physical Exam  Physical Exam  Vitals signs and nursing note reviewed  Constitutional:       General: She is in acute distress (moderate)  HENT:      Head: Normocephalic and atraumatic  Mouth/Throat:      Comments: Mask in place  Eyes:      General: No scleral icterus  Neck:      Musculoskeletal: Normal range of motion and neck supple  Cardiovascular:      Rate and Rhythm: Regular rhythm  Tachycardia present  Heart sounds: Normal heart sounds  No murmur  Pulmonary:      Effort: Pulmonary effort is normal  No respiratory distress  Breath sounds: Normal breath sounds  No stridor  No wheezing, rhonchi or rales  Abdominal:      General: Bowel sounds are normal       Palpations: Abdomen is soft  Tenderness: There is no abdominal tenderness  Musculoskeletal:         General: Tenderness (bilateral LE) present  No deformity  Right lower leg: Edema (large) present        Left lower leg: Edema (large) present  Skin:     General: Skin is warm and dry  Findings: No erythema or rash  Neurological:      General: No focal deficit present  Mental Status: She is alert and oriented to person, place, and time  Sensory: No sensory deficit  Psychiatric:      Comments: Somewhat anxious            Vital Signs  ED Triage Vitals [03/03/21 2233]   Temperature Pulse Respirations Blood Pressure SpO2   98 5 °F (36 9 °C) 103 18 137/76 100 %      Temp Source Heart Rate Source Patient Position - Orthostatic VS BP Location FiO2 (%)   Oral Monitor Sitting Left arm --      Pain Score       8           Vitals:    03/03/21 2233 03/04/21 0138 03/04/21 0245   BP: 137/76 118/73    Pulse: 103 92 96   Patient Position - Orthostatic VS: Sitting Sitting          Visual Acuity      ED Medications  Medications   ondansetron (ZOFRAN) injection 4 mg (4 mg Intravenous Not Given 3/4/21 0115)   ceFAZolin (ANCEF) IVPB (premix in dextrose) 1,000 mg 50 mL (1,000 mg Intravenous New Bag 3/4/21 0414)   sodium chloride 0 9 % infusion (has no administration in time range)   acetaminophen (TYLENOL) tablet 650 mg (has no administration in time range)   ondansetron (ZOFRAN) injection 4 mg (has no administration in time range)   enoxaparin (LOVENOX) subcutaneous injection 40 mg (has no administration in time range)   potassium chloride (K-DUR,KLOR-CON) CR tablet 40 mEq (40 mEq Oral Given 3/4/21 0137)   HYDROmorphone (DILAUDID) injection 0 2 mg (0 2 mg Intravenous Given 3/4/21 0137)       Diagnostic Studies  Results Reviewed     Procedure Component Value Units Date/Time    UA w Reflex to Microscopic w Reflex to Culture [[id number]] Collected: 03/04/21 0416    Lab Status: Final result Specimen: Urine, Clean Catch Updated: 03/04/21 0429     Color, UA Yellow     Clarity, UA Clear     Specific [place], UA 1 020     pH, UA 7 5     Leukocytes, UA Negative     Nitrite, UA Negative     Protein, UA Negative mg/dl      Glucose, UA Negative mg/dl Ketones, UA Negative mg/dl      Urobilinogen, UA 0 2 E U /dl      Bilirubin, UA Negative     Blood, UA Negative    Basic metabolic panel [[id number]]     Lab Status: No result Specimen: Blood     CBC and differential [[id number]]     Lab Status: No result Specimen: Blood     Platelet count [[id number]]     Lab Status: No result Specimen: Blood     MERCED Screen w/ Reflex to Titer/Pattern [[id number]]     Lab Status: No result Specimen: Blood     CBC and differential [[id number]]  (Abnormal) Collected: 03/04/21 0128    Lab Status: Final result Specimen: Blood from Arm, Right Updated: 03/04/21 0225     WBC 11 26 Thousand/uL      RBC 4 93 Million/uL      Hemoglobin 12 9 g/dL      Hematocrit 39 9 %      MCV 81 fL      MCH 26 2 pg      MCHC 32 3 g/dL      RDW 13 1 %      MPV 10 5 fL      Platelets 433 Thousands/uL      nRBC 0 /100 WBCs      Neutrophils Relative 56 %      Immat GRANS % 0 %      Lymphocytes Relative 30 %      Monocytes Relative 9 %      Eosinophils Relative 4 %      Basophils Relative 1 %      Neutrophils Absolute 6 34 Thousands/µL      Immature Grans Absolute 0 05 Thousand/uL      Lymphocytes Absolute 3 36 Thousands/µL      Monocytes Absolute 1 02 Thousand/µL      Eosinophils Absolute 0 43 Thousand/µL      Basophils Absolute 0 06 Thousands/µL     D-Dimer [[id number]]  (Normal) Collected: 03/04/21 0128    Lab Status: Final result Specimen: Blood from Arm, Right Updated: 03/04/21 0213     D-Dimer, Quant <0 27 ug/ml FEU     Lactic acid [[id number]]  (Normal) Collected: 03/04/21 0128    Lab Status: Final result Specimen: Blood from Arm, Right Updated: 03/04/21 0209     LACTIC ACID 1 4 mmol/L     Narrative:      Result may be elevated if tourniquet was used during collection      [employer] [[id number]]  (Normal) Collected: 03/04/21 0128    Lab Status: Final result Specimen: Blood from Arm, Right Updated: 03/04/21 0200     Protime 13 4 seconds      INR 1 01    APTT [[id number]]  (Normal) Collected: 03/04/21 0128    Lab Status: Final result Specimen: Blood from Arm, Right Updated: 03/04/21 0200     PTT 30 seconds     Blood culture #1 [[id number]] Collected: 03/04/21 0128    Lab Status: In process Specimen: Blood from Hand, Right Updated: 03/04/21 0145    Blood culture #2 [[id number]] Collected: 03/04/21 0128    Lab Status: In process Specimen: Blood from Arm, Right Updated: 03/04/21 0145    hCG, qualitative pregnancy [[id number]]  (Normal) Collected: 03/03/21 2244    Lab Status: Final result Specimen: Blood from Arm, Right Updated: 03/04/21 0143     Preg, Serum Negative    TSH, 3rd generation with Free T4 reflex [[id number]]  (Normal) Collected: 03/03/21 2244    Lab Status: Final result Specimen: Blood from Arm, Right Updated: 03/04/21 0143     TSH 3RD GENERATON 2 998 uIU/mL     Narrative:      Patients undergoing fluorescein dye angiography may retain small amounts of fluorescein in the body for 48-72 hours post procedure  Samples containing fluorescein can produce falsely depressed TSH values  If the patient had this procedure,a specimen should be resubmitted post fluorescein clearance        NT-BNP PRO [[id number]]  (Normal) Collected: 03/03/21 2244    Lab Status: Final result Specimen: Blood from Arm, Right Updated: 03/04/21 0143     NT-proBNP 6 pg/mL     High sensitivity CRP [[id number]] Collected: 03/03/21 2244    Lab Status: Final result Specimen: Blood from Arm, Right Updated: 03/04/21 0143     CRP, High Sensitivity 42 32 mg/L     Narrative:            HsCRP Level       Relative Risk           <1 0 mg/L          Low           1 0 to 3 0 mg/L    Average           >3 0 mg/L          High        Magnesium [[id number]]  (Normal) Collected: 03/03/21 2244    Lab Status: Final result Specimen: Blood from Arm, Right Updated: 03/04/21 0143     Magnesium 2 0 mg/dL     CKMB [[id number]]  (Normal) Collected: 03/03/21 2244    Lab Status: Final result Specimen: Blood from Arm, Right Updated: 03/04/21 0143     CK-MB Index 1 7 %      CK-MB 2 4 ng/mL     CK Total with Reflex CKMB [[id number]]  (Normal) Collected: 03/03/21 2244    Lab Status: Final result Specimen: Blood from Arm, Right Updated: 03/04/21 0138     Total  U/L     Comprehensive metabolic panel [[id number]]  (Abnormal) Collected: 03/03/21 2244    Lab Status: Final result Specimen: Blood from Arm, Right Updated: 03/03/21 2335     Sodium 135 mmol/L      Potassium 3 3 mmol/L      Chloride 101 mmol/L      CO2 26 mmol/L      ANION GAP 8 mmol/L      BUN 13 mg/dL      Creatinine 0 79 mg/dL      Glucose 127 mg/dL      Calcium 8 8 mg/dL      Corrected Calcium 9 3 mg/dL      AST 21 U/L      ALT 46 U/L      Alkaline Phosphatase 95 U/L      Total Protein 8 2 g/dL      Albumin 3 4 g/dL      Total Bilirubin 0 41 mg/dL      eGFR 103 ml/min/1 73sq m     Narrative:      [place] guidelines for Chronic Kidney Disease (CKD):     Stage 1 with normal or high GFR (GFR > 90 mL/min/1 73 square meters)    Stage 2 Mild CKD (GFR = 60-89 mL/min/1 73 square meters)    Stage 3A Moderate CKD (GFR = 45-59 mL/min/1 73 square meters)    Stage 3B Moderate CKD (GFR = 30-44 mL/min/1 73 square meters)    Stage 4 Severe CKD (GFR = 15-29 mL/min/1 73 square meters)    Stage 5 End Stage CKD (GFR <15 mL/min/1 73 square meters)  Note: GFR calculation is accurate only with a steady state creatinine    Troponin I [[id number]]  (Normal) Collected: 03/03/21 2243    Lab Status: Final result Specimen: Blood from Arm, Right Updated: 03/03/21 2330     Troponin I <0 02 ng/mL                  XR chest 1 view portable   ED Interpretation by Coco Goddard MD (03/04 0127)   No acute disease read by me  XR tibia fibula 2 views RIGHT   ED Interpretation by Coco Goddard MD (03/04 0128)   No bony abnormality read by me  XR tibia fibula 2 views LEFT   ED Interpretation by Coco Goddard MD (03/04 0128)   No bony abnormality read by me         VAS lower limb venous duplex study, complete bilateral    (Results Pending)              Procedures  ECG 12 Lead Documentation Only    Date/Time: 3/4/2021 1:42 AM  Performed by: Leeann Kohli MD  Authorized by: Leeann Kohli MD     Indications / Diagnosis:  Leg swelling  ECG reviewed by me, the ED Provider: yes    Patient location:  ED  Previous ECG:     Previous ECG:  Compared to current    Comparison ECG info:  8/12/19    Similarity:  Changes noted (no s  arrhythmia now)  Quality:     Tracing quality:  Limited by artifact  Rate:     ECG rate:  92    ECG rate assessment: normal    Rhythm:     Rhythm: sinus rhythm    Ectopy:     Ectopy: none    QRS:     QRS axis:  Normal    QRS intervals:  Normal  Conduction:     Conduction: normal    ST segments:     ST segments:  Normal  T waves:     T waves: normal               ED Course  ED Course as of Mar 04 0434   Thu Mar 04, 2021   [id number] CMP d/w patient and sister with patient's permission  1083 K-dur po ordered  Potassium(!): 3 3   0232 IV ancef ordered  WBC(!): 11 26   0240 Labs d/w patient and sister  Pain improving  SBIRT 20yo+      Most Recent Value   SBIRT (22 yo +)   In order to provide better care to our patients, we are screening all of our patients for alcohol and drug use  Would it be okay to ask you these screening questions?   Unable to answer at this time Filed at: 03/04/2021 8498            Wells' Criteria for DVT      Most Recent Value   Wells' Criteria for DVT   Active cancer Treatment or palliation within 6 months  0 Filed at: 03/04/2021 1545   Bedridden recently >3 days or major surgery within 12 weeks  0 Filed at: 03/04/2021 0154   Calf swelling >3 cm compared to the other leg  0 Filed at: 03/04/2021 0154   Entire leg swollen  1 Filed at: 03/04/2021 0154   Collateral (nonvaricose) superficial veins present  0 Filed at: 03/04/2021 0154   Localized tenderness along the deep venous system  1 Filed at: 03/04/2021 0154   Pitting edema, confined to symptomatic leg  1 Filed at: 03/04/2021 0154   Paralysis, paresis, or recent plaster immobilization of the lower extremity  0 Filed at: 03/04/2021 0154   Previously documented DVT  0 Filed at: 03/04/2021 0154   Alternative diagnosis to DVT as likely or more likely  0 Filed at: 03/04/2021 0154   Wells DVT Critera Score  3 Filed at: 03/04/2021 0154              [facility]  Number of Diagnoses or Management Options  Diagnosis management comments: DDx including but not limited to: metabolic abnormality, renal failure, thyroid disease, CHF, DVT, cellulitis; doubt fx or dislocation or arterial insufficiency          Amount and/or Complexity of Data Reviewed  Clinical lab tests: ordered and reviewed  Tests in the radiology section of CPT®: ordered and reviewed  Decide to obtain previous medical records or to obtain history from someone other than the patient: yes  Obtain history from someone other than the patient: yes  Review and summarize past medical records: yes  Discuss the patient with other providers: yes  Independent visualization of images, tracings, or specimens: yes        Disposition  Final diagnoses:   Peripheral edema   Ambulatory dysfunction   Bilateral cellulitis of lower leg   Bilateral leg pain     Time reflects when diagnosis was documented in both MDM as applicable and the Disposition within this note     Time User Action Codes Description Comment    3/4/2021  2:18 AM Manuel Diaz Add [R60 9] Peripheral edema     3/4/2021  2:18 AM Manuel Diaz Add [R26 2] Ambulatory dysfunction     3/4/2021  2:31 AM Manuel Diaz Add [L03 116,  L03 115] Bilateral cellulitis of lower leg     3/4/2021  2:42 AM Manuel Diaz Add [I25 474,  M79 605] Bilateral leg pain     3/4/2021  4:10 AM Kasia Barboza Add [R29 898] Weakness of both lower extremities       ED Disposition     ED Disposition Condition Date/Time Comment    Admit Stable Thu Mar 4, 2021  2:51 AM Case was discussed with ERROL PATEL  and the patient's admission status was agreed to be Admission Status: observation status to the service of Dr Sandy Sprague   Follow-up Information    None         Patient's Medications   Discharge Prescriptions    No medications on file     No discharge procedures on file      PDMP Review       Value Time User    PDMP Reviewed  Yes 3/4/2021 12:38 AM Rekha Butler MD          ED Provider  Electronically Signed by           Rekha Butler MD  03/04/21 0288 Rebecca Cardoso MD  03/04/21 4218

## 2021-03-04 NOTE — CONSULTS
Consultation - Neurology   Yesenia Roper 30 y o  female MRN: [id number]  Unit/Bed#: [facility] Encounter: [id number]      Assessment/Plan     * Pain in both lower extremities  Assessment & Plan  61-year-old female with PCOS presents with several days of musculoskeletal tenderness and heaviness/edema of bilateral lower extremities  Neurologic exam reveals mild sensory abnormalities, but with maximum effort, patient can demonstrate full strength and reflexes are WNL  No bowel/bladder incontinence or saddle anesthesia  Suspect musculoskeletal origin of patient's symptoms, although exact etiology unclear  Plan:   - Will order MRI lumbar spine without contrast  - Currently ordered CT chest/abdomen/pelvis and CT lower extremity with contrast   - Serum studies:  · Pending: ESR, CRP, [facility], ANCA, ACE, Lyme, Anti-scleroderman, Centromere antibody, RF  · WNL: CK  - Will defer pain management to primary team  Agree with steroids and muscle relaxer    - PT/OT as tolerated   - Medical management and supportive care per primary team  Correction of any metabolic or infectious disturbances  Recommendations for outpatient neurological follow up have yet to be determined  History of Present Illness     Reason for Consult / Principal Problem:   Weakness of bilateral lower extremities    HPI: Yesenia Roper is a 30 y o  female with hypertension and PCOS,  Who presented to the ED early this morning due to increased pain and swelling of her bilateral lower extremities, worse on the left  The patient reports that she woke up 3 days ago and noted pain in her bilateral lower extremities, worse on the left  Symptoms were progressive in onset throughout the day  Yesterday, patient noted heaviness in her bilateral lower extremities and difficulty moving her feet  She has been experiencing difficulty ambulating due to the symptoms  The pain has been worsening in his present with just touch of the lower extremities    The pain is primarily located on the medial and lateral aspects of both legs  She notes pain in the bilateral calves  Pain is described as achy  She reports her legs feel heavy like she is dragging them through sand  Occasionally she notes a sharp shooting pain either going from her back down to her feet or her feet up towards her back  She denies any bowel or bladder incontinence or saddle anesthesia  She does not describe a pins and needle sensation  She has tried to apply heat and it does not resolve her pain  She denies any new medications recently  She denies any injuries or falls  She has never had symptoms like this before  Per chart review, she was seen at [facility] in July 2020  She was diagnosed with sciatica after reporting radicular pain down her left leg  At that time, she had experienced a mechanical fall which prompted the pain and muscle spasm she was experiencing  On arrival to the ED, patient's vital signs were stable  Lab work revealed mild hypokalemia  CK was normal   High sensitivity CRP was elevated  She demonstrated mild leukocytosis  UA was unremarkable  Venous Dopplers were unremarkable  X-ray of her lumbar spine is pending  She did have a prior x-ray of her lumbar spine in 7/2020, which was unremarkable  She received 1 dose of Dilaudid in the ED  At time of neurologic evaluation, she continues to report symptoms without improvement  She denies any other focal neurologic symptoms other than stated above  She has a family history of autoimmune disease  Her mother was diagnosed with sarcoidosis  She does not know of any family members who have had similar complaints to this  Inpatient consult to Neurology  Consult performed by: FREDDY Fernandes  Consult ordered by: Julian Amor MD          Review of Systems   Cardiovascular: Positive for leg swelling  Gastrointestinal: Positive for nausea     Musculoskeletal: Positive for arthralgias, back pain, gait problem and myalgias  Neurological: Positive for weakness and numbness  Psychiatric/Behavioral: Positive for sleep disturbance  All other systems reviewed and are negative  Historical Information   Past Medical History:   Diagnosis Date    Hypertension     Obesity     PCOS (polycystic ovarian syndrome)      Past Surgical History:   Procedure Laterality Date    NO PAST SURGERIES       Social History   Social History     Substance and Sexual Activity   Alcohol Use No     Social History     Substance and Sexual Activity   Drug Use No     E-Cigarette/Vaping    E-Cigarette Use Never User      E-Cigarette/Vaping Substances    Nicotine No     THC No     CBD No     Flavoring No     Other No     Unknown No      Social History     Tobacco Use   Smoking Status Former Smoker    Types: Cigarettes    Quit date:     Years since quittin 1   Smokeless Tobacco Never Used   Tobacco Comment    patient admits to hx of 1 or 2 cigarettes a year     Family History:   Family History   Problem Relation Age of Onset    Thyroid disease Mother     Sarcoidosis Mother     Hypertension Father     Diabetes Father     Hyperlipidemia Father     Cancer Neg Hx     Stroke Neg Hx        Review of previous medical records was completed       Meds/Allergies   all current active meds have been reviewed, current meds:   Current Facility-Administered Medications   Medication Dose Route Frequency    acetaminophen (TYLENOL) tablet 650 mg  650 mg Oral Q6H PRN    dexamethasone (DECADRON) injection 6 mg  6 mg Intravenous Q6H [address]    enoxaparin (LOVENOX) subcutaneous injection 40 mg  40 mg Subcutaneous BID    metaxalone (SKELAXIN) tablet 800 mg  800 mg Oral TID    ondansetron (ZOFRAN) injection 4 mg  4 mg Intravenous Once    ondansetron (ZOFRAN) injection 4 mg  4 mg Intravenous Q6H PRN    sodium chloride 0 9 % infusion  125 mL/hr Intravenous Continuous    and PTA meds:   Prior to Admission Medications Prescriptions Last Dose Informant Patient Reported? Taking? ALPRAZolam (XANAX) 0 5 mg tablet   No No   Sig: Take 1 tablet (0 5 mg total) by mouth 3 (three) times a day as needed for anxiety or sleep for up to 10 days   Patient not taking: Reported on 9/25/2020   ciclopirox (LOPROX) 0 77 % cream Not Taking at Unknown time Self No No   Sig: Apply topically 2 (two) times a day   Patient not taking: Reported on 9/9/2020   diazepam (VALIUM) 5 mg tablet Not Taking at Unknown time Self No No   Sig: Take 1 tablet (5 mg total) by mouth every 8 (eight) hours as needed for muscle spasms for up to 10 doses   Patient not taking: Reported on 9/9/2020   zolpidem (AMBIEN) 5 mg tablet   No No   Sig: Take 1 tablet (5 mg total) by mouth as needed for sleep (for use during sleep study) for up to 2 days   Patient not taking: Reported on 10/16/2020      Facility-Administered Medications: None       No Known Allergies    Objective   Vitals:Blood pressure 130/58, pulse 98, temperature 98 5 °F (36 9 °C), temperature source Oral, resp  rate 16, height 5' 4" (1 626 m), weight 132 kg (291 lb 0 1 oz), SpO2 100 %, not currently breastfeeding  ,Body mass index is 49 95 kg/m²  Intake/Output Summary (Last 24 hours) at 3/4/2021 1524  Last data filed at 3/4/2021 0534  Gross per 24 hour   Intake 50 ml   Output --   Net 50 ml       Invasive Devices: Invasive Devices     Peripheral Intravenous Line            Peripheral IV 03/04/21 Right Antecubital less than 1 day                Physical Exam  Vitals signs and nursing note reviewed  Constitutional:       Appearance: She is not ill-appearing, toxic-appearing or diaphoretic  Comments: Mild distress regarding pain  HENT:      Head: Normocephalic and atraumatic  Right Ear: External ear normal       Left Ear: External ear normal       Nose: Nose normal       Mouth/Throat:      Mouth: Mucous membranes are moist       Pharynx: Oropharynx is clear     Eyes:      General: No scleral icterus  Right eye: No discharge  Left eye: No discharge  Extraocular Movements: Extraocular movements intact and EOM normal       Conjunctiva/sclera: Conjunctivae normal       Pupils: Pupils are equal, round, and reactive to light  Neck:      Musculoskeletal: Normal range of motion and neck supple  Cardiovascular:      Rate and Rhythm: Normal rate  Pulmonary:      Effort: Pulmonary effort is normal  No respiratory distress  Musculoskeletal:         General: Tenderness present  No signs of injury  Right lower leg: Edema present  Left lower leg: Edema present  Comments: Tender to palpation at lower back and bilateral hips (R>L)   Skin:     General: Skin is warm  Coloration: Skin is not jaundiced or pale  Findings: No bruising  Neurological:      Mental Status: She is alert and oriented to person, place, and time  Sensory: Sensory deficit present  Motor: Weakness present  Coordination: Coordination normal  Finger-Nose-Finger Test normal    Psychiatric:         Mood and Affect: Mood normal          Thought Content: Thought content normal          Judgment: Judgment normal        Neurologic Exam     Mental Status   Oriented to person, place, and time  Level of consciousness: alert  Knowledge: good  Able to name object  Follows all commands without difficulty  No dysarthria or aphasia  Cranial Nerves     CN II   Visual fields full to confrontation  CN III, IV, VI   Pupils are equal, round, and reactive to light  Extraocular motions are normal    Nystagmus: none   Upgaze: normal  Downgaze: normal  Conjugate gaze: present    CN V   Facial sensation intact  CN VII   Facial expression full, symmetric  CN VIII   Hearing: intact    CN XII   Tongue: not atrophic  Fasciculations: absent  Tongue deviation: none    Motor Exam Initially patient able to wiggle toes on command, with increased effort to perform on LLE       On re-evaluation with attending neurologist, patient improved  With maximum effort, patient is able to exhibit full strength despite pain  Sensory Exam   Painful light touch in B/L LE, but equal sensation  Vibration diminished at toes compared to knees bilaterally  Diminished on left compared to right  Temperature intact thoughout  Pin intact  Gait, Coordination, and Reflexes     Gait  Gait: (Deferred due to pain and weakness)    Coordination   Finger to nose coordination: normal    Tremor   Resting tremor: absent  Intention tremor: absent  Action tremor: absent  Reflexes intact and symmetric throughout  Lab Results: I have personally reviewed pertinent reports  Imaging Studies: I have personally reviewed pertinent reports  and I have personally reviewed pertinent films in PACS (X ray lumbar spine)  EKG, Pathology, and Other Studies: I have personally reviewed pertinent reports      VTE Prophylaxis: Enoxaparin (Lovenox)    Code Status: Level 1 - Full Code

## 2021-03-04 NOTE — ASSESSMENT & PLAN NOTE
· Patient scheduled to undergo gastric sleeve in April  · Has struggled to lose weight chief with following low-sodium diet

## 2021-03-04 NOTE — H&P
H&P- Eduin Aurora Jaynatividad 1995, 30 y o  female MRN: [id number]    Unit/Bed#: ED 28 Encounter: [id number]    Primary Care Provider: Lulú Carvalho DO   Date and time admitted to hospital: 3/4/2021 12:33 AM    * Weakness of both lower extremities  Assessment & Plan  · Progressive bilateral lower extremity weakness over the past 3 months  Worsening pain over the past 2 days  · Patient has similar complaint in December  Dopplers unremarkable at that time ordered by OBGYN  · Strength 2/5, sensation present   · Rectal tone intact  · High sensitive CRP elevated 49  · D-dimer negative  · CK unremarkable  · Family history of autoimmune disease  Follow-up [facility]  · Neurology consult        Leg swelling  Assessment & Plan  · Bilateral lower extremity swelling accompanied by pain/weakness  · Patient reports heaviness in her legs  · Pulses intact  · D-dimer negative  · WBC 11, lactic normal 1 4  · Legs are mildly erythematous  · Patient was given antibiotics emergency department- low suspicion for cellulitis      Morbid obesity with BMI of 45 0-49 9, adult [facility])  Assessment & Plan  · Patient scheduled to undergo gastric sleeve in April  · Has struggled to lose weight chief with following low-sodium diet    HTN (hypertension)  Assessment & Plan  · Patient reports being diagnosed with hypertension  · Non-Compliant with medications  · /76 on admission  · Monitor vitals      VTE Prophylaxis: Enoxaparin (Lovenox)  / sequential compression device   Code Status:  Full code  POLST: POLST form is not discussed and not completed at this time  Anticipated Length of Stay:  Patient will be admitted on an Observation basis with an anticipated length of stay of  < 2 midnights     Justification for Hospital Stay:  Bilateral lower extremity weakness    Chief Complaint:   Leg pain and weakness    History of Present Illness:    Ericka Negrete is a 30 y o  female who presents with past medical history of morbid obesity, hypertension and PCOS   Patient reports pain and weakness in both legs worse on the left  Patient has found it increasingly difficult to perform ADLs due to pain and weakness  Patient reports worse pain at the end of the day and in the mornings  Patient pain does improve with activity initially, but by the end of the day legs feel heavy and painful  Patient was seen by [facility] and December and had bilateral Homans sign  At that time she was worked up for DVT with lower extremity Dopplers  Dopplers were negative at that time  Patient reports her weakness and pain have progressed since then  Patient is not on OCPs  Patient denies any recent falls or trauma  Patient denies any new medications, travel, or insect bites  Physical exam shows bilateral lower extremity weakness  Strength is 2/5  Patient has difficulty moving legs against gravity  Unable to move legs against resistance  Rectal tone is present  Patient was given Keflex in the emergency department due to WBC 11k, erythema and swelling  X-rays of both tibias unremarkable  Chest x-ray unremarkable  Patient does report a family history of autoimmune disease  Patient's mother has sarcoidosis  Review of Systems:    Review of Systems   Constitutional: Positive for fatigue and unexpected weight change  Negative for fever  HENT: Negative for congestion, trouble swallowing and voice change  Eyes: Negative for pain and visual disturbance  Respiratory: Negative for cough, shortness of breath and wheezing  Cardiovascular: Negative for chest pain and palpitations  Gastrointestinal: Negative for abdominal pain, blood in stool, constipation, diarrhea, nausea, rectal pain and vomiting  Genitourinary: Negative for dysuria and flank pain  Musculoskeletal: Positive for back pain, gait problem and myalgias  "Legs feel heavy"  Bilateral leg pain worse on the left   Neurological: Positive for weakness and numbness  Negative for dizziness         Past Medical and Surgical History:     Past Medical History:   Diagnosis Date    Hypertension     Obesity     PCOS (polycystic ovarian syndrome)        Past Surgical History:   Procedure Laterality Date    NO PAST SURGERIES         Meds/Allergies:    Prior to Admission medications    Medication Sig Start Date End Date Taking? Authorizing Provider   ALPRAZolam Nichelle Gill) 0 5 mg tablet Take 1 tablet (0 5 mg total) by mouth 3 (three) times a day as needed for anxiety or sleep for up to 10 days  Patient not taking: Reported on 2020  Madeleine Montoya MD   ciclopirox (LOPROX) 0 77 % cream Apply topically 2 (two) times a day  Patient not taking: Reported on 2020   Garcia Hayden MD   diazepam (VALIUM) 5 mg tablet Take 1 tablet (5 mg total) by mouth every 8 (eight) hours as needed for muscle spasms for up to 10 doses  Patient not taking: Reported on 2020   Garcia Hayden MD   zolpidem (AMBIEN) 5 mg tablet Take 1 tablet (5 mg total) by mouth as needed for sleep (for use during sleep study) for up to 2 days  Patient not taking: Reported on 10/16/2020 9/25/20 9/27/20  Hyun Lozada MD     I have reviewed home medications with patient personally      Allergies: No Known Allergies    Social History:     Marital Status: /Civil Union   Occupation:  Works from home  Patient Pre-hospital Living Situation:  Home  Patient Pre-hospital Level of Mobility:  Able to ambulate but has become more difficult recently  Patient Pre-hospital Diet Restrictions:  Low-sodium diet-plans undergo gastric sleeve 2021  Substance Use History:   Social History     Substance and Sexual Activity   Alcohol Use No     Social History     Tobacco Use   Smoking Status Former Smoker    Types: Cigarettes    Quit date:     Years since quittin 1   Smokeless Tobacco Never Used   Tobacco Comment    patient admits to hx of 1 or 2 cigarettes a year     Social History     Substance and Sexual Activity   Drug Use No       Family History:    Family History   Problem Relation Age of Onset    Thyroid disease Mother     Hypertension Father     Diabetes Father     Hyperlipidemia Father     Cancer Neg Hx     Stroke Neg Hx        Physical Exam:     Vitals:   Blood Pressure: 118/73 (03/04/21 0138)  Pulse: 96 (03/04/21 0245)  Temperature: 98 5 °F (36 9 °C) (03/03/21 2233)  Temp Source: Oral (03/03/21 2233)  Respirations: 18 (03/04/21 0245)  Height: 5' 4" (162 6 cm) (03/04/21 0245)  Weight - Scale: 132 kg (291 lb 0 1 oz) (03/04/21 0245)  SpO2: 97 % (03/04/21 0245)    Physical Exam  Vitals signs and nursing note reviewed  Constitutional:       Appearance: She is obese  HENT:      Head: Normocephalic and atraumatic  Nose: Nose normal       Mouth/Throat:      Mouth: Mucous membranes are moist    Eyes:      Extraocular Movements: Extraocular movements intact  Pupils: Pupils are equal, round, and reactive to light  Neck:      Musculoskeletal: Normal range of motion  Cardiovascular:      Rate and Rhythm: Normal rate and regular rhythm  Pulses: Normal pulses  Pulmonary:      Effort: Pulmonary effort is normal       Breath sounds: Normal breath sounds  Abdominal:      General: Bowel sounds are normal    Musculoskeletal:         General: Swelling and tenderness present  Right lower leg: Edema present  Left lower leg: Edema present  Skin:     General: Skin is warm  Findings: Erythema present  No rash  Neurological:      Mental Status: She is alert and oriented to person, place, and time  GCS: GCS eye subscore is 4  GCS verbal subscore is 5  GCS motor subscore is 6  Sensory: Sensation is intact  Motor: Weakness present  Deep Tendon Reflexes: Reflexes abnormal  Babinski sign absent on the right side  Babinski sign absent on the left side  Reflex Scores:       Patellar reflexes are 1+ on the right side and 1+ on the left side         Achilles reflexes are 1+ on the right side and 1+ on the left side  Comments: Strength 2/5 bilateral lower extremity   Psychiatric:         Mood and Affect: Mood normal          Behavior: Behavior normal       Comments: Anxious           Additional Data:     Lab Results: I have personally reviewed pertinent reports  Results from last 7 days   Lab Units 03/04/21  0128   WBC Thousand/uL 11 26*   HEMOGLOBIN g/dL 12 9   HEMATOCRIT % 39 9   PLATELETS Thousands/uL 324   NEUTROS PCT % 56   LYMPHS PCT % 30   MONOS PCT % 9   EOS PCT % 4     Results from last 7 days   Lab Units 03/03/21  2244   POTASSIUM mmol/L 3 3*   CHLORIDE mmol/L 101   CO2 mmol/L 26   BUN mg/dL 13   CREATININE mg/dL 0 79   CALCIUM mg/dL 8 8   ALK PHOS U/L 95   ALT U/L 46   AST U/L 21     Results from last 7 days   Lab Units 03/04/21  0128   INR  1 01       Imaging: I have personally reviewed pertinent reports  No results found  EKG, Pathology, and Other Studies Reviewed on Admission:   · EKG:  Pending read    [facility] Everywhere Records Reviewed: Yes     ** Please Note: This note has been constructed using a voice recognition system   **

## 2021-03-04 NOTE — ED NOTES
Patient aware that urine sample is needed to start antibiotics  Unable to provide at this time        Rosa M Singh RN  03/04/21 3600

## 2021-03-04 NOTE — LETTER
[facility] FLOOR MED SURG UNIT  [address]  [address]  [facility] [id number]  Dept: [phone]    March 6, 2021     Patient: Thomas Chatterjee   YOB: 1995   Date of Visit: 3/3/2021       To Whom it May Concern:    Og Diamond is under my professional care  She was seen in the hospital from 3/3/2021   to 03/06/21  She may return to work on 3/8/21  She may require limited capacity  If you have any questions or concerns, please don't hesitate to call           Sincerely,          Malathi Pena MD

## 2021-03-04 NOTE — ASSESSMENT & PLAN NOTE
· Patient reports being diagnosed with hypertension  · Non-Compliant with medications  · /76 on admission  · Monitor vitals

## 2021-03-04 NOTE — ASSESSMENT & PLAN NOTE
· Bilateral lower extremity swelling accompanied by pain/weakness  · Patient reports heaviness in her legs  · Pulses intact  · D-dimer negative  · WBC 11, lactic normal 1 4  · Legs are mildly erythematous  · Patient was given antibiotics emergency department- low suspicion for cellulitis

## 2021-03-04 NOTE — ASSESSMENT & PLAN NOTE
· Progressive bilateral lower extremity weakness over the past 3 months  Worsening pain over the past 2 days  · Patient has similar complaint in December  Dopplers unremarkable at that time ordered by OBGYN  · Strength 2/5, sensation present   · Rectal tone intact  · High sensitive CRP elevated 49  · D-dimer negative  · CK unremarkable  · Family history of autoimmune disease    Follow-up [facility]  · Neurology consult

## 2021-03-05 LAB
ALBUMIN SERPL BCP-MCNC: 3 G/DL (ref 3.5–5)
ALP SERPL-CCNC: 78 U/L (ref 46–116)
ALT SERPL W P-5'-P-CCNC: 34 U/L (ref 12–78)
ANION GAP SERPL CALCULATED.3IONS-SCNC: 10 MMOL/L (ref 4–13)
AST SERPL W P-5'-P-CCNC: 14 U/L (ref 5–45)
BILIRUB SERPL-MCNC: 0.32 MG/DL (ref 0.2–1)
BUN SERPL-MCNC: 11 MG/DL (ref 5–25)
C3 SERPL-MCNC: 144 MG/DL (ref 90–180)
C4 SERPL-MCNC: 35 MG/DL (ref 10–40)
CALCIUM ALBUM COR SERPL-MCNC: 9.6 MG/DL (ref 8.3–10.1)
CALCIUM SERPL-MCNC: 8.8 MG/DL (ref 8.3–10.1)
CHLORIDE SERPL-SCNC: 103 MMOL/L (ref 100–108)
CO2 SERPL-SCNC: 22 MMOL/L (ref 21–32)
CREAT SERPL-MCNC: 0.67 MG/DL (ref 0.6–1.3)
CRP SERPL QL: 24.7 MG/L
ERYTHROCYTE [SEDIMENTATION RATE] IN BLOOD: 60 MM/HOUR (ref 0–19)
GFR SERPL CREATININE-BSD FRML MDRD: 121 ML/MIN/1.73SQ M
GLUCOSE P FAST SERPL-MCNC: 128 MG/DL (ref 65–99)
GLUCOSE SERPL-MCNC: 128 MG/DL (ref 65–140)
POTASSIUM SERPL-SCNC: 4.1 MMOL/L (ref 3.5–5.3)
PROT SERPL-MCNC: 7.7 G/DL (ref 6.4–8.2)
RYE IGE QN: NEGATIVE
SODIUM SERPL-SCNC: 135 MMOL/L (ref 136–145)

## 2021-03-05 PROCEDURE — 99225 PR SBSQ OBSERVATION CARE/DAY 25 MINUTES: CPT | Performed by: INTERNAL MEDICINE

## 2021-03-05 PROCEDURE — 80053 COMPREHEN METABOLIC PANEL: CPT | Performed by: INTERNAL MEDICINE

## 2021-03-05 PROCEDURE — 99214 OFFICE O/P EST MOD 30 MIN: CPT | Performed by: NURSE PRACTITIONER

## 2021-03-05 PROCEDURE — 86255 FLUORESCENT ANTIBODY SCREEN: CPT | Performed by: INTERNAL MEDICINE

## 2021-03-05 PROCEDURE — 86618 LYME DISEASE ANTIBODY: CPT | Performed by: INTERNAL MEDICINE

## 2021-03-05 PROCEDURE — 86160 COMPLEMENT ANTIGEN: CPT | Performed by: INTERNAL MEDICINE

## 2021-03-05 PROCEDURE — 97163 PT EVAL HIGH COMPLEX 45 MIN: CPT

## 2021-03-05 PROCEDURE — 85652 RBC SED RATE AUTOMATED: CPT | Performed by: PHYSICIAN ASSISTANT

## 2021-03-05 PROCEDURE — 86430 RHEUMATOID FACTOR TEST QUAL: CPT | Performed by: INTERNAL MEDICINE

## 2021-03-05 PROCEDURE — 86140 C-REACTIVE PROTEIN: CPT | Performed by: INTERNAL MEDICINE

## 2021-03-05 PROCEDURE — 82164 ANGIOTENSIN I ENZYME TEST: CPT | Performed by: INTERNAL MEDICINE

## 2021-03-05 PROCEDURE — 86235 NUCLEAR ANTIGEN ANTIBODY: CPT | Performed by: INTERNAL MEDICINE

## 2021-03-05 RX ORDER — GABAPENTIN 300 MG/1
300 CAPSULE ORAL
Status: DISCONTINUED | OUTPATIENT
Start: 2021-03-05 | End: 2021-03-06 | Stop reason: HOSPADM

## 2021-03-05 RX ORDER — METAXALONE 800 MG/1
800 TABLET ORAL EVERY 6 HOURS PRN
Status: DISCONTINUED | OUTPATIENT
Start: 2021-03-05 | End: 2021-03-06 | Stop reason: HOSPADM

## 2021-03-05 RX ADMIN — DEXAMETHASONE SODIUM PHOSPHATE 6 MG: 4 INJECTION, SOLUTION INTRA-ARTICULAR; INTRALESIONAL; INTRAMUSCULAR; INTRAVENOUS; SOFT TISSUE at 23:51

## 2021-03-05 RX ADMIN — SODIUM CHLORIDE 125 ML/HR: 0.9 INJECTION, SOLUTION INTRAVENOUS at 23:51

## 2021-03-05 RX ADMIN — METAXALONE 800 MG: 800 TABLET ORAL at 21:47

## 2021-03-05 RX ADMIN — DEXAMETHASONE SODIUM PHOSPHATE 4 MG: 4 INJECTION, SOLUTION INTRA-ARTICULAR; INTRALESIONAL; INTRAMUSCULAR; INTRAVENOUS; SOFT TISSUE at 15:00

## 2021-03-05 RX ADMIN — METAXALONE 800 MG: 800 TABLET ORAL at 09:15

## 2021-03-05 RX ADMIN — ACETAMINOPHEN 650 MG: 325 TABLET, FILM COATED ORAL at 20:45

## 2021-03-05 RX ADMIN — SODIUM CHLORIDE 125 ML/HR: 0.9 INJECTION, SOLUTION INTRAVENOUS at 06:02

## 2021-03-05 RX ADMIN — METAXALONE 800 MG: 800 TABLET ORAL at 16:50

## 2021-03-05 RX ADMIN — DEXAMETHASONE SODIUM PHOSPHATE 6 MG: 4 INJECTION, SOLUTION INTRA-ARTICULAR; INTRALESIONAL; INTRAMUSCULAR; INTRAVENOUS; SOFT TISSUE at 06:09

## 2021-03-05 RX ADMIN — ACETAMINOPHEN 650 MG: 325 TABLET, FILM COATED ORAL at 06:13

## 2021-03-05 RX ADMIN — ENOXAPARIN SODIUM 40 MG: 40 INJECTION SUBCUTANEOUS at 18:45

## 2021-03-05 RX ADMIN — DEXAMETHASONE SODIUM PHOSPHATE 4 MG: 4 INJECTION, SOLUTION INTRA-ARTICULAR; INTRALESIONAL; INTRAMUSCULAR; INTRAVENOUS; SOFT TISSUE at 18:44

## 2021-03-05 RX ADMIN — ENOXAPARIN SODIUM 40 MG: 40 INJECTION SUBCUTANEOUS at 09:15

## 2021-03-05 RX ADMIN — GABAPENTIN 300 MG: 300 CAPSULE ORAL at 23:50

## 2021-03-05 NOTE — PROGRESS NOTES
Neurology Progress Note - Maicol Benitez 1995, 30 y o  female   MRN: [id number] Unit/Bed#: S -01 Encounter: [id number]        * Pain in both lower extremities  Assessment & Plan  78-year-old female with PCOS and morbid obesity who presented yesterday with several days of musculoskeletal tenderness and heaviness/edema of bilateral lower extremities  Today as well on exam reveals mild sensory abnormalities, but with maximum effort, patient can demonstrate full strength and reflexes are WNL  She continues to report severe pain particularly on the left lower extremity proximally more so than the right with any degree of palpitation or manipulation  Her MRI of L-spine done last night although with some degree of disc bulging is mildly noncompressive at L5-S1  Radiology notes a prominent epidural fat at L5-S1 results in developmental tapering of the thecal sac  Her CK was normal   Her inflammatory markers including her sed rate, markedly elevated at 61 for a woman of her age at 30 are clearly positive  At this point her inflammatory markers and her nearly unremarkable MRI plan to an inflammatory condition possibly polymyalgia rheumatica  We have discussed this with the patient and her mother present at this time  - Serum studies Pending: CRP, MERCED, ANCA, ACE, Lyme, Anti-scleroderman, Centromere antibody, RF  - transition her Decadron steroids to p o  And maintain and muscle relaxer    - PT/OT as tolerated   - Medical management and supportive care per primary team  Correction of any metabolic or infectious disturbances  This patient needs outpatient follow-up with Neurology in a month or so  She is scheduled to have a gastric bypass in April  There is no need for her to see us emergently before her gastric bypass based on her current exam     Subjective/Objective     Subjective:  0h it hurts so much when you just touched them, (referencing her legs)      ROS:  The patient continues report marked S/P FALL AT 12 NOON complaints of pain with any manipulation or maneuvering of her bilateral lower extremities  She reports that she has had some improvement overnight with both the Decadron as well as the muscle relaxer she reports the left leg continues to hurt markedly more than the right  The remainder of her query is largely negative  Medication Dose Route Frequency    acetaminophen  650 mg Oral Q6H PRN    dexamethasone  6 mg Intravenous Q6H [address]    enoxaparin  40 mg Subcutaneous BID    metaxalone  800 mg Oral TID    ondansetron  4 mg Intravenous Once    ondansetron  4 mg Intravenous Q6H PRN    sodium chloride  125 mL/hr Intravenous Continuous       acetaminophen    ondansetron    Vitals: Blood pressure 157/71, pulse 104, temperature 97 9 °F (36 6 °C), temperature source Oral, resp  rate 18, height 5' 4" (1 626 m), weight 129 kg (284 lb), SpO2 97 %, not currently breastfeeding  ,Body mass index is 48 75 kg/m²  Physical Exam:     Pal Cerrato seen in:  In bed, her mother is in the room, we examined her and asked her to move which she was able to do with assistance to the side at the edge of the bed  General appearance: alert,   Neck, Lungs, Heart, & abdomen: WNL  Extremities: atraumatic, no cyanosis or edema    Neurologic:   Mental status: Alert, oriented, thought content appropriate, cognitively intact  CN: exam EOM's I, Gaze conjugate  Non lateralizing sensory & motor exam, (PP not tested on face)  Reminder CNVIII-XII normal    Motor: full power age appropriate x upper limbs, again with coaching and best effort she is able to demonstrate full power without lateralizing features of her bilateral lower extremities  She has clear complaints of pain with extreme movement such as these  Sensory: grossly intact  X 4 limbs, PP tested symmetrical in painful  Cerebellar: no ataxia or past pointing w pronation from a modified Romberg position     Gait:  We did not gait her she was able to largely change positions despite complaints of pain to the edge of the bed independently  She did require a handhold x2 to stand at the edge of the bed  She reports complaints of pain  DTRs and planters deferred on today's exam         Lab Results:   I have personally reviewed pertinent reports  , CBC:   Results from last 7 days   Lab Units 03/04/21  0628 03/04/21  0128   WBC Thousand/uL 9 66 11 26*   RBC Million/uL 4 64 4 93   HEMOGLOBIN g/dL 12 5 12 9   HEMATOCRIT % 39 0 39 9   MCV fL 84 81*   PLATELETS Thousands/uL 303  303 324   , BMP/CMP:   Results from last 7 days   Lab Units 03/05/21  0730 03/04/21  0628 03/03/21  2244   SODIUM mmol/L 135* 136 135*   POTASSIUM mmol/L 4 1 3 8 3 3*   CHLORIDE mmol/L 103 102 101   CO2 mmol/L 22 26 26   BUN mg/dL 11 11 13   CREATININE mg/dL 0 67 0 72 0 79   CALCIUM mg/dL 8 8 8 7 8 8   AST U/L 14  --  21   ALT U/L 34  --  46   ALK PHOS U/L 78  --  95   EGFR ml/min/1 73sq m 121 115 103   , Vitamin B12:   , HgBA1C:   , TSH:   Results from last 7 days   Lab Units 03/03/21  2244   TSH 3RD GENERATON uIU/mL 2 998   , Coagulation:   Results from last 7 days   Lab Units 03/04/21  0128   INR  1 01   , Lipid Profile:   , her sed rate is 60     Imaging Studies: I have personally reviewed pertinent films in PACS and Her MRI of the lumbar spine by radiology's report and somewhat by my review demonstrates an L5-S1 minimal or mild disc compression  No acute pathology  Counseling / Coordination of Care  Total time spent today Greater than 25 minutes  Greater than 50% of total time was spent with the patient and / or family counseling and / or coordination of care  A description of the counseling / coordination of care: All of the above was discussed and reviewed with the patient the bedside  The patient as well as her mother both had several questions I believe they were all addressed to their satisfaction at this time

## 2021-03-05 NOTE — PROGRESS NOTES
[facility] Progress Note  Patient: Ericka Huffman 30 y o  female   MRN: [id number]  PCP: Zully Obrien DO  Unit/Bed#: S -01 Encounter: [id number]  Date Of Visit: 03/05/21    Assessment:    Principal Problem:    Pain in both lower extremities  Active Problems:    HTN (hypertension)    Morbid obesity with BMI of 45 0-49 9, adult (HCC)    Leg swelling      Plan:    · Bilateral Lower Extremity Weakness / Edema / Pain -   · Cause - uncertain but there is a lot of testing still pending which will serve as a starting point for what will be mostly an outpatient workup for this patient  Some extensive workup done here is detailed below  Urology currently suggesting somewhat of a polymyalgia rheumatica type condition although this condition is typically almost exclusively in people over 48years of age  · Evaluations -   · Pertinent testing results -   · MRI lumbar spine shows no significant disc / compressive disease to explain patient's symptoms  · No abnormalities of muscles / structures evaluated in the left lower extremity CT  · Albumin 3 4 on admission  No proteinuria on urinalysis  LFTs within normal limits  · Venous doppler negative  D-dimer normal   · TSH within normal limits  · NT-proBNP normal at 6  · CT chest abdomen pelvis has no acute pathology  Only incidental thyroid nodule and evidence of known chronic PCOS  · Additional tests / pending tests -   · Follow up MERCED, Complements, Rheurmatoid factor, ESR, CRP, ANCA, ACE, Anti-Scl, Anticentromere, Anti-CCP  We have gone down this path of testing due to reported family history of psoriasis, sarcoidosis, Graves disease  · PT Evaluation today - follow up PT recommendations  · Would benefit from outpatient rheumatologist evaluation  · Treatments -   · Trial of Dexamethasone and muscle relaxer (skelaxin) currently, as recommended by neurology team   Will continue for today but change the skelaxin to PRN   Likely tomorrow, can start to taper steroid down to oral steroid  · Can trial neurontin for pain symptoms  Start at low dose  · Incidental Finding: Thyroid Nodule -   · TSH normal   Can order free T4 and T3 in am     · Outpatient thyroid ultrasound  Noted is family history of Graves Disease  · Morbid Obesity - weight loss encouraged  Patient will have outpatient follow-up and is apparently scheduled undergo gastric sleeve in April of this year  VTE Pharmacologic Prophylaxis:   Pharmacologic: Enoxaparin (Lovenox)  Mechanical VTE Prophylaxis in Place: Yes    Patient Centered Rounds: I have performed bedside rounds with nursing staff today  Discussions with Specialists or Other Care Team Provider: Neurology later yesterday, but none yet today  Education and Discussions with Family / Patient: Mother updated at bedside during rounds  Time Spent for Care: 30 minutes  More than 50% of total time spent on counseling and coordination of care as described above  Current Length of Stay: 0 day(s)    Current Patient Status: Observation   Certification Statement: The patient, admitted on an observation basis, will now require > 2 midnight hospital stay due to evaluation and treament for weakness as noted above  Discharge Plan / Estimated Discharge Date: to be determined  Code Status: Level 1 - Full Code      Subjective: The patient is not having any numbness or paresthesias  While she is able to stand, she seems to protect the left leg due to pain symptoms  The patient describes focal areas of pain on the inner calf as well as a small area on the outer calf without any obvious overlying lesions  The 3 erythematous circular lesions seen yesterday on exam are no longer present on the left leg today  The patient has stable edema without any significant improvement but also no worsening  No new rashes identified  The patient has had no fevers overnight    The patient again describes the pain that she is having as being more of like a bruise type of pain as opposed to a sharp pain, stabbing pain, aching pain, or burning pain  The patient was able to walk with Physical therapy today but did require a walker  Physical therapy did recommend home PT versus outpatient PT and, as the patient does not wish to be homebound, we would look to do outpatient physical therapy after discharge  I discussed all the testing results that we have thus far at the time of rounds this afternoon and also the recommendation for outpatient thyroid ultrasound and follow-up with a rheumatologist   These are 2 things that we are unable to do in the hospital for her  We did discuss also the potential for discharge tomorrow  Objective:     Vitals:   Temp (24hrs), Av °F (36 7 °C), Min:97 7 °F (36 5 °C), Max:98 2 °F (36 8 °C)    Temp:  [97 7 °F (36 5 °C)-98 2 °F (36 8 °C)] 97 7 °F (36 5 °C)  HR:  [] 89  Resp:  [16] 16  BP: (111-145)/(56-78) 128/78  SpO2:  [95 %-100 %] 95 %  Body mass index is 48 75 kg/m²  Input and Output Summary (last 24 hours): Intake/Output Summary (Last 24 hours) at 3/5/2021 0843  Last data filed at 3/5/2021 0602  Gross per 24 hour   Intake 2918 75 ml   Output --   Net 2918 75 ml       Physical Exam:     Physical Exam  Vitals signs reviewed  Constitutional:       General: She is not in acute distress  HENT:      Mouth/Throat:      Mouth: Mucous membranes are moist       Pharynx: Oropharynx is clear  Eyes:      Pupils: Pupils are equal, round, and reactive to light  Neck:      Musculoskeletal: Neck supple  Cardiovascular:      Rate and Rhythm: Normal rate and regular rhythm  Pulses: Normal pulses  Heart sounds: No murmur  Pulmonary:      Effort: Pulmonary effort is normal  No respiratory distress  Breath sounds: No wheezing, rhonchi or rales  Abdominal:      General: Bowel sounds are normal  There is no distension  Palpations: Abdomen is soft  Tenderness:  There is no abdominal tenderness  Comments: Obese   Musculoskeletal:         General: Swelling and tenderness (Mainly in focal areas as noted in the subjective section above) present  No deformity  Lymphadenopathy:      Cervical: No cervical adenopathy  Skin:     General: Skin is warm and dry  Findings: No erythema or rash  Neurological:      General: No focal deficit present  Mental Status: She is alert and oriented to person, place, and time  Sensory: No sensory deficit  Motor: No weakness ( limited mobility in the left leg is due to pain as opposed to true weakness currently)  Additional Data:     Labs:    Results from last 7 days   Lab Units 03/04/21  0628   WBC Thousand/uL 9 66   HEMOGLOBIN g/dL 12 5   HEMATOCRIT % 39 0   PLATELETS Thousands/uL 303  303   NEUTROS PCT % 58   LYMPHS PCT % 30   MONOS PCT % 7   EOS PCT % 4     Results from last 7 days   Lab Units 03/04/21  0628 03/03/21  2244   POTASSIUM mmol/L 3 8 3 3*   CHLORIDE mmol/L 102 101   CO2 mmol/L 26 26   BUN mg/dL 11 13   CREATININE mg/dL 0 72 0 79   CALCIUM mg/dL 8 7 8 8   ALK PHOS U/L  --  95   ALT U/L  --  46   AST U/L  --  21     Results from last 7 days   Lab Units 03/04/21  0128   INR  1 01       * I Have Reviewed All Lab Data Listed Above  * Additional Pertinent Lab Tests Reviewed: All Labs Within Last 24 Hours Reviewed    Imaging:    Imaging Reports Reviewed Today Include: MRI lumbar, CT LLE reviewed  CT abdomen / pelvis results pending  Imaging Personally Reviewed by Myself Includes:  None    Recent Cultures (last 7 days):     Results from last 7 days   Lab Units 03/04/21  0128   BLOOD CULTURE  No Growth at 24 hrs  No Growth at 24 hrs         Last 24 Hours Medication List:   Current Facility-Administered Medications   Medication Dose Route Frequency Provider Last Rate    acetaminophen  650 mg Oral Q6H PRN Jocy Geiger MD      dexamethasone  6 mg Intravenous Q6H Ryan 30, DO      enoxaparin  40 mg Subcutaneous BID Adore Mckinley MD      metaxalone  800 mg Oral TID Ruthy Velásquez DO      ondansetron  4 mg Intravenous Once Adore Mckinley MD      ondansetron  4 mg Intravenous Q6H PRN Adore Mckinley MD      sodium chloride  125 mL/hr Intravenous Continuous Adore Mckinley  mL/hr (03/05/21 0602)        Today, Patient Was Seen By: Ruthy Velásquez DO    ** Please Note: This note has been constructed using a voice recognition system   **

## 2021-03-05 NOTE — PHYSICAL THERAPY NOTE
PHYSICAL THERAPY EVALUATION NOTE    Patient Name: Claven Hodgkin  UDBLC'T Date: 3/5/2021     AGE:   30 y o  Mrn:   [id number]  ADMIT DX:  Peripheral edema [R60 9]  Leg swelling [M79 89]  Bilateral leg pain [M79 604, M79 605]  Bilateral cellulitis of lower leg [L03 116, N70 529]  Ambulatory dysfunction [R26 2]  Weakness of both lower extremities [R29 898]    Past Medical History:   Diagnosis Date    Hypertension     Obesity     PCOS (polycystic ovarian syndrome)      Length Of Stay: 0  PHYSICAL THERAPY EVALUATION :   Patient's identity confirmed via 2 patient identifiers (full name and ) at start of session       21 1524   PT Last Visit   PT Visit Date 21   Note Type   Note type Evaluation   Pain Assessment   Pain Assessment Tool 0-10   Pain Score No Pain  (at rest, up to 7 w/ WB)   Pain Location/Orientation Orientation: Left; Location: Leg   Home Living   Type of [address] [place] [address] Two level;Bed/bath upstairs  ([address])   Bathroom Shower/Tub Tub/shower unit   Home Equipment   (No DME PTA)   Prior Function   Level of [place] Independent with ADLs and functional mobility   Lives With Spouse   ADL Assistance Independent   IADLs Independent   Falls in the last 6 months 0   Vocational Full time employment   Comments Pt resides w/ her   She reports working in [place], admission at [facility], and a , so she is frequently getting and moving around a lot during the work day  Restrictions/Precautions   Weight Bearing Precautions Per Order No   Other Precautions Multiple lines; Fall Risk;Pain   General   Additional Pertinent History Pt reports hx of LBP  Pt reports this onset differs and starts at the posterior calf  Noted edema of B ankles and feet  Pt denies numbness or tingling at this time, reports discomfort is mostly at the calf (dopplers negative)      Family/Caregiver Present Yes  (Mom at bedside) Cognition   Overall Cognitive Status WFL   Arousal/Participation Alert   Orientation Level Oriented X4   Memory Within functional limits   Following Commands Follows multistep commands without difficulty   Comments Pt is pleasant and agreeable to participate in PT evaluation  Reports the walker has been making ambulation easier   RUE Assessment   RUE Assessment WFL   LUE Assessment   LUE Assessment WFL   Strength RLE   R Hip Flexion 4-/5   R Knee Extension 4-/5   R Ankle Dorsiflexion 4-/5   Strength LLE   L Hip Flexion 4-/5   L Knee Extension 3+/5   L Ankle Dorsiflexion 3+/5   Coordination   Sensation X   Light Touch   RLE Light Touch   (Notable edema, especially around B malleoli)   LLE Light Touch   (Notable edema, especially around B malleoli)   Bed Mobility   Supine to Sit Unable to assess   Sit to Supine Unable to assess   Additional Comments Pt seated OOB on couch upon arrival and there upon exit   Transfers   Sit to Stand 5  Supervision   Additional items Assist x 1;Verbal cues   Stand to Sit 5  Supervision   Additional items Assist x 1   Additional Comments Pt w/ decreased L stance time, L step length, and increased use of B UEs to compensate when in L stance time of gait cycle  Pt reports increase in pain w/ full weight bearing  Denies knee buckling, sensation changes, just an increase in pain w/ increase in WB   Ambulation/Elevation   Gait pattern Improper Weight shift;Decreased foot clearance;Decreased L stance; Short stride   Gait Assistance 5  Supervision   Additional items Assist x 1   Assistive Device Rolling walker   Distance 60 ft   Stair Management Assistance 5  Supervision   Additional items Assist x 1;Verbal cues; Increased time required   Stair Management Technique Step to pattern; One rail L   Number of Stairs 4   Balance   Static Sitting Normal   Static Standing Fair +   Ambulatory Fair -   Activity Tolerance   Activity Tolerance Patient limited by pain   Assessment   Prognosis Good   Problem List Decreased strength;Decreased range of motion; Impaired balance;Decreased mobility; Impaired sensation;Obesity   Assessment Lucia Galloway is a 30 y o  Female who [facility] on [facility]4/2021 from home w/ c/o leg swelling and difficulty ambulating and diagnosis of pain in BLEs  Orders for PT eval and treat received, w/ activity orders of ambulate patient and fall precautions  Pt presents w/ comorbidities of PCOS, hypothyroidism, sarcoidosis, morbid obesity and personal factors including: living in 2 story house and inability to navigate level surfaces w/o external assistance  At baseline, pt mobilizes independently without restriction, and reports 0 falls in the last 6 months  Upon evaluation, pt presents w/ the following deficits: weakness, decreased ROM, altered sensation, impaired balance and pain limiting functional mobility  Pt requires S for transfers, and S for gait w/ RW  Pt's clinical presentation is unstable/unpredictable due to poor blood pressure control, need for assist w/ all phases of mobility when usually mobilizing independently, pain impacting overall mobility status, need for input for mobility technique/safety and recent drastic decline in mobility compared to baseline  Given the above findings, discharge recommendation is for outpatient PT and home w/ family support pending medical optimization and mobility progress  During this admission, pt would benefit from skilled acute inpatient PT in order to address the abovementioned deficits to maximize function and mobility before DC from acute care  Goals   Patient Goals to go home   STG Expiration Date 03/15/21   Short Term Goal #1 Patient will:  Increase bilateral LE strength 1/2 grade to facilitate independent mobility, Perform all bed mobility tasks independently to decrease fall risk factors, Perform all transfers modified independent to improve independence, Ambulate at least 150 ft  +/- LRAD modified independent w/o LOB, Navigate a full flight of stairs modified independent with unilateral handrail to facilitate return to previous living environment, Increase all balance 1/2 grade to decrease risk for falls and Improve Barthel Index score to 100 or greater to facilitate independence   PT Treatment Day 0   Plan   Treatment/Interventions Functional transfer training;LE strengthening/ROM; Elevations; Therapeutic exercise; Endurance training;Patient/family training;Equipment eval/education; Bed mobility;Gait training   PT Frequency Other (Comment)  (3-5x/wk)   Recommendation   PT Discharge Recommendation Return to previous environment with social support;Home with skilled therapy  (OPPT pending progress (vs HHPT))   Equipment Recommended [facility] Recommended Wheeled walker  (Wide RW)   Change/add to [facility] Health?  No   AM-PAC Basic Mobility Inpatient   Turning in Bed Without Bedrails 4   Lying on Back to Sitting on Edge of Flat Bed 4   Moving Bed to Chair 3   Standing Up From Chair 3   Walk in Room 3   Climb 3-5 Stairs 3   Basic Mobility Inpatient Raw Score 20   Basic Mobility Standardized Score 43 99   Barthel Index   Feeding 10   Bathing 5   Grooming Score 5   Dressing Score 10   Bladder Score 10   Bowels Score 10   Toilet Use Score 10   Transfers (Bed/Chair) Score 10   Mobility (Level Surface) Score 0   Stairs Score 5   Barthel Index Score 75       Pt would benefit from skilled inpatient PT during this admission in order to facilitate progress towards goals to maximize functional independence    Priscilla Smith, PT, DPT

## 2021-03-05 NOTE — UTILIZATION REVIEW
Initial Clinical Review    Admission: Date/Time/Statement:   Admission Orders (From admission, onward)     Ordered        03/04/21 0251  Place in Observation  Once                   Orders Placed This Encounter   Procedures    Place in Observation     Standing Status:   Standing     Number of Occurrences:   1     Order Specific Question:   Level of Care     Answer:   Med Surg [16]     ED Arrival Information     Expected Arrival Acuity Means of Arrival Escorted By Service Admission Type    - 3/3/2021 22:16 Urgent Walk-In Spouse General Medicine Urgent    Arrival Complaint    LEG PAIN        Chief Complaint   Patient presents with    Leg Swelling     swelling and warm to touch bilateral legs getting increasing worse, today cannot walk      Assessment/Plan: this is a 30year old female from home to ED admitted to observation due to lower extremity weakness/edema/pain  Presented due to increased pain and swelling bilateral lower extremities, worse on left starting about 2 days prior to arrival    Has left foot and toe numbness, using walker to ambulate  Pain on left side near hip down to left calf  On exam tachycardic  Tenderness bilateral LE, large edema bilaterally  localized tiny erythematous lesion of a few mm in diameter which were all right near each other on the left leg  The patient has motor strength that is approximately 3/5 by laterally and possibly slightly worse on the left lower extremity  Moves legs sluggishly  Wbc 11 26  Blood cultures done  Na 135  K 3 3  Multiple imaging without acute findings  In the ED given KCL, Dilaudid, Zofran, IV antibiotics and IVF started  Steroids started  Neurology consulted and recommends trial of steroid and muscle relaxant  Continue IVF  Family with history of autoimmune disease and MERCED, ESR, CRP, MERCED, ANCA, ACE, anti scleroderma, anticentromere antibody, and rheumatoid factor  Ordered   CT of the abdomen and pelvis and also include the chest given the history of sarcoidosis in the family     3/4/2021 per neurology - patient with bilateral leg pain and differential includes: pelvic mass/lumbar radiculopathy/amyotrophy,  the patient not known to be diabetic and other possible etiologies for myositis should be ruled out  start steroids and muscle relaxant, PT, MRI lumbar spine, ct chest/abdomen/pelvis  Pending: ESR, CRP, MERCED, ANCA, ACE, Lyme, Anti-scleroderman, Centromere antibody, RF    3/5/2021remains in observation, weakness persists  sed rate elevated to 60  C-reactive protein is elevated to 24 7  Has family history of psoriasis, sarcodosis and Graves disease and lab tests pending:  Rheumatoid factor, ANCA, ACE, anti-Scl, anti centromere  Plan is continue IV Decadron and change muscle relaxer, Skelaxin, to as needed  ED Triage Vitals [03/03/21 2233]   Temperature Pulse Respirations Blood Pressure SpO2   98 5 °F (36 9 °C) 103 18 137/76 100 %      Temp Source Heart Rate Source Patient Position - Orthostatic VS BP Location FiO2 (%)   Oral Monitor Sitting Left arm --      Pain Score       8          Wt Readings from Last 1 Encounters:   03/04/21 129 kg (284 lb)     Additional Vital Signs:   03/05/21 0733  97 7 °F (36 5 °C)  89  16  128/78  --  95 %  None (Room air)  Lying   03/04/21 2221  98 2 °F (36 8 °C)  100  16  145/67  --  99 %  None (Room air)  Lying   03/04/21 1553  98 1 °F (36 7 °C)  97  16  140/67  96  100 %  None (Room air)  Sitting   03/04/21 1406  --  98  16  130/58  --  100 %  None (Room air)  Lying   03/04/21 1015  --  83  16  111/56  --  98 %  None (Room air)         Pertinent Labs/Diagnostic Test Results:   3/4/2021 CxR - No acute cardiopulmonary disease  3/4/2021 Xray right tibia fibula - No acute osseous abnormality    3/4/2021 Xray left tibia fibula - No acute osseous abnormality      3/4/2021 Xray lumbar spine - Normal examination    3/4/2021 venous duplex - RIGHT LOWER LIMB:No evidence of acute or chronic deep vein thrombosis  No evidence of superficial thrombophlebitis noted  Doppler evaluation shows a normal response to augmentation maneuvers  Popliteal, posterior tibial and anterior tibial arterial Doppler waveforms are  triphasic  LEFT LOWER LIMB:No evidence of acute or chronic deep vein thrombosis  No evidence of superficial thrombophlebitis noted  Doppler evaluation shows a normal response to augmentation maneuvers  Popliteal, posterior tibial and anterior tibial arterial Doppler waveforms are  Triphasic  3/4/2021 Ct LLE - Unremarkable contrast-enhanced left lower leg CT     3/4/2021 MRI lumbar spine - Mild noncompressive degenerative discogenic disease at L5-S1    Prominent epidural fat at L5-S1 results in developmental tapering of the thecal sac  3/4/2021 EKG - Normal sinus rhythm   Normal ECG   When compared with ECG of 12-AUG-2019 17:04,   No significant change was found     3/4/2021 ct chest abdomen - There is an approximately 3 cm somewhat lobulated-appearing heterogeneous mass within the inferior aspect of the thyroid  Follow-up is required  Thyroid ultrasound is recommended for further evaluation  There is a small hiatal hernia and there is some fluid in the esophagus, suggesting gastroesophageal reflux  The ovaries appear somewhat prominent bilaterally in a relatively symmetric fashion  Reportedly, the patient has a history of polycystic ovarian syndrome  If further evaluation is clinically indicated, pelvic ultrasound could be performed  Ref  Range 3/5/2021 10:43   Sed Rate Latest Ref Range: 0 - 19 mm/hour 60 (H)      Ref   Range 3/5/2021 07:30   C3 Complement Latest Ref Range: 90 0 - 180 0 mg/dL 144 0   C4, COMPLEMENT Latest Ref Range: 10 0 - 40 0 mg/dL 35 0   C-REACTIVE PROTEIN Latest Ref Range: <3 0 mg/L 24 7 (H)     Results from last 7 days   Lab Units 03/04/21  0628 03/04/21  0128   WBC Thousand/uL 9 66 11 26*   HEMOGLOBIN g/dL 12 5 12 9   HEMATOCRIT % 39 0 39 9   PLATELETS Thousands/uL 303  303 324   NEUTROS ABS Thousands/µL 5 66 6 34     Results from last 7 days   Lab Units 03/04/21  0628 03/03/21  2244   SODIUM mmol/L 136 135*   POTASSIUM mmol/L 3 8 3 3*   CHLORIDE mmol/L 102 101   CO2 mmol/L 26 26   ANION GAP mmol/L 8 8   BUN mg/dL 11 13   CREATININE mg/dL 0 72 0 79   EGFR ml/min/1 73sq m 115 103   CALCIUM mg/dL 8 7 8 8   MAGNESIUM mg/dL  --  2 0     Results from last 7 days   Lab Units 03/03/21  2244   AST U/L 21   ALT U/L 46   ALK PHOS U/L 95   TOTAL PROTEIN g/dL 8 2   ALBUMIN g/dL 3 4*   TOTAL BILIRUBIN mg/dL 0 41     Results from last 7 days   Lab Units 03/04/21  0628 03/03/21  2244   GLUCOSE RANDOM mg/dL 168* 127     Results from last 7 days   Lab Units 03/03/21  2244   CK TOTAL U/L 138   CK MB INDEX % 1 7   CK MB ng/mL 2 4     Results from last 7 days   Lab Units 03/03/21  2243   TROPONIN I ng/mL <0 02     Results from last 7 days   Lab Units 03/04/21  0128   D-DIMER QUANTITATIVE ug/ml FEU <0 27     Results from last 7 days   Lab Units 03/04/21  0128   PROTIME seconds 13 4   INR  1 01   PTT seconds 30     Results from last 7 days   Lab Units 03/03/21  2244   TSH 3RD GENERATON uIU/mL 2 998     Results from last 7 days   Lab Units 03/04/21  0128   LACTIC ACID mmol/L 1 4     Results from last 7 days   Lab Units 03/03/21  2244   NT-PRO BNP pg/mL 6     Results from last 7 days   Lab Units 03/04/21  0416   CLARITY UA  Clear   COLOR UA  Yellow   SPEC GRAV UA  1 020   PH UA  7 5   GLUCOSE UA mg/dl Negative   KETONES UA mg/dl Negative   BLOOD UA  Negative   PROTEIN UA mg/dl Negative   NITRITE UA  Negative   BILIRUBIN UA  Negative   UROBILINOGEN UA E U /dl 0 2   LEUKOCYTES UA  Negative     Results from last 7 days   Lab Units 03/04/21  0128   BLOOD CULTURE  No Growth at 24 hrs  No Growth at 24 hrs       ED Treatment:   Medication Administration from 03/03/2021 2216 to 03/04/2021 1536       Date/Time Order Dose Route Action Comments     03/04/2021 0137 potassium chloride (K-DUR,KLOR-CON) CR tablet 40 mEq 40 mEq Oral Given      03/04/2021 0137 HYDROmorphone (DILAUDID) injection 0 2 mg 0 2 mg Intravenous Given      03/04/2021 0414 ceFAZolin (ANCEF) IVPB (premix in dextrose) 1,000 mg 50 mL 1,000 mg Intravenous New Bag urine collected prior to     03/04/2021 0919 sodium chloride 0 9 % infusion 125 mL/hr Intravenous New Bag      03/04/2021 1012 enoxaparin (LOVENOX) subcutaneous injection 40 mg 40 mg Subcutaneous Given      03/04/2021 1402 dexamethasone (DECADRON) injection 6 mg 6 mg Intravenous Given      03/04/2021 1322 iohexol (OMNIPAQUE) 350 MG/ML injection (MULTI-DOSE) 100 mL 100 mL Intravenous Given         Past Medical History:   Diagnosis Date    Hypertension     Obesity     PCOS (polycystic ovarian syndrome)      Present on Admission:   Pain in both lower extremities   HTN (hypertension)      Admitting Diagnosis: Peripheral edema [R60 9]  Leg swelling [M79 89]  Bilateral leg pain [M79 604, M79 605]  Bilateral cellulitis of lower leg [L03 116, L03 115]  Ambulatory dysfunction [R26 2]  Weakness of both lower extremities [R29 898]  Age/Sex: 30 y o  female  Admission Orders:  3/4/2021 0251 observation   Scheduled Medications:  dexamethasone, 6 mg, Intravenous, Q6H MARYBETH  enoxaparin, 40 mg, Subcutaneous, BID  metaxalone, 800 mg, Oral, TID    Continuous IV Infusions:  sodium chloride, 125 mL/hr, Intravenous, Continuous    PRN Meds:  acetaminophen, 650 mg, Oral, Q6H PRN- used x 1   ondansetron, 4 mg, Intravenous, Q6H PRN      IP CONSULT TO NEUROLOGY    Network Utilization Review Department  ATTENTION: Please call with any questions or concerns to [phone] and carefully listen to the prompts so that you are directed to the right person  All voicemails are confidential   Fartun Umanzor all requests for admission clinical reviews, approved or denied determinations and any other requests to dedicated fax number below belonging to the campus where the patient is receiving treatment   List of dedicated fax numbers for the Facilities:  [address] [place] [address] DENIALS (Administrative/Medical Necessity) [phone]   [address] (Maternity/NICU/Pediatrics) [phone] [address] [place] [address] [address] [facility] [address] Ricky Woodard 7560 (  Percy Barrera "Brisa" 103) [address] [place] [address] [address] Mckeon [address] [place] [address] [phone]

## 2021-03-05 NOTE — PLAN OF CARE
Problem: Potential for Falls  Goal: Patient will remain free of falls  Description: INTERVENTIONS:  - Assess patient frequently for physical needs  -  Identify cognitive and physical deficits and behaviors that affect risk of falls    -  [place] fall precautions as indicated by assessment   - Educate patient/family on patient safety including physical limitations  - Instruct patient to call for assistance with activity based on assessment  - Modify environment to reduce risk of injury  - Consider OT/PT consult to assist with strengthening/mobility  Outcome: Progressing     Problem: PAIN - ADULT  Goal: Verbalizes/displays adequate comfort level or baseline comfort level  Description: Interventions:  - Encourage patient to monitor pain and request assistance  - Assess pain using appropriate pain scale  - Administer analgesics based on type and severity of pain and evaluate response  - Implement non-pharmacological measures as appropriate and evaluate response  - Consider cultural and social influences on pain and pain management  - Notify physician/advanced practitioner if interventions unsuccessful or patient reports new pain  Outcome: Progressing     Problem: INFECTION - ADULT  Goal: Absence or prevention of progression during hospitalization  Description: INTERVENTIONS:  - Assess and monitor for signs and symptoms of infection  - Monitor lab/diagnostic results  - Monitor all insertion sites, i e  indwelling lines, tubes, and drains  - Monitor endotracheal if appropriate and nasal secretions for changes in amount and color  - [place] appropriate cooling/warming therapies per order  - Administer medications as ordered  - Instruct and encourage patient and family to use good hand hygiene technique  - Identify and instruct in appropriate isolation precautions for identified infection/condition  Outcome: Progressing  Goal: Absence of fever/infection during neutropenic period  Description: INTERVENTIONS:  - Monitor WBC    Outcome: Progressing     Problem: SAFETY ADULT  Goal: Maintain or return to baseline ADL function  Description: INTERVENTIONS:  -  Assess patient's ability to carry out ADLs; assess patient's baseline for ADL function and identify physical deficits which impact ability to perform ADLs (bathing, care of mouth/teeth, toileting, grooming, dressing, etc )  - Assess/evaluate cause of self-care deficits   - Assess range of motion  - Assess patient's mobility; develop plan if impaired  - Assess patient's need for assistive devices and provide as appropriate  - Encourage maximum independence but intervene and supervise when necessary  - Involve family in performance of ADLs  - Assess for home care needs following discharge   - Consider OT consult to assist with ADL evaluation and planning for discharge  - Provide patient education as appropriate  Outcome: Progressing  Goal: Maintain or return mobility status to optimal level  Description: INTERVENTIONS:  - Assess patient's baseline mobility status (ambulation, transfers, stairs, etc )    - Identify cognitive and physical deficits and behaviors that affect mobility  - Identify mobility aids required to assist with transfers and/or ambulation (gait belt, sit-to-stand, lift, walker, cane, etc )  - [place] fall precautions as indicated by assessment  - Record patient progress and toleration of activity level on Mobility SBAR; progress patient to next Phase/Stage  - Instruct patient to call for assistance with activity based on assessment  - Consider rehabilitation consult to assist with strengthening/weightbearing, etc   Outcome: Progressing     Problem: DISCHARGE PLANNING  Goal: Discharge to home or other facility with appropriate resources  Description: INTERVENTIONS:  - Identify barriers to discharge w/patient and caregiver  - Arrange for needed discharge resources and transportation as appropriate  - Identify discharge learning needs (meds, wound care, etc )  - Arrange for interpretive services to assist at discharge as needed  - Refer to Case Management Department for coordinating discharge planning if the patient needs post-hospital services based on physician/advanced practitioner order or complex needs related to functional status, cognitive ability, or social support system  Outcome: Progressing     Problem: Knowledge Deficit  Goal: Patient/family/caregiver demonstrates understanding of disease process, treatment plan, medications, and discharge instructions  Description: Complete learning assessment and assess knowledge base    Interventions:  - Provide teaching at level of understanding  - Provide teaching via preferred learning methods  Outcome: Progressing

## 2021-03-05 NOTE — PLAN OF CARE
Problem: PHYSICAL THERAPY ADULT  Goal: Performs mobility at highest level of function for planned discharge setting  See evaluation for individualized goals  Description: Treatment/Interventions: Functional transfer training, LE strengthening/ROM, Elevations, Therapeutic exercise, Endurance training, Patient/family training, Equipment eval/education, Bed mobility, Gait training  Equipment Recommended: Munira Alvarez       See flowsheet documentation for full assessment, interventions and recommendations  Note: Prognosis: Good  Problem List: Decreased strength, Decreased range of motion, Impaired balance, Decreased mobility, Impaired sensation, Obesity  Assessment: Ericka Negrete is a 30 y o  Female who [facility]4/2021 from home w/ c/o leg swelling and difficulty ambulating and diagnosis of pain in BLEs  Orders for PT eval and treat received, w/ activity orders of ambulate patient and fall precautions  Pt presents w/ comorbidities of PCOS, hypothyroidism, sarcoidosis, morbid obesity and personal factors including: living in 2 story house and inability to navigate level surfaces w/o external assistance  At baseline, pt mobilizes independently without restriction, and reports 0 falls in the last 6 months  Upon evaluation, pt presents w/ the following deficits: weakness, decreased ROM, altered sensation, impaired balance and pain limiting functional mobility  Pt requires S for transfers, and S for gait w/ RW  Pt's clinical presentation is unstable/unpredictable due to poor blood pressure control, need for assist w/ all phases of mobility when usually mobilizing independently, pain impacting overall mobility status, need for input for mobility technique/safety and recent drastic decline in mobility compared to baseline  Given the above findings, discharge recommendation is for outpatient PT and home w/ family support pending medical optimization and mobility progress   During this admission, pt would benefit from skilled acute inpatient PT in order to address the abovementioned deficits to maximize function and mobility before DC from acute care  PT Discharge Recommendation: Return to previous environment with social support, Home with skilled therapy(OPPT pending progress (vs HHPT))          See flowsheet documentation for full assessment

## 2021-03-06 VITALS
DIASTOLIC BLOOD PRESSURE: 83 MMHG | SYSTOLIC BLOOD PRESSURE: 132 MMHG | OXYGEN SATURATION: 97 % | BODY MASS INDEX: 48.49 KG/M2 | WEIGHT: 284 LBS | TEMPERATURE: 97.9 F | HEIGHT: 64 IN | RESPIRATION RATE: 18 BRPM | HEART RATE: 82 BPM

## 2021-03-06 PROBLEM — E04.1 THYROID NODULE: Status: ACTIVE | Noted: 2021-03-06

## 2021-03-06 LAB
ALBUMIN SERPL BCP-MCNC: 3 G/DL (ref 3.5–5)
ANION GAP SERPL CALCULATED.3IONS-SCNC: 9 MMOL/L (ref 4–13)
B BURGDOR IGG+IGM SER-ACNC: 44
BUN SERPL-MCNC: 12 MG/DL (ref 5–25)
CALCIUM ALBUM COR SERPL-MCNC: 9.5 MG/DL (ref 8.3–10.1)
CALCIUM SERPL-MCNC: 8.7 MG/DL (ref 8.3–10.1)
CENTROMERE B AB SER-ACNC: <0.2 AI (ref 0–0.9)
CHLORIDE SERPL-SCNC: 106 MMOL/L (ref 100–108)
CO2 SERPL-SCNC: 23 MMOL/L (ref 21–32)
CREAT SERPL-MCNC: 0.58 MG/DL (ref 0.6–1.3)
ENA SCL70 AB SER-ACNC: <0.2 AI (ref 0–0.9)
GFR SERPL CREATININE-BSD FRML MDRD: 127 ML/MIN/1.73SQ M
GLUCOSE SERPL-MCNC: 114 MG/DL (ref 65–140)
PHOSPHATE SERPL-MCNC: 2.9 MG/DL (ref 2.7–4.5)
POTASSIUM SERPL-SCNC: 3.6 MMOL/L (ref 3.5–5.3)
SODIUM SERPL-SCNC: 138 MMOL/L (ref 136–145)
T3 SERPL-MCNC: 0.8 NG/ML (ref 0.6–1.8)
T4 FREE SERPL-MCNC: 1.37 NG/DL (ref 0.76–1.46)

## 2021-03-06 PROCEDURE — 99217 PR OBSERVATION CARE DISCHARGE MANAGEMENT: CPT | Performed by: INTERNAL MEDICINE

## 2021-03-06 PROCEDURE — 86200 CCP ANTIBODY: CPT | Performed by: INTERNAL MEDICINE

## 2021-03-06 PROCEDURE — 80069 RENAL FUNCTION PANEL: CPT | Performed by: INTERNAL MEDICINE

## 2021-03-06 PROCEDURE — 84480 ASSAY TRIIODOTHYRONINE (T3): CPT | Performed by: INTERNAL MEDICINE

## 2021-03-06 PROCEDURE — 84439 ASSAY OF FREE THYROXINE: CPT | Performed by: INTERNAL MEDICINE

## 2021-03-06 RX ORDER — GABAPENTIN 300 MG/1
300 CAPSULE ORAL
Qty: 30 CAPSULE | Refills: 0 | Status: SHIPPED | OUTPATIENT
Start: 2021-03-06 | End: 2021-06-09

## 2021-03-06 RX ORDER — PREDNISONE 10 MG/1
TABLET ORAL
Qty: 20 TABLET | Refills: 0 | Status: SHIPPED | OUTPATIENT
Start: 2021-03-07 | End: 2021-03-15

## 2021-03-06 RX ADMIN — SODIUM CHLORIDE 125 ML/HR: 0.9 INJECTION, SOLUTION INTRAVENOUS at 06:52

## 2021-03-06 RX ADMIN — DEXAMETHASONE SODIUM PHOSPHATE 6 MG: 4 INJECTION, SOLUTION INTRA-ARTICULAR; INTRALESIONAL; INTRAMUSCULAR; INTRAVENOUS; SOFT TISSUE at 12:46

## 2021-03-06 RX ADMIN — DEXAMETHASONE SODIUM PHOSPHATE 6 MG: 4 INJECTION, SOLUTION INTRA-ARTICULAR; INTRALESIONAL; INTRAMUSCULAR; INTRAVENOUS; SOFT TISSUE at 06:50

## 2021-03-06 RX ADMIN — ACETAMINOPHEN 650 MG: 325 TABLET, FILM COATED ORAL at 06:58

## 2021-03-06 RX ADMIN — ENOXAPARIN SODIUM 40 MG: 40 INJECTION SUBCUTANEOUS at 08:56

## 2021-03-06 RX ADMIN — ACETAMINOPHEN 650 MG: 325 TABLET, FILM COATED ORAL at 12:46

## 2021-03-06 NOTE — ASSESSMENT & PLAN NOTE
· Patient scheduled to undergo gastric sleeve in April  · Encouraged healthy diet and lifestyle modifications

## 2021-03-06 NOTE — DISCHARGE SUMMARY
Discharge- Naya Santiago 1995, 30 y o  female MRN: [id number]    Unit/Bed#: S -01 Encounter: [id number]    Primary Care Provider: Glynn Almodovar DO   Date and time admitted to hospital: 3/4/2021 12:33 AM        * Pain in both lower extremities  Assessment & Plan  · Reports progressive bilateral lower extremity weakness over the past 3 months  Worsening pain over the past 2 days  · Etiology is uncertain as all imaging has been negative to date:  · Venous Doppler negative  · CT chest, abdomen, pelvis with no acute pathology  · X-ray of tibia unremarkable  · CT scan of lower extremity unremarkable  · X-ray and MRI of spine with no findings  · ESR and CRP elevated  · D-dimer negative  · CK unremarkable  · Family history of autoimmune disease:  Psoriasis, hypothyroidism, Graves disease, sarcoidosis    · Negative MERCED, normal C3, normal C4, negative scleroderma antibody, negative centromere antibody, negative Lyme antibody  · Pending rheumatoid factor, ANCA, ACE  · Follow-up with rheumatology and neurology outpatient  · Stable for discharge today on prednisone taper and gabapentin        Thyroid nodule  Assessment & Plan  · Incidental finding of thyroid nodule on CT scan of chest, abdomen, and pelvis  · TSH, T4, T3 within normal limits  · Asymptomatic in regards to symptoms concerning for hyper or hypothyroidism  · Thyroid ultrasound as outpatient    Leg swelling  Assessment & Plan  · Bilateral lower extremity trace edema accompanied by pain/weakness, may be secondary to IV fluids and inflammatory process  · All lower extremity imaging unremarkable  · Pulses intact  · D-dimer negative      Morbid obesity with BMI of 45 0-49 9, adult [facility])  Assessment & Plan  · Patient scheduled to undergo gastric sleeve in April  · Encouraged healthy diet and lifestyle modifications    HTN (hypertension)  Assessment & Plan  · Stable  · Follow-up with PCP          Discharging Resident Physician: Jeevan Schwartz MD  Attending: Wally Paz DO  PCP: Mamta Gutierrez DO  Admission Date: 3/4/2021  Discharge Date: 03/06/21    Disposition:     Home    Reason for Admission:  Leg pain    Consultations During Hospital Stay:  · Neurology    Procedures Performed:   · None    Significant Findings / Test Results:   · None    Incidental Findings:   · Ct Chest Abdomen Pelvis W Contrast on 3/5/2021: Impression: There is an approximately 3 cm somewhat lobulated-appearing heterogeneous mass within the inferior aspect of the thyroid  Follow-up is required  Thyroid ultrasound is recommended for further evaluation  Test Results Pending at Discharge (will require follow up): · Rheumatoid factor, ANCA, ACE     Outpatient Tests Requested:  · Thyroid ultrasound    Complications:  None    Hospital Course:     Yesenia Roper is a 30 y o  female patient with past medical history of morbid obesity, hypertension and PCOS who originally presented to the hospital on 3/4/2021 due to pain and weakness in both legs, worse on the left  Patient found it increasingly difficult to perform ADLs due to pain and weakness  Patient was seen by OBGYN and at that time she was worked up for DVT with lower extremity Dopplers  Dopplers were negative at that time  Patient reports that her weakness and pain have progressed since then  No urinary or bowel incontinence  No numbness or sensory deficits  Patient denies any recent falls or trauma, current OCP use, travel, or insect bites  Patient was given Keflex in the emergency department due to WBC of 11k, mild erythema and swelling of LE  Venous doppler was negative and D-dimer normal  X-rays of both tibias and left lower extremity CT were unremarkable  MRI lumbar spine shows no significant disc or compressive disease  CT chest abdomen pelvis had no acute pathology    She was seen by Neurology and due to mildly elevated ESR and CRP and unremarkable imaging, thought process was an inflammatory condition like polymyalgia rheumatica  She was started on IV Decadron and was switched to oral prednisone for discharge  Labs were ordered for immunologic workup  Noted elevated inflammatory markers  Pending labs will be followed up as outpatient  She is stable for discharge home today  Condition at Discharge: stable     Discharge Day Visit / Exam:     Subjective:    Patient seen this morning in no acute distress  She reports persistent throbbing pain in her left calf however she reports some improvement from previous  She denies chest pain, SOB, fever or chills  She is stable for discharge today  Vitals: Blood Pressure: 132/83 (03/06/21 0708)  Pulse: 82 (03/06/21 0708)  Temperature: 97 9 °F (36 6 °C) (03/06/21 0708)  Temp Source: Oral (03/06/21 1444)  Respirations: 18 (03/06/21 0708)  Height: 5' 4" (162 6 cm) (03/04/21 1553)  Weight - Scale: 129 kg (284 lb) (03/04/21 1553)  SpO2: 97 % (03/06/21 0708)    Exam:   Physical Exam  Vitals signs reviewed  Constitutional:       General: She is not in acute distress  Appearance: She is obese  She is not ill-appearing  HENT:      Head: Normocephalic and atraumatic  Mouth/Throat:      Mouth: Mucous membranes are moist       Pharynx: Oropharynx is clear  Eyes:      Extraocular Movements: Extraocular movements intact  Conjunctiva/sclera: Conjunctivae normal       Pupils: Pupils are equal, round, and reactive to light  Neck:      Musculoskeletal: Normal range of motion and neck supple  Cardiovascular:      Rate and Rhythm: Normal rate and regular rhythm  Pulses: Normal pulses  Heart sounds: Normal heart sounds  Pulmonary:      Effort: Pulmonary effort is normal  No respiratory distress  Breath sounds: Normal breath sounds  Abdominal:      General: Bowel sounds are normal  There is no distension  Palpations: Abdomen is soft  Tenderness: There is no abdominal tenderness     Musculoskeletal:      Comments: Tenderness to palpation of left calf, no erythema or bruising, trace edema, slightly decreased range of motion of left leg secondary to pain but preserved strength, intact sensation   Skin:     General: Skin is warm and dry  Findings: No rash  Neurological:      General: No focal deficit present  Mental Status: She is alert and oriented to person, place, and time  Sensory: No sensory deficit  Psychiatric:         Mood and Affect: Mood normal          Behavior: Behavior normal          Thought Content: Thought content normal          Judgment: Judgment normal        Discussion with Family:  Discussed with patient's sister    Discharge instructions/Information to patient and family:   See after visit summary for information provided to patient and family  Provisions for Follow-Up Care:  See after visit summary for information related to follow-up care and any pertinent home health orders  Planned Readmission:  None     Discharge Medications:  See after visit summary for reconciled discharge medications provided to patient and family        ** Please Note: This note has been constructed using a voice recognition system **

## 2021-03-06 NOTE — PLAN OF CARE
Problem: Potential for Falls  Goal: Patient will remain free of falls  Description: INTERVENTIONS:  - Assess patient frequently for physical needs  -  Identify cognitive and physical deficits and behaviors that affect risk of falls    -  [place] fall precautions as indicated by assessment   - Educate patient/family on patient safety including physical limitations  - Instruct patient to call for assistance with activity based on assessment  - Modify environment to reduce risk of injury  - Consider OT/PT consult to assist with strengthening/mobility  Outcome: Progressing     Problem: PAIN - ADULT  Goal: Verbalizes/displays adequate comfort level or baseline comfort level  Description: Interventions:  - Encourage patient to monitor pain and request assistance  - Assess pain using appropriate pain scale  - Administer analgesics based on type and severity of pain and evaluate response  - Implement non-pharmacological measures as appropriate and evaluate response  - Consider cultural and social influences on pain and pain management  - Notify physician/advanced practitioner if interventions unsuccessful or patient reports new pain  Outcome: Progressing     Problem: INFECTION - ADULT  Goal: Absence or prevention of progression during hospitalization  Description: INTERVENTIONS:  - Assess and monitor for signs and symptoms of infection  - Monitor lab/diagnostic results  - Monitor all insertion sites, i e  indwelling lines, tubes, and drains  - Monitor endotracheal if appropriate and nasal secretions for changes in amount and color  - [place] appropriate cooling/warming therapies per order  - Administer medications as ordered  - Instruct and encourage patient and family to use good hand hygiene technique  - Identify and instruct in appropriate isolation precautions for identified infection/condition  Outcome: Progressing  Goal: Absence of fever/infection during neutropenic period  Description: INTERVENTIONS:  - Monitor WBC    Outcome: Progressing     Problem: SAFETY ADULT  Goal: Maintain or return to baseline ADL function  Description: INTERVENTIONS:  -  Assess patient's ability to carry out ADLs; assess patient's baseline for ADL function and identify physical deficits which impact ability to perform ADLs (bathing, care of mouth/teeth, toileting, grooming, dressing, etc )  - Assess/evaluate cause of self-care deficits   - Assess range of motion  - Assess patient's mobility; develop plan if impaired  - Assess patient's need for assistive devices and provide as appropriate  - Encourage maximum independence but intervene and supervise when necessary  - Involve family in performance of ADLs  - Assess for home care needs following discharge   - Consider OT consult to assist with ADL evaluation and planning for discharge  - Provide patient education as appropriate  Outcome: Progressing  Goal: Maintain or return mobility status to optimal level  Description: INTERVENTIONS:  - Assess patient's baseline mobility status (ambulation, transfers, stairs, etc )    - Identify cognitive and physical deficits and behaviors that affect mobility  - Identify mobility aids required to assist with transfers and/or ambulation (gait belt, sit-to-stand, lift, walker, cane, etc )  - [place] fall precautions as indicated by assessment  - Record patient progress and toleration of activity level on Mobility SBAR; progress patient to next Phase/Stage  - Instruct patient to call for assistance with activity based on assessment  - Consider rehabilitation consult to assist with strengthening/weightbearing, etc   Outcome: Progressing     Problem: DISCHARGE PLANNING  Goal: Discharge to home or other facility with appropriate resources  Description: INTERVENTIONS:  - Identify barriers to discharge w/patient and caregiver  - Arrange for needed discharge resources and transportation as appropriate  - Identify discharge learning needs (meds, wound care, etc )  - Arrange for interpretive services to assist at discharge as needed  - Refer to Case Management Department for coordinating discharge planning if the patient needs post-hospital services based on physician/advanced practitioner order or complex needs related to functional status, cognitive ability, or social support system  Outcome: Progressing     Problem: Knowledge Deficit  Goal: Patient/family/caregiver demonstrates understanding of disease process, treatment plan, medications, and discharge instructions  Description: Complete learning assessment and assess knowledge base  Interventions:  - Provide teaching at level of understanding  - Provide teaching via preferred learning methods  Outcome: Progressing     Problem: Nutrition/Hydration-ADULT  Goal: Nutrient/Hydration intake appropriate for improving, restoring or maintaining nutritional needs  Description: Monitor and assess patient's nutrition/hydration status for malnutrition  Collaborate with interdisciplinary team and initiate plan and interventions as ordered  Monitor patient's weight and dietary intake as ordered or per policy  Utilize nutrition screening tool and intervene as necessary  Determine patient's food preferences and provide high-protein, high-caloric foods as appropriate       INTERVENTIONS:  - Monitor oral intake, urinary output, labs, and treatment plans  - Assess nutrition and hydration status and recommend course of action  - Evaluate amount of meals eaten  - Assist patient with eating if necessary   - Allow adequate time for meals  - Recommend/ encourage appropriate diets, oral nutritional supplements, and vitamin/mineral supplements  - Order, calculate, and assess calorie counts as needed  - Recommend, monitor, and adjust tube feedings and TPN/PPN based on assessed needs  - Assess need for intravenous fluids  - Provide specific nutrition/hydration education as appropriate  - Include patient/family/caregiver in decisions related to nutrition  Outcome: Progressing

## 2021-03-06 NOTE — ASSESSMENT & PLAN NOTE
· Incidental finding of thyroid nodule on CT scan of chest, abdomen, and pelvis  · TSH, T4, T3 within normal limits  · Asymptomatic in regards to symptoms concerning for hyper or hypothyroidism  · Thyroid ultrasound as outpatient

## 2021-03-06 NOTE — DISCHARGE INSTRUCTIONS
Leg Pain   WHAT YOU NEED TO KNOW:   Leg pain may be caused by a variety of health conditions  Your tests did not show any broken bones or blood clots  DISCHARGE INSTRUCTIONS:   Return to the emergency department if:   · You have a fever  · Your leg starts to swell  · Your leg pain gets worse  · You have numbness or tingling in your leg or toes  · You cannot put any weight on or move your leg  Contact your healthcare provider if:   · Your pain does not decrease, even after treatment  · You have questions or concerns about your condition or care  Medicines:   · NSAIDs , such as ibuprofen, help decrease swelling, pain, and fever  This medicine is available with or without a doctor's order  NSAIDs can cause stomach bleeding or kidney problems in certain people  If you take blood thinner medicine, always ask your healthcare provider if NSAIDs are safe for you  Always read the medicine label and follow directions  · Take your medicine as directed  Contact your healthcare provider if you think your medicine is not helping or if you have side effects  Tell him of her if you are allergic to any medicine  Keep a list of the medicines, vitamins, and herbs you take  Include the amounts, and when and why you take them  Bring the list or the pill bottles to follow-up visits  Carry your medicine list with you in case of an emergency  Follow up with your healthcare provider as directed: You may need more tests to find the cause of your leg pain  You may need to see an orthopedic specialist or a physical therapist  Write down your questions so you remember to ask them during your visits  Manage your leg pain:   · Rest  your injured leg so that it can heal  You may need an immobilizer, brace, or splint to limit the movement of your leg  You may need to avoid putting any weight on your leg for at least 48 hours  Return to normal activities as directed      · Ice  the injury for 20 minutes every 4 hours for up to 24 hours, or as directed  Use an ice pack, or put crushed ice in a plastic bag  Cover it with a towel to protect your skin  Ice helps prevent tissue damage and decreases swelling and pain  · Elevate  your injured leg above the level of your heart as often as you can  This will help decrease swelling and pain  If possible, prop your leg on pillows or blankets to keep the area elevated comfortably  · Use assistive devices as directed  You may need to use a cane or crutches  Assistive devices help decrease pain and pressure on your leg when you walk  Ask your healthcare provider for more information about assistive devices and how to use them correctly  · Maintain a healthy weight  Extra body weight can cause pressure and pain in your hip, knee, and ankle joints  Ask your healthcare provider how much you should weigh  Ask him to help you create a weight loss plan if you are overweight  © Copyright [address] Information is for End User's use only and may not be sold, redistributed or otherwise used for commercial purposes  All illustrations and images included in CareNotes® are the copyrighted property of [employer]  or [facility] [place] Sofía Mares   The above information is an  only  It is not intended as medical advice for individual conditions or treatments  Talk to your doctor, nurse or pharmacist before following any medical regimen to see if it is safe and effective for you

## 2021-03-06 NOTE — UTILIZATION REVIEW
Continued Stay Review    Date: 3/6                     Current Patient Class: OBSERVATION  Current Level of Care: Med surg     HPI:25 y o  female initially admitted on 3/4 due to worsening LE edema and weakness  D Dime, venous doppler  negative  TSH WNL, R?O autoimmune disease, sarcoidosis, lumbar disc disease  CRP elevated IV fluid continues, for contrast studies  BMI 48 75  Assessment/Plan:       Neurology consult recommends IV steroids  Hyperesthesia to touch, bilat LE's, significant Lumbar tenderness, CT scan pelvis MRI lumbar spine pending  Pertinent Labs/Diagnostic Results:    MRI lumbar 3/4  Mild noncompressive degenerative discogenic disease at L5-S1      Prominent epidural fat at L5-S1 results in developmental tapering of the thecal sac  CT LE 3/4  IMPRESSION:     Unremarkable contrast-enhanced left lower leg CT  CT AP 3/4  There is an approximately 3 cm somewhat lobulated-appearing heterogeneous mass within the inferior aspect of the thyroid  Follow-up is required  Thyroid ultrasound is recommended for further evaluation      There is a small hiatal hernia and there is some fluid in the esophagus, suggesting gastroesophageal reflux      The ovaries appear somewhat prominent bilaterally in a relatively symmetric fashion  Reportedly, the patient has a history of polycystic ovarian syndrome    If further evaluation is clinically indicated, pelvic ultrasound could be performed             Results from last 7 days   Lab Units 03/04/21  0628 03/04/21  0128   WBC Thousand/uL 9 66 11 26*   HEMOGLOBIN g/dL 12 5 12 9   HEMATOCRIT % 39 0 39 9   PLATELETS Thousands/uL 303  303 324   NEUTROS ABS Thousands/µL 5 66 6 34         Results from last 7 days   Lab Units 03/06/21  0440 03/05/21  0730 03/04/21  0628 03/03/21  2244   SODIUM mmol/L 138 135* 136 135*   POTASSIUM mmol/L 3 6 4 1 3 8 3 3*   CHLORIDE mmol/L 106 103 102 101   CO2 mmol/L 23 22 26 26   ANION GAP mmol/L 9 10 8 8   BUN mg/dL 12 11 11 13 CREATININE mg/dL 0 58* 0 67 0 72 0 79   EGFR ml/min/1 73sq m 127 121 115 103   CALCIUM mg/dL 8 7 8 8 8 7 8 8   MAGNESIUM mg/dL  --   --   --  2 0   PHOSPHORUS mg/dL 2 9  --   --   --      Results from last 7 days   Lab Units 03/06/21  0440 03/05/21  0730 03/03/21  2244   AST U/L  --  14 21   ALT U/L  --  34 46   ALK PHOS U/L  --  78 95   TOTAL PROTEIN g/dL  --  7 7 8 2   ALBUMIN g/dL 3 0* 3 0* 3 4*   TOTAL BILIRUBIN mg/dL  --  0 32 0 41         Results from last 7 days   Lab Units 03/06/21  0440 03/05/21  0730 03/04/21  0628 03/03/21  2244   GLUCOSE RANDOM mg/dL 114 128 168* 127             No results found for: BETA-HYDROXYBUTYRATE               Results from last 7 days   Lab Units 03/03/21  2244   CK TOTAL U/L 138   CK MB INDEX % 1 7   CK MB ng/mL 2 4     Results from last 7 days   Lab Units 03/03/21  2243   TROPONIN I ng/mL <0 02     Results from last 7 days   Lab Units 03/04/21  0128   D-DIMER QUANTITATIVE ug/ml FEU <0 27     Results from last 7 days   Lab Units 03/04/21  0128   PROTIME seconds 13 4   INR  1 01   PTT seconds 30     Results from last 7 days   Lab Units 03/03/21  2244   TSH 3RD GENERATON uIU/mL 2 998         Results from last 7 days   Lab Units 03/04/21  0128   LACTIC ACID mmol/L 1 4             Results from last 7 days   Lab Units 03/03/21  2244   NT-PRO BNP pg/mL 6                 Results from last 7 days   Lab Units 03/05/21  1043 03/05/21  0730   CRP mg/L  --  24 7*   SED RATE mm/hour 60*  --          Results from last 7 days   Lab Units 03/04/21  0416   CLARITY UA  Clear   COLOR UA  Yellow   SPEC GRAV UA  1 020   PH UA  7 5   GLUCOSE UA mg/dl Negative   KETONES UA mg/dl Negative   BLOOD UA  Negative   PROTEIN UA mg/dl Negative   NITRITE UA  Negative   BILIRUBIN UA  Negative   UROBILINOGEN UA E U /dl 0 2   LEUKOCYTES UA  Negative                                 Results from last 7 days   Lab Units 03/04/21  0128   BLOOD CULTURE  No Growth at 48 hrs  No Growth at 48 hrs                 Vital Signs:   Date/Time  Temp  Pulse  Resp  BP   SpO2  O2 Device  Patient Position - Orthostatic VS   03/06/21 0708  97 9 °F (36 6 °C)  82  18  132/83   97 %  None (Room air)  Lying   03/05/21 2204  97 8 °F (36 6 °C)  97  18  130/59   98 %  None (Room air)  Lying   03/05/21 1602  97 9 °F (36 6 °C)  104  18  157/71   97 %  None (Room air)  Sitting         Medications:   Scheduled Medications:  dexamethasone, 6 mg, Intravenous, Q6H MARYBETH  enoxaparin, 40 mg, Subcutaneous, BID  gabapentin, 300 mg, Oral, HS  ondansetron, 4 mg, Intravenous, Once      Continuous IV Infusions:  sodium chloride, 125 mL/hr, Intravenous, Continuous      PRN Meds:  acetaminophen, 650 mg, Oral, Q6H PRN  metaxalone, 800 mg, Oral, Q6H PRN  ondansetron, 4 mg, Intravenous, Q6H PRN        Discharge Plan: D     Network Utilization Review Department  ATTENTION: Please call with any questions or concerns to [phone] and carefully listen to the prompts so that you are directed to the right person  All voicemails are confidential   Madison Ferrer all requests for admission clinical reviews, approved or denied determinations and any other requests to dedicated fax number below belonging to the campus where the patient is receiving treatment   List of dedicated fax numbers for the Facilities:  [address] [place] [address] DENIALS (Administrative/Medical Necessity) [phone]   [address] (Maternity/NICU/Pediatrics) [phone]   [address]   [address] [address] [facility] Maiaida Alexidon Woodard 3250 (  Percy Barrera "Brisa" 103) [address] [facility] [address] - [place] [address] [phone]

## 2021-03-06 NOTE — INCIDENTAL FINDINGS
The following findings require follow up:  Radiographic finding   Finding:  "Heterogeneous mass within the inferior aspect of the thyroid, near the junction of the left lobe with isthmus  This mass appears somewhat lobulated and measures approximately 3 cm" on CT scan of chest, abdomen, and pelvis with IV contrast   Follow up required:  Thyroid ultrasound   Follow up should be done within 4 week(s)    Please notify the following clinician to assist with the follow up:    Your primary care provider, Dr Jose Alejandro Melara

## 2021-03-06 NOTE — ASSESSMENT & PLAN NOTE
· Bilateral lower extremity trace edema accompanied by pain/weakness, may be secondary to IV fluids and inflammatory process  · All lower extremity imaging unremarkable  · Pulses intact  · D-dimer negative

## 2021-03-06 NOTE — ASSESSMENT & PLAN NOTE
· Reports progressive bilateral lower extremity weakness over the past 3 months  Worsening pain over the past 2 days  · Etiology is uncertain as all imaging has been negative to date:  · Venous Doppler negative  · CT chest, abdomen, pelvis with no acute pathology  · X-ray of tibia unremarkable  · CT scan of lower extremity unremarkable  · X-ray and MRI of spine with no findings  · ESR and CRP elevated  · D-dimer negative  · CK unremarkable  · Family history of autoimmune disease:  Psoriasis, hypothyroidism, Graves disease, sarcoidosis    · Negative MERCED, normal C3, normal C4, negative scleroderma antibody, negative centromere antibody, negative Lyme antibody  · Pending rheumatoid factor, ANCA, ACE  · Follow-up with rheumatology and neurology outpatient  · Stable for discharge today on prednisone taper and gabapentin

## 2021-03-06 NOTE — CASE MANAGEMENT
Pt is stable for DC and is in need of a wide RW  Script has been placed in Epic and this was attached to referral made to [facility] for the RW  Mike Dinero has been delivered to pt's room as she is discharged

## 2021-03-06 NOTE — DISCHARGE INSTR - AVS FIRST PAGE
Dear Maicol Benitez,     It was our pleasure to care for you here at [facility]  It is our hope that we were always able to exceed the expected standards for your care during your stay  You were hospitalized due to leg pain and weakness  You were cared for on the 4th floor by Karlos Asher MD under the service of Mukund Brown DO with the [facility] who covers for your primary care physician (PCP), Bishnu Maharaj DO, while you were hospitalized  If you have any questions or concerns related to this hospitalization, you may contact us at [phone]  For follow up as well as any medication refills, we recommend that you follow up with your primary care physician  A registered nurse will reach out to you by phone within a few days after your discharge to answer any additional questions that you may have after going home  However, at this time we provide for you here, the most important instructions / recommendations at discharge:     · Notable Medication Adjustments -   · Please take prednisone 40 mg on 3/7, 40 mg on 3/8, 30 mg on 3/9, 30 mg on 3/10, 20 mg on 3/11, 20 mg on 3/12, 10 mg on 3/13, and 10 mg on 3/14 and then stop  · Taking gabapentin 300 mg at bedtime  · Testing Required after Discharge -   · Thyroid ultrasound  · Important follow up information -   · Please follow-up with your primary care physician within 1 week of discharge  · Please follow-up with Neurology  · Please follow-up with rheumatology  · Other Instructions -   Practice social distancing  Adequate hand washing hygiene  Wear mask in public at all times  · Please review this entire after visit summary as additional general instructions including medication list, appointments, activity, diet, any pertinent wound care, and other additional recommendations from your care team that may be provided for you        Sincerely,     Karlos Asher MD

## 2021-03-08 ENCOUNTER — TELEPHONE (OUTPATIENT)
Dept: NEUROLOGY | Facility: CLINIC | Age: 26
End: 2021-03-08

## 2021-03-08 LAB
ACE SERPL-CCNC: 20 U/L (ref 14–82)
CCP AB SER IA-ACNC: 0.7
RHEUMATOID FACT SER QL LA: NEGATIVE

## 2021-03-08 NOTE — TELEPHONE ENCOUNTER
Spoke to pt, she states she isn't interest in scheduling at this time  I advised her if she changes her mind to give us a call, provided my name and number  SLRA/Weakness of Arsenio Lower Extremities/Cap BC    NOTE FROM CHART:  Reason for Consult / Principal Problem:   Weakness of bilateral lower extremities  This patient needs outpatient follow-up with Neurology in a month or so  She is scheduled to have a gastric bypass in April    There is no need for her to see us emergently before her gastric bypass based on her current exam

## 2021-03-09 LAB
BACTERIA BLD CULT: NORMAL
BACTERIA BLD CULT: NORMAL
C-ANCA TITR SER IF: ABNORMAL TITER
MYELOPEROXIDASE AB SER IA-ACNC: <9 U/ML (ref 0–9)
P-ANCA ATYPICAL TITR SER IF: ABNORMAL TITER
P-ANCA TITR SER IF: ABNORMAL TITER
PROTEINASE3 AB SER IA-ACNC: 7.5 U/ML (ref 0–3.5)

## 2021-03-10 ENCOUNTER — TELEPHONE (OUTPATIENT)
Dept: OTHER | Facility: OTHER | Age: 26
End: 2021-03-10

## 2021-03-10 ENCOUNTER — OFFICE VISIT (OUTPATIENT)
Dept: BARIATRICS | Facility: CLINIC | Age: 26
End: 2021-03-10

## 2021-03-10 VITALS — WEIGHT: 284.6 LBS | HEIGHT: 64 IN | BODY MASS INDEX: 48.59 KG/M2

## 2021-03-10 DIAGNOSIS — E66.01 OBESITY, CLASS III, BMI 40-49.9 (MORBID OBESITY) (HCC): Primary | ICD-10-CM

## 2021-03-10 PROCEDURE — RECHECK

## 2021-03-10 NOTE — PROGRESS NOTES
Bariatric Follow Up Nutrition Note    Preop /4 weight checks  Preop Program    Type of surgery  Preop  Surgery Date: TBD    Surgeon: Dr Tristen Quinonez  30 y o   female  Height 5' 4" (1 626 m), weight 129 kg (284 lb 9 6 oz), not currently breastfeeding  Body mass index is 48 85 kg/m²          Review of History and Medications   Past Medical History:   Diagnosis Date    Hypertension     Obesity     PCOS (polycystic ovarian syndrome)      Past Surgical History:   Procedure Laterality Date    NO PAST SURGERIES       Social History     Socioeconomic History    Marital status: /Civil Union     Spouse name: Not on file    Number of children: Not on file    Years of education: Not on file    Highest education level: Not on file   Occupational History    Not on file   Social Needs    Financial resource strain: Not on file    Food insecurity     Worry: Not on file     Inability: Not on file   [employer] needs     Medical: Not on file     Non-medical: Not on file   Tobacco Use    Smoking status: Former Smoker     Types: Cigarettes     Quit date:      Years since quittin 1    Smokeless tobacco: Never Used    Tobacco comment: patient admits to hx of 1 or 2 cigarettes a year   Substance and Sexual Activity    Alcohol use: No    Drug use: No    Sexual activity: Yes     Partners: Male     Birth control/protection: OCP   Lifestyle    Physical activity     Days per week: Not on file     Minutes per session: Not on file    Stress: Not on file   Relationships    Social connections     Talks on phone: Not on file     Gets together: Not on file     Attends Orthodoxy service: Not on file     Active member of club or organization: Not on file     Attends meetings of clubs or organizations: Not on file     Relationship status: Not on file    Intimate partner violence     Fear of current or ex partner: Not on file     Emotionally abused: Not on file Physically abused: Not on file     Forced sexual activity: Not on file   Other Topics Concern    Not on file   Social History Narrative    Not on file       Current Outpatient Medications:     gabapentin (NEURONTIN) 300 mg capsule, Take 1 capsule (300 mg total) by mouth daily at bedtime, Disp: 30 capsule, Rfl: 0    predniSONE 10 mg tablet, Take 4 tablets (40 mg total) by mouth daily for 2 days, THEN 3 tablets (30 mg total) daily for 2 days, THEN 2 tablets (20 mg total) daily for 2 days, THEN 1 tablet (10 mg total) daily for 2 days  , Disp: 20 tablet, Rfl: 0    Food Intake and Lifestyle Assessment   Food Intake Assessment completed via usual diet recall  Breakfast: [employer]  Snack: 0   Lunch: grapes with cheese or yogurt parfait  Snack: 0  Dinner: cooks for  doesn't eat it-frozen healthy dinner 23-29 gm protein  Snack: 0  Beverage intake: water, sweetened beverages and coffee/tea  Diet texture/stage: regular  Protein supplement: 0  Estimated protein intake per day: 60 gm  Estimated fluid intake per day: 64 oz water and 8 oz coffee  Meals eaten away from home: almost daily  Typical meal pattern: 1 meals per day and 0 snacks per day  Eating Behaviors: increasing protein, water, decreased caffeine    Food allergies or intolerances: none  Cultural or Presybeterian considerations: [place]    Physical Assessment  Nutrition Related Findings  Not making changes yet    Physical Activity  Types of exercise: None  Current physical limitations: none    Psychosocial Assessment   Support systems: spouse  Socioeconomic factors: works 2 [employer], [employer]  Full time in admissions, [employer]    Nutrition Diagnosis  Diagnosis: Overweight / Obesity (NC-3 3)  Related to: Physical inactivity and Excessive energy intake  As Evidenced by: BMI >25     Interventions and Teaching   Patient educated on post-op nutrition guidelines  Patient educated and handouts provided    Surgical changes to stomach / GI  Capacity of post-surgery stomach  Diet progression  Adequate hydration  Sugar and fat restriction to decrease "dumping syndrome"  Fat restriction to decrease steatorrhea  Expected weight loss  Weight loss plateaus/ possibility of weight regain  Exercise  Suggestions for pre-op diet  Nutrition considerations after surgery  Protein supplements  Meal planning and preparation  Appropriate carbohydrate, protein, and fat intake, and food/fluid choices to maximize safe weight loss, nutrient intake, and tolerance   Dietary and lifestyle changes  Possible problems with poor eating habits  Intuitive eating  Techniques for self monitoring and keeping daily food journal  Potential for food intolerance after surgery, and ways to deal with them including: lactose intolerance, nausea, reflux, vomiting, diarrhea, food intolerance, appetite changes, gas  Vitamin / Mineral supplementation of Multivitamin with minerals and Vitamin D    Education provided to: patient    Barriers to learning: No barriers identified    Readiness to change: preparation    Comprehension: verbalizes understanding     Expected Compliance: good    Goals  Eliminate sugar sweetened beverages, Food journal, Exercise 30 minutes 5 times per week, Complete lession plans 1-6 and Eat 3 meals per day     Time Spent:   30 Minutes

## 2021-03-11 ENCOUNTER — TELEPHONE (OUTPATIENT)
Dept: OBGYN CLINIC | Facility: HOSPITAL | Age: 26
End: 2021-03-11

## 2021-03-11 NOTE — TELEPHONE ENCOUNTER
Patient called to schedule  I offered first avail appt, Wed 6/9/21 in [place]  She stated that she was in the ED and they advised she has to see a rheumatologist asap  Please advise      Callback SOTO#[phone]

## 2021-03-11 NOTE — TELEPHONE ENCOUNTER
Patient states that you called her regarding an appt for tomorrow    Her insurance company is requesting to speak to you??    # [phone]

## 2021-03-15 ENCOUNTER — HOSPITAL ENCOUNTER (OUTPATIENT)
Dept: NON INVASIVE DIAGNOSTICS | Facility: CLINIC | Age: 26
Discharge: HOME | End: 2021-03-15
Payer: COMMERCIAL

## 2021-03-15 ENCOUNTER — TELEPHONE (OUTPATIENT)
Dept: CARDIOLOGY CLINIC | Facility: CLINIC | Age: 26
End: 2021-03-15

## 2021-03-15 DIAGNOSIS — Z01.810 PREOP CARDIOVASCULAR EXAM: ICD-10-CM

## 2021-03-15 DIAGNOSIS — E66.01 MORBID OBESITY (HCC): ICD-10-CM

## 2021-03-15 PROCEDURE — 93016 CV STRESS TEST SUPVJ ONLY: CPT | Performed by: INTERNAL MEDICINE

## 2021-03-15 PROCEDURE — 93017 CV STRESS TEST TRACING ONLY: CPT

## 2021-03-15 PROCEDURE — 93018 CV STRESS TEST I&R ONLY: CPT | Performed by: INTERNAL MEDICINE

## 2021-03-16 LAB
CHEST PAIN STATEMENT: NORMAL
MAX DIASTOLIC BP: 72 MMHG
MAX HEART RATE: 179 BPM
MAX PREDICTED HEART RATE: 193 BPM
MAX. SYSTOLIC BP: 154 MMHG
PROTOCOL NAME: NORMAL
TARGET HR FORMULA: NORMAL
TEST INDICATION: NORMAL
TIME IN EXERCISE PHASE: NORMAL

## 2021-03-19 ENCOUNTER — TELEPHONE (OUTPATIENT)
Dept: NEUROLOGY | Facility: CLINIC | Age: 26
End: 2021-03-19

## 2021-03-19 NOTE — TELEPHONE ENCOUNTER
Received by mail a Letter from [employer] stating there is no referral matching claims      Patient has not seen a neurology provider    Letter scanned into chart

## 2021-03-25 ENCOUNTER — OFFICE VISIT (OUTPATIENT)
Dept: BARIATRICS | Facility: CLINIC | Age: 26
End: 2021-03-25
Payer: COMMERCIAL

## 2021-03-25 VITALS
HEART RATE: 101 BPM | TEMPERATURE: 97.9 F | DIASTOLIC BLOOD PRESSURE: 92 MMHG | HEIGHT: 65 IN | WEIGHT: 280.5 LBS | BODY MASS INDEX: 46.73 KG/M2 | SYSTOLIC BLOOD PRESSURE: 140 MMHG

## 2021-03-25 DIAGNOSIS — E28.2 PCOS (POLYCYSTIC OVARIAN SYNDROME): ICD-10-CM

## 2021-03-25 DIAGNOSIS — I10 ESSENTIAL HYPERTENSION: ICD-10-CM

## 2021-03-25 DIAGNOSIS — E66.01 OBESITY, CLASS III, BMI 40-49.9 (MORBID OBESITY) (HCC): Primary | ICD-10-CM

## 2021-03-25 PROCEDURE — 99214 OFFICE O/P EST MOD 30 MIN: CPT | Performed by: PHYSICIAN ASSISTANT

## 2021-03-25 NOTE — PROGRESS NOTES
Assessment/Plan:    Diagnoses and all orders for this visit:    Obesity, Class III, BMI 40-49 9 (morbid obesity) ([facility] [address] )    Essential hypertension    PCOS (polycystic ovarian syndrome)         Interested in Vertical Sleeve Gastrectomy with Dr Jazmín Savage  10% Weight loss- weight loss is pending goal to be under 275 lbs  ; will food log and track calories daily she is already down 4 lbs since last visit 2 weeks ago  Screening labs-Completed  Support Group-Completed  Cardiac Risk Assessment-Completed  Upper Endoscopy-Completed; H  Pylori biopsy-Negative  Sleep Medicine Assessment-Not applicable  Alcohol and nicotine use is not recommended following bariatric surgery  NSAIDs should be avoided following bariatric surgery  Advised that pregnancy should be avoided following bariatric surgery for 12-18 months and should not solely rely on OCP and consider additional/alternative sources for contraception due to potential absorption issues-Completed    Patient discussed weight loss medications  Will avoid topamax as she is sexually active and not on any contraception  Discussed phentermine which she has been on in the past  Her BP is elevated today and has been uncontrolled, she has hx HTN but inconsistent with her medication (atenelol)  Explained to patient phentermine can raise her BP even more so would want her BP to be stabilized before starting her on this medication  Has trued metformin in the past for PCOS but did not like the side effects  For now will continue with conservative weight loss and food logging, tracking calories, exercise  Will see her back in 1 month but advised her to call me if having trouble losing weight  At that point can discuss with her PCP/cardiology about stabilizing her bp/getting back on bp medication and maybe starting phentermine  Patient agreeable to plan       Follow up in approximately 1 month   Goals:  Food log (ie ) [http://www  [employer],sparkpeople  com,loseit com,calorieking  com,etc]www  myfitnesspal com,sparkpeople  com,loseit com,calorieking  com,etc  baritastic  No sugary beverages  At least 64oz of water daily  Increase physical activity by 10 minutes daily  Gradually increase physical activity to a goal of 5 days per week for 30 minutes of MODERATE intensity PLUS 2 days per week of FULL BODY resistance training  Follow lessons 1-6 in the manual  Follow the 30/60 minute rule  Goal protein intake of 60-80 grams per day  Alcohol and nicotine use is not recommended following bariatric surgery  NSAIDs (Motrin, Advil, Aleve, Naproxen, ibuprofen, etc) should be avoided following bariatric surgery)    Subjective:   Chief Complaint   Patient presents with    Follow-up     Pre-Op Weight Loss Goals/Meds     Patient ID: Rosangela Thomas is a 30 y o  female with excess weight/obesity here to pursue weight management  Past Medical History:   Diagnosis Date    Hypertension     Obesity     PCOS (polycystic ovarian syndrome)      HPI:  The patient has been:  Following the lessons in the manual- yes  Practicing the 30/60 minute rule- yes  Food logging- yes  Exercise- no but will start walking daily  Alcohol- no  Tobacco- no    The following portions of the patient's history were reviewed and updated as appropriate: allergies, current medications, past family history, past medical history, past social history, past surgical history and problem list   Review of Systems   Constitutional: Negative  Respiratory: Negative  Cardiovascular: Positive for leg swelling (being evaluated by rhematology, was recently admitted to the ER For this problem)  Neurological: Negative  Psychiatric/Behavioral: Negative        Objective:  /92 (BP Location: Left arm, Patient Position: Sitting, Cuff Size: Standard)   Pulse 101   Temp 97 9 °F (36 6 °C) (Tympanic)   Ht 5' 5" (1 651 m)   Wt 127 kg (280 lb 8 oz)   BMI 46 68 kg/m²   Physical Exam  Vitals signs and nursing note reviewed  Constitutional:       Appearance: Normal appearance  She is obese  HENT:      Head: Normocephalic and atraumatic  Eyes:      Extraocular Movements: Extraocular movements intact  Pupils: Pupils are equal, round, and reactive to light  Neck:      Musculoskeletal: Normal range of motion  Cardiovascular:      Rate and Rhythm: Tachycardia present  Pulmonary:      Effort: Pulmonary effort is normal    Musculoskeletal: Normal range of motion  Skin:     General: Skin is warm and dry  Neurological:      General: No focal deficit present  Mental Status: She is alert and oriented to person, place, and time     Psychiatric:         Mood and Affect: Mood normal

## 2021-04-08 ENCOUNTER — HOSPITAL ENCOUNTER (OUTPATIENT)
Dept: RADIOLOGY | Facility: HOSPITAL | Age: 26
Discharge: HOME | End: 2021-04-08
Payer: COMMERCIAL

## 2021-04-08 ENCOUNTER — TRANSCRIBE ORDERS (OUTPATIENT)
Dept: ADMINISTRATIVE | Facility: HOSPITAL | Age: 26
End: 2021-04-08

## 2021-04-08 DIAGNOSIS — E04.1 THYROID NODULE: ICD-10-CM

## 2021-04-08 PROCEDURE — 76536 US EXAM OF HEAD AND NECK: CPT

## 2021-04-15 ENCOUNTER — OFFICE VISIT (OUTPATIENT)
Dept: BARIATRICS | Facility: CLINIC | Age: 26
End: 2021-04-15

## 2021-04-15 VITALS — HEIGHT: 65 IN | WEIGHT: 273.3 LBS | BODY MASS INDEX: 45.54 KG/M2

## 2021-04-15 DIAGNOSIS — E66.01 OBESITY, CLASS III, BMI 40-49.9 (MORBID OBESITY) (HCC): Primary | ICD-10-CM

## 2021-04-15 PROCEDURE — RECHECK

## 2021-04-15 NOTE — PROGRESS NOTES
Bariatric Follow Up Nutrition Note    Preop /4 weight checks  Preop Program    Type of surgery  Preop  Surgery Date: TBD    Surgeon: Dr Mayda Bacon  27 y o   female  Height 5' 5" (1 651 m), weight 124 kg (273 lb 4 8 oz), not currently breastfeeding  Body mass index is 45 48 kg/m²          Review of History and Medications   Past Medical History:   Diagnosis Date    Hypertension     Obesity     PCOS (polycystic ovarian syndrome)      Past Surgical History:   Procedure Laterality Date    NO PAST SURGERIES       Social History     Socioeconomic History    Marital status: /Civil Union     Spouse name: Not on file    Number of children: Not on file    Years of education: Not on file    Highest education level: Not on file   Occupational History    Not on file   Social Needs    Financial resource strain: Not on file    Food insecurity     Worry: Not on file     Inability: Not on file   [employer] needs     Medical: Not on file     Non-medical: Not on file   Tobacco Use    Smoking status: Former Smoker     Types: Cigarettes     Quit date:      Years since quittin 2    Smokeless tobacco: Never Used    Tobacco comment: patient admits to hx of 1 or 2 cigarettes a year   Substance and Sexual Activity    Alcohol use: No    Drug use: No    Sexual activity: Yes     Partners: Male     Birth control/protection: OCP   Lifestyle    Physical activity     Days per week: Not on file     Minutes per session: Not on file    Stress: Not on file   Relationships    Social connections     Talks on phone: Not on file     Gets together: Not on file     Attends Restorationist service: Not on file     Active member of club or organization: Not on file     Attends meetings of clubs or organizations: Not on file     Relationship status: Not on file    Intimate partner violence     Fear of current or ex partner: Not on file     Emotionally abused: Not on file Physically abused: Not on file     Forced sexual activity: Not on file   Other Topics Concern    Not on file   Social History Narrative    Not on file       Current Outpatient Medications:     gabapentin (NEURONTIN) 300 mg capsule, Take 1 capsule (300 mg total) by mouth daily at bedtime (Patient not taking: Reported on 3/25/2021), Disp: 30 capsule, Rfl: 0    Food Intake and Lifestyle Assessment   Food Intake Assessment completed via usual diet recall  Breakfast: [employer]  Snack: 0   Lunch: grapes with cheese or yogurt parfait  Snack: 0  Dinner: cooks for  doesn't eat it-frozen healthy dinner 23-29 gm protein  Snack: 0  Beverage intake: water, sweetened beverages and coffee/tea  Diet texture/stage: regular  Protein supplement: 0  Estimated protein intake per day: 60 gm  Estimated fluid intake per day: 64 oz water and 8 oz coffee  Meals eaten away from home: almost daily  Typical meal pattern: 1 meals per day and 0 snacks per day  Eating Behaviors: increasing protein, water, decreased caffeine    Food allergies or intolerances: none  Cultural or Anglican considerations: [place]    Physical Assessment  Nutrition Related Findings  Not making changes yet    Physical Activity  Types of exercise: None  Current physical limitations: none    Psychosocial Assessment   Support systems: spouse  Socioeconomic factors: works 2 [employer], [employer]  Full time in admissions, [employer]    Nutrition Diagnosis  Diagnosis: Overweight / Obesity (NC-3 3)  Related to: Physical inactivity and Excessive energy intake  As Evidenced by: BMI >25     Interventions and Teaching   Patient educated on post-op nutrition guidelines  Patient educated and handouts provided    Surgical changes to stomach / GI  Capacity of post-surgery stomach  Diet progression  Adequate hydration  Sugar and fat restriction to decrease "dumping syndrome"  Fat restriction to decrease steatorrhea  Expected weight loss  Weight loss plateaus/ possibility of weight regain  Exercise  Suggestions for pre-op diet  Nutrition considerations after surgery  Protein supplements  Meal planning and preparation  Appropriate carbohydrate, protein, and fat intake, and food/fluid choices to maximize safe weight loss, nutrient intake, and tolerance   Dietary and lifestyle changes  Possible problems with poor eating habits  Intuitive eating  Techniques for self monitoring and keeping daily food journal  Potential for food intolerance after surgery, and ways to deal with them including: lactose intolerance, nausea, reflux, vomiting, diarrhea, food intolerance, appetite changes, gas  Vitamin / Mineral supplementation of Multivitamin with minerals and Vitamin D    Education provided to: patient    Barriers to learning: No barriers identified    Readiness to change: preparation    Comprehension: verbalizes understanding     Expected Compliance: good    Goals  Eliminate sugar sweetened beverages, Food journal, Exercise 30 minutes 5 times per week, Complete lession plans 1-6 and Eat 3 meals per day     Time Spent:   30 Minutes

## 2021-05-20 ENCOUNTER — OFFICE VISIT (OUTPATIENT)
Dept: BARIATRICS | Facility: CLINIC | Age: 26
End: 2021-05-20

## 2021-05-20 VITALS — BODY MASS INDEX: 45.52 KG/M2 | WEIGHT: 273.2 LBS | HEIGHT: 65 IN

## 2021-05-20 DIAGNOSIS — E66.01 OBESITY, CLASS III, BMI 40-49.9 (MORBID OBESITY) (HCC): Primary | ICD-10-CM

## 2021-05-20 PROCEDURE — RECHECK

## 2021-05-20 NOTE — PROGRESS NOTES
Bariatric Follow Up Nutrition Note    Preop /4 weight checks  Preop Program    Type of surgery  Preop  Surgery Date: TBD    Surgeon: Dr Kylie Meng  27 y o   female  Height 5' 5" (1 651 m), weight 124 kg (273 lb 3 2 oz), not currently breastfeeding  Body mass index is 45 46 kg/m²          Review of History and Medications   Past Medical History:   Diagnosis Date    Hypertension     Obesity     PCOS (polycystic ovarian syndrome)      Past Surgical History:   Procedure Laterality Date    NO PAST SURGERIES       Social History     Socioeconomic History    Marital status: /Civil Union     Spouse name: Not on file    Number of children: Not on file    Years of education: Not on file    Highest education level: Not on file   Occupational History    Not on file   Social Needs    Financial resource strain: Not on file    Food insecurity     Worry: Not on file     Inability: Not on file   [employer] needs     Medical: Not on file     Non-medical: Not on file   Tobacco Use    Smoking status: Former Smoker     Types: Cigarettes     Quit date:      Years since quittin 3    Smokeless tobacco: Never Used    Tobacco comment: patient admits to hx of 1 or 2 cigarettes a year   Substance and Sexual Activity    Alcohol use: No    Drug use: No    Sexual activity: Yes     Partners: Male     Birth control/protection: OCP   Lifestyle    Physical activity     Days per week: Not on file     Minutes per session: Not on file    Stress: Not on file   Relationships    Social connections     Talks on phone: Not on file     Gets together: Not on file     Attends Restoration service: Not on file     Active member of club or organization: Not on file     Attends meetings of clubs or organizations: Not on file     Relationship status: Not on file    Intimate partner violence     Fear of current or ex partner: Not on file     Emotionally abused: Not on file Physically abused: Not on file     Forced sexual activity: Not on file   Other Topics Concern    Not on file   Social History Narrative    Not on file       Current Outpatient Medications:     gabapentin (NEURONTIN) 300 mg capsule, Take 1 capsule (300 mg total) by mouth daily at bedtime (Patient not taking: Reported on 3/25/2021), Disp: 30 capsule, Rfl: 0    Food Intake and Lifestyle Assessment   Food Intake Assessment completed via usual diet recall  Breakfast: [employer] or grapes, cheese and cracker or almonds  Snack: 0   Lunch: grapes with cheese or yogurt parfait  Snack: 0  Dinner: grilled chicken, if sandwich only 1 piece of bread  Snack: 0  Beverage intake: water, and coffee/tea  Diet texture/stage: regular  Protein supplement: 0  Estimated protein intake per day: 60 gm  Estimated fluid intake per day: 64 oz water and 8 oz coffee  Meals eaten away from home: almost daily  Typical meal pattern: 1 meals per day and 0 snacks per day  Eating Behaviors: increasing protein, water, decreased caffeine    Food allergies or intolerances: none  Cultural or Yazdanism considerations: [place]    Physical Assessment  Nutrition Related Findings  Not making changes yet    Physical Activity  Types of exercise: None-trying to move more  Current physical limitations: none    Psychosocial Assessment   Support systems: spouse  Socioeconomic factors: works 2 jobs-real estate, [employer]  Full time in admissions, [employer]     Nutrition Diagnosis  Diagnosis: Overweight / Obesity (NC-3 3)  Related to: Physical inactivity and Excessive energy intake  As Evidenced by: BMI >25     Interventions and Teaching   Patient educated on post-op nutrition guidelines  Patient educated and handouts provided    Surgical changes to stomach / GI  Capacity of post-surgery stomach  Diet progression  Adequate hydration  Sugar and fat restriction to decrease "dumping syndrome"  Fat restriction to decrease steatorrhea  Expected weight loss  Weight loss plateaus/ possibility of weight regain  Exercise  Suggestions for pre-op diet  Nutrition considerations after surgery  Protein supplements  Meal planning and preparation  Appropriate carbohydrate, protein, and fat intake, and food/fluid choices to maximize safe weight loss, nutrient intake, and tolerance   Dietary and lifestyle changes  Possible problems with poor eating habits  Intuitive eating  Techniques for self monitoring and keeping daily food journal  Potential for food intolerance after surgery, and ways to deal with them including: lactose intolerance, nausea, reflux, vomiting, diarrhea, food intolerance, appetite changes, gas  Vitamin / Mineral supplementation of Multivitamin with minerals and Vitamin D    Education provided to: patient    Barriers to learning: No barriers identified    Readiness to change: preparation    Comprehension: verbalizes understanding     Expected Compliance: good    Goals  Eliminate sugar sweetened beverages, Food journal, Exercise 30 minutes 5 times per week, Complete lession plans 1-6 and Eat 3 meals per day     Time Spent:   30 Minutes

## 2021-06-09 ENCOUNTER — OFFICE VISIT (OUTPATIENT)
Dept: RHEUMATOLOGY | Facility: CLINIC | Age: 26
End: 2021-06-09
Payer: COMMERCIAL

## 2021-06-09 VITALS
DIASTOLIC BLOOD PRESSURE: 84 MMHG | BODY MASS INDEX: 45.48 KG/M2 | SYSTOLIC BLOOD PRESSURE: 130 MMHG | WEIGHT: 273 LBS | HEIGHT: 65 IN

## 2021-06-09 DIAGNOSIS — R76.8 ABNORMAL ANCA TEST: ICD-10-CM

## 2021-06-09 DIAGNOSIS — M79.605 PAIN IN BOTH LOWER EXTREMITIES: Primary | ICD-10-CM

## 2021-06-09 DIAGNOSIS — M79.604 PAIN IN BOTH LOWER EXTREMITIES: Primary | ICD-10-CM

## 2021-06-09 DIAGNOSIS — E55.9 VITAMIN D DEFICIENCY: ICD-10-CM

## 2021-06-09 PROCEDURE — 99244 OFF/OP CNSLTJ NEW/EST MOD 40: CPT | Performed by: INTERNAL MEDICINE

## 2021-06-09 RX ORDER — GABAPENTIN 300 MG/1
900 CAPSULE ORAL
Qty: 90 CAPSULE | Refills: 3 | Status: SHIPPED | OUTPATIENT
Start: 2021-06-09 | End: 2021-09-24

## 2021-06-09 RX ORDER — METHYLPREDNISOLONE 4 MG/1
TABLET ORAL
Qty: 21 TABLET | Refills: 0 | Status: SHIPPED | OUTPATIENT
Start: 2021-06-09 | End: 2021-09-24

## 2021-06-09 NOTE — PROGRESS NOTES
Assessment and Plan: Sheela Rodriguez is a 27 y o   female who presents as a Rheumatology consult referred by Holden Priest,Mary for evaluation of possible ANCA vasculitis  Patient has history of a positive PR3 at 7 5; however, her C ANCA, p-ANCA, and MPO antibodies were all negative  Her main complaint is leg swelling and pain, especially left leg pain  Meloxicam, gabapentin, and prednisone have not helped her leg pain  Increased patient's gabapentin to 900 mg p o  q h s  Below autoimmune disease workup ordered along with repeat ANCA  Repeat ANCA returned negative; it doesn't seem like she has an actual vasculitis  Bilateral lower extremity EMG also ordered to evaluate for a myopathic or neuropathic process  She does have vitamin-D deficiency, for which she was prescribed ergocalciferol 11471 units p o  weekly  If high-dose gabapentin does not help, she is to start Medrol dose pack, which should help if her symptoms were autoimmune disease related  Plan:  Diagnoses and all orders for this visit:    Pain in both lower extremities  -     gabapentin (NEURONTIN) 300 mg capsule; Take 3 capsules (900 mg total) by mouth daily at bedtime  -     EMG 2 limb lower extremity; Future  -     Comprehensive metabolic panel  -     C-reactive protein  -     Sedimentation rate, automated  -     CBC and differential  -     TSH, 3rd generation with Free T4 reflex  -     ANCA Screen With MPO and PR3 With Reflex To ANCA Titer  -     Aldolase  -     CK  -     Chronic Hepatitis Panel  -     Vitamin D 25 hydroxy  -     methylPREDNISolone 4 MG tablet therapy pack; Use as directed on package    Abnormal ANCA test  -     Urinalysis with microscopic  -     Protein / creatinine ratio, urine    Vitamin D deficiency  -     ergocalciferol (VITAMIN D2) 50,000 units;  Take 1 capsule (50,000 Units total) by mouth once a week    Follow-up plan: Return to clinic in 3 months        HPI  Sheela Rodriguez is a 27 y o   female who presents as a Rheumatology consult referred by Vicenta Ott,* for evaluation of possible ANCA vasculitis  Patient had a recent PR3 antibody that was slightly positive at 7 5  She states that she got admitted in March with lower extremity pain and swelling  Has been having pain in her heels  Can't firmly step on floor due to centralized pain  If stands on feet a long time, tends to get ankle swelling  In March, back of knees got swollen and legs got heavy; left leg pain worse than right  Prednisone, meloxicam, and gabapentin did not help leg pan  Review of Systems  Review of Systems   Constitutional: Negative for chills, fatigue, fever and unexpected weight change  HENT: Negative for mouth sores and trouble swallowing  Eyes: Negative for pain and visual disturbance  Respiratory: Negative for cough and shortness of breath  Cardiovascular: Positive for leg swelling  Negative for chest pain  Gastrointestinal: Negative for abdominal pain, blood in stool, constipation, diarrhea and nausea  Musculoskeletal: Positive for arthralgias  Negative for back pain, joint swelling and myalgias  Skin: Negative for color change and rash  Neurological: Positive for numbness  Negative for weakness  Hematological: Negative for adenopathy  Psychiatric/Behavioral: Negative for sleep disturbance  Reviewed and agree      Allergies  No Known Allergies    Home Medications    Current Outpatient Medications:     ergocalciferol (VITAMIN D2) 50,000 units, Take 1 capsule (50,000 Units total) by mouth once a week, Disp: 12 capsule, Rfl: 2    gabapentin (NEURONTIN) 300 mg capsule, Take 3 capsules (900 mg total) by mouth daily at bedtime, Disp: 90 capsule, Rfl: 3    Junel FE 1/20 1-20 MG-MCG per tablet, , Disp: , Rfl:     loratadine (CLARITIN) 10 mg tablet, Take 10 mg by mouth daily, Disp: , Rfl:     meloxicam (MOBIC) 15 mg tablet, Take 15 mg by mouth daily as needed, Disp: , Rfl:     methylPREDNISolone 4 MG tablet therapy pack, Use as directed on package, Disp: 21 tablet, Rfl: 0    montelukast (SINGULAIR) 10 mg tablet, , Disp: , Rfl:     spironolactone (ALDACTONE) 25 mg tablet, Take 25 mg by mouth daily, Disp: , Rfl:     traMADol (ULTRAM) 50 mg tablet, TAKE 1 TABLET BY MOUTH FOUR TIMES A DAY AS NEEDED FOR MODERATE TO SEVERE PAIN, Disp: , Rfl:     Past Medical History  Past Medical History:   Diagnosis Date    Hypertension     Obesity     PCOS (polycystic ovarian syndrome)        Past Surgical History   Past Surgical History:   Procedure Laterality Date    NO PAST SURGERIES         Family History    Family History   Problem Relation Age of Onset    Thyroid disease Mother     Sarcoidosis Mother     Hypertension Father     Diabetes Father     Hyperlipidemia Father     Cancer Neg Hx     Stroke Neg Hx    mother - sarcoidosis, psoriasis  Brother - Lyme disease, possible inflammatory ar      Social History  Occupation: office work and real estate  Social History     Substance and Sexual Activity   Alcohol Use No     Social History     Substance and Sexual Activity   Drug Use No     Social History     Tobacco Use   Smoking Status Never Smoker   Smokeless Tobacco Former User   Tobacco Comment    patient admits to hx of 1 or 2 cigarettes a year  Used hookah       Objective:  Vitals:    06/09/21 1313   BP: 130/84   BP Location: Left arm   Patient Position: Sitting   Cuff Size: Large   Weight: 124 kg (273 lb)   Height: 5' 5" (1 651 m)       Physical Exam  Constitutional:       General: She is not in acute distress  Appearance: She is well-developed  HENT:      Head: Normocephalic and atraumatic  Eyes:      General: Lids are normal  No scleral icterus  Conjunctiva/sclera: Conjunctivae normal    Neck:      Thyroid: No thyromegaly  Cardiovascular:      Rate and Rhythm: Normal rate and regular rhythm  Heart sounds: S1 normal and S2 normal  No murmur heard  No friction rub     Pulmonary:      Effort: Pulmonary effort is normal  No tachypnea or respiratory distress  Breath sounds: Normal breath sounds  No wheezing, rhonchi or rales  Musculoskeletal:         General: Swelling and tenderness present  Cervical back: Neck supple  No muscular tenderness  Comments: Bilateral leg swelling; left distal lower extremity and ankle tenderness   Lymphadenopathy:      Head:      Right side of head: No submental or submandibular adenopathy  Left side of head: No submental or submandibular adenopathy  Cervical: No cervical adenopathy  Skin:     General: Skin is warm and dry  Findings: No rash  Nails: There is no clubbing  Neurological:      Mental Status: She is alert  Sensory: No sensory deficit  Psychiatric:         Behavior: Behavior normal  Behavior is cooperative  Reviewed labs and imaging  Imaging:    Lumbar spine MRI 03/04/2021   L5-S1:  Tiny central annular fissure and protrusion  Patent neural foramina  Prominent epidural fat results in circumferential tapering of the thecal sac      IMPRESSION:     Mild noncompressive degenerative discogenic disease at L5-S1      Prominent epidural fat at L5-S1 results in developmental tapering of the thecal sac  Labs:   Office Visit on 06/09/2021   Component Date Value Ref Range Status    Sodium 06/11/2021 137  136 - 145 mmol/L Final    Potassium 06/11/2021 3 6  3 5 - 5 3 mmol/L Final    Chloride 06/11/2021 106  100 - 108 mmol/L Final    CO2 06/11/2021 25  21 - 32 mmol/L Final    ANION GAP 06/11/2021 6  4 - 13 mmol/L Final    BUN 06/11/2021 11  5 - 25 mg/dL Final    Creatinine 06/11/2021 0 85  0 60 - 1 30 mg/dL Final    Standardized to IDMS reference method    Glucose 06/11/2021 71  65 - 140 mg/dL Final    If the patient is fasting, the ADA then defines impaired fasting glucose as > 100 mg/dL and diabetes as > or equal to 123 mg/dL    Specimen collection should occur prior to Sulfasalazine administration due to the potential for falsely depressed results  Specimen collection should occur prior to Sulfapyridine administration due to the potential for falsely elevated results   Calcium 06/11/2021 9 4  8 3 - 10 1 mg/dL Final    AST 06/11/2021 16  5 - 45 U/L Final    Specimen collection should occur prior to Sulfasalazine administration due to the potential for falsely depressed results   ALT 06/11/2021 37  12 - 78 U/L Final    Specimen collection should occur prior to Sulfasalazine and/or Sulfapyridine administration due to the potential for falsely depressed results   Alkaline Phosphatase 06/11/2021 89  46 - 116 U/L Final    Total Protein 06/11/2021 8 3* 6 4 - 8 2 g/dL Final    Albumin 06/11/2021 3 6  3 5 - 5 0 g/dL Final    Total Bilirubin 06/11/2021 0 43  0 20 - 1 00 mg/dL Final    Use of this assay is not recommended for patients undergoing treatment with eltrombopag due to the potential for falsely elevated results      eGFR 06/11/2021 94  ml/min/1 73sq m Final    CRP 06/11/2021 34 1* <3 0 mg/L Final    Sed Rate 06/11/2021 72* 0 - 19 mm/hour Final    WBC 06/11/2021 8 76  4 31 - 10 16 Thousand/uL Final    RBC 06/11/2021 5 05  3 81 - 5 12 Million/uL Final    Hemoglobin 06/11/2021 13 3  11 5 - 15 4 g/dL Final    Hematocrit 06/11/2021 42 4  34 8 - 46 1 % Final    MCV 06/11/2021 84  82 - 98 fL Final    MCH 06/11/2021 26 3* 26 8 - 34 3 pg Final    MCHC 06/11/2021 31 4  31 4 - 37 4 g/dL Final    RDW 06/11/2021 13 0  11 6 - 15 1 % Final    MPV 06/11/2021 10 8  8 9 - 12 7 fL Final    Platelets 21/64/6177 330  149 - 390 Thousands/uL Final    nRBC 06/11/2021 0  /100 WBCs Final    Neutrophils Relative 06/11/2021 61  43 - 75 % Final    Immat GRANS % 06/11/2021 0  0 - 2 % Final    Lymphocytes Relative 06/11/2021 27  14 - 44 % Final    Monocytes Relative 06/11/2021 6  4 - 12 % Final    Eosinophils Relative 06/11/2021 5  0 - 6 % Final    Basophils Relative 06/11/2021 1  0 - 1 % Final    Neutrophils Absolute 06/11/2021 5 37  1 85 - 7 62 Thousands/µL Final    Immature Grans Absolute 06/11/2021 0 03  0 00 - 0 20 Thousand/uL Final    Lymphocytes Absolute 06/11/2021 2 32  0 60 - 4 47 Thousands/µL Final    Monocytes Absolute 06/11/2021 0 52  0 17 - 1 22 Thousand/µL Final    Eosinophils Absolute 06/11/2021 0 46  0 00 - 0 61 Thousand/µL Final    Basophils Absolute 06/11/2021 0 06  0 00 - 0 10 Thousands/µL Final    TSH 3RD GENERATON 06/11/2021 1 880  0 358 - 3 740 uIU/mL Final    The recommended reference ranges for TSH during pregnancy are as follows:   First trimester 0 1 to 2 5 uIU/mL   Second trimester  0 2 to 3 0 uIU/mL   Third trimester 0 3 to 3 0 uIU/m    Note: Normal ranges may not apply to patients who are transgender, non-binary, or whose legal sex, sex at birth, and gender identity differ      Miscellaneous Lab Test Result 06/11/2021 SEE WRITTEN REPORT   Final    Aldolase 06/11/2021 7 6  3 3 - 10 3 U/L Final    Total CK 06/11/2021 84  26 - 192 U/L Final    Hepatitis B Surface Ag 06/11/2021 Non-reactive  Non-reactive, NonReactive - Confirmed Final    Hepatitis C Ab 06/11/2021 Non-reactive  Non-reactive Final    Hep B C IgM 06/11/2021 Non-reactive  Non-reactive Final    Hep B Core Total Ab 06/11/2021 Non-reactive  Non-reactive Final    Vit D, 25-Hydroxy 06/11/2021 7 9* 30 0 - 100 0 ng/mL Final    Clarity, UA 06/11/2021 Clear   Final    Color, UA 06/11/2021 Dk Yellow   Final    Specific [place], UA 06/11/2021 1 026  1 003 - 1 030 Final    pH, UA 06/11/2021 6 0  4 5, 5 0, 5 5, 6 0, 6 5, 7 0, 7 5, 8 0 Final    Glucose, UA 06/11/2021 Negative  Negative mg/dl Final    Ketones, UA 06/11/2021 Trace* Negative mg/dl Final    Blood, UA 06/11/2021 Negative  Negative Final    Protein, UA 06/11/2021 Negative  Negative mg/dl Final    Nitrite, UA 06/11/2021 Negative  Negative Final    Bilirubin, UA 06/11/2021 Interference- unable to analyze* Negative Final    The dipstick result may be falsely positive due to interfering substances  We recommend reliance upon serum bilirubin, liver & kidney function tests to guide patient care if clinically indicated   Urobilinogen, UA 06/11/2021 1 0  0 2, 1 0 E U /dl E U /dl Final    Leukocytes, UA 06/11/2021 Negative  Negative Final    WBC, UA 06/11/2021 None Seen  None Seen, 2-4 /hpf Final    RBC, UA 06/11/2021 None Seen  None Seen, 2-4 /hpf Final    Bacteria, UA 06/11/2021 Occasional  None Seen, Occasional /hpf Final    Epithelial Cells 06/11/2021 None Seen  None Seen, Occasional /hpf Final    MUCUS THREADS 06/11/2021 Occasional* None Seen Final    Creatinine, Ur 06/11/2021 327 0  mg/dL Final    Protein Urine Random 06/11/2021 15  mg/dL Final    Prot/Creat Ratio, Ur 06/11/2021 0 05  0 00 - 0 10 Final   Hospital Outpatient Visit on 03/15/2021   Component Date Value Ref Range Status    Protocol Name 03/15/2021 [facility]   Final    Time In Exercise Phase 03/15/2021 00:08:00   Final    MAX   SYSTOLIC BP 08/21/4351 474  mmHg Final    Max Diastolic Bp 67/80/1234 72  mmHg Final    Max Heart Rate 03/15/2021 179  BPM Final    Max Predicted Heart Rate 03/15/2021 193  BPM Final    Reason for Termination 03/15/2021    Final                    Value:Dyspnea  Fatigue      Test Indication 03/15/2021 Pre-Op Evaluation   Final    Target Hr Formular 03/15/2021 (220 - Age)*100%   Final    Chest Pain Statement 03/15/2021 none   Final   Admission on 03/04/2021, Discharged on 03/06/2021   Component Date Value Ref Range Status    Sodium 03/03/2021 135* 136 - 145 mmol/L Final    Potassium 03/03/2021 3 3* 3 5 - 5 3 mmol/L Final    Chloride 03/03/2021 101  100 - 108 mmol/L Final    CO2 03/03/2021 26  21 - 32 mmol/L Final    ANION GAP 03/03/2021 8  4 - 13 mmol/L Final    BUN 03/03/2021 13  5 - 25 mg/dL Final    Creatinine 03/03/2021 0 79  0 60 - 1 30 mg/dL Final    Standardized to IDMS reference method    Glucose 03/03/2021 127  65 - 140 mg/dL Final    If the patient is fasting, the ADA then defines impaired fasting glucose as > 100 mg/dL and diabetes as > or equal to 123 mg/dL  Specimen collection should occur prior to Sulfasalazine administration due to the potential for falsely depressed results  Specimen collection should occur prior to Sulfapyridine administration due to the potential for falsely elevated results   Calcium 03/03/2021 8 8  8 3 - 10 1 mg/dL Final    Corrected Calcium 03/03/2021 9 3  8 3 - 10 1 mg/dL Final    AST 03/03/2021 21  5 - 45 U/L Final    Specimen collection should occur prior to Sulfasalazine administration due to the potential for falsely depressed results   ALT 03/03/2021 46  12 - 78 U/L Final    Specimen collection should occur prior to Sulfasalazine administration due to the potential for falsely depressed results   Alkaline Phosphatase 03/03/2021 95  46 - 116 U/L Final    Total Protein 03/03/2021 8 2  6 4 - 8 2 g/dL Final    Albumin 03/03/2021 3 4* 3 5 - 5 0 g/dL Final    Total Bilirubin 03/03/2021 0 41  0 20 - 1 00 mg/dL Final    Use of this assay is not recommended for patients undergoing treatment with eltrombopag due to the potential for falsely elevated results   eGFR 03/03/2021 103  ml/min/1 73sq m Final    Troponin I 03/03/2021 <0 02  <=0 04 ng/mL Final    [employer] Chemistry analyzer 99% cutoff is > 0 04 ng/mL in network labs     o cTnI 99% cutoff is useful only when applied to patients in the clinical setting of myocardial ischemia   o cTnI 99% cutoff should be interpreted in the context of clinical history, ECG findings and possibly cardiac imaging to establish correct diagnosis  o cTnI 99% cutoff may be suggestive but clearly not indicative of a coronary event without the clinical setting of myocardial ischemia        WBC 03/04/2021 11 26* 4 31 - 10 16 Thousand/uL Final    RBC 03/04/2021 4 93  3 81 - 5 12 Million/uL Final    Hemoglobin 03/04/2021 12 9  11 5 - 15 4 g/dL Final    Hematocrit 03/04/2021 39 9  34 8 - 46 1 % Final    MCV 03/04/2021 81* 82 - 98 fL Final    MCH 03/04/2021 26 2* 26 8 - 34 3 pg Final    MCHC 03/04/2021 32 3  31 4 - 37 4 g/dL Final    RDW 03/04/2021 13 1  11 6 - 15 1 % Final    MPV 03/04/2021 10 5  8 9 - 12 7 fL Final    Platelets 63/70/4680 324  149 - 390 Thousands/uL Final    nRBC 03/04/2021 0  /100 WBCs Final    Neutrophils Relative 03/04/2021 56  43 - 75 % Final    Immat GRANS % 03/04/2021 0  0 - 2 % Final    Lymphocytes Relative 03/04/2021 30  14 - 44 % Final    Monocytes Relative 03/04/2021 9  4 - 12 % Final    Eosinophils Relative 03/04/2021 4  0 - 6 % Final    Basophils Relative 03/04/2021 1  0 - 1 % Final    Neutrophils Absolute 03/04/2021 6 34  1 85 - 7 62 Thousands/µL Final    Immature Grans Absolute 03/04/2021 0 05  0 00 - 0 20 Thousand/uL Final    Lymphocytes Absolute 03/04/2021 3 36  0 60 - 4 47 Thousands/µL Final    Monocytes Absolute 03/04/2021 1 02  0 17 - 1 22 Thousand/µL Final    Eosinophils Absolute 03/04/2021 0 43  0 00 - 0 61 Thousand/µL Final    Basophils Absolute 03/04/2021 0 06  0 00 - 0 10 Thousands/µL Final    Protime 03/04/2021 13 4  11 6 - 14 5 seconds Final    INR 03/04/2021 1 01  0 84 - 1 19 Final    PTT 03/04/2021 30  23 - 37 seconds Final    Therapeutic Heparin Range =  60-90 seconds    Blood Culture 03/04/2021 No Growth After 5 Days  Final    Blood Culture 03/04/2021 No Growth After 5 Days  Final    Preg, Serum 03/03/2021 Negative  Negative Final    TSH 3RD GENERATON 03/03/2021 2 998  0 358 - 3 740 uIU/mL Final    The recommended reference ranges for TSH during pregnancy are as follows:   First trimester 0 1 to 2 5 uIU/mL   Second trimester  0 2 to 3 0 uIU/mL   Third trimester 0 3 to 3 0 uIU/m    Note: Normal ranges may not apply to patients who are transgender, non-binary, or whose legal sex, sex at birth, and gender identity differ      NT-proBNP 03/03/2021 6  <125 pg/mL Final    D-Dimer, Quant 03/04/2021 <0 27  <0 50 ug/ml FEU Final Reference and upper limits to exclude DVT and PE are the same  Do not use to exclude if clinical symptoms are present  Pregnant women:  1st trimester:  <0 22 - 1 06 ug/ml FEU  2nd trimester:  <0 22 - 1 88 ug/ml FEU  3rd trimester:   0 24 - 3 28 ug/ml FEU    Note: Normal ranges may not apply to patients who are transgender, non-binary, or whose legal sex, sex at birth, and gender identity differ        Total CK 03/03/2021 138  26 - 192 U/L Final    LACTIC ACID 03/04/2021 1 4  0 5 - 2 0 mmol/L Final    CRP, High Sensitivity 03/03/2021 42 32  mg/L Final    Magnesium 03/03/2021 2 0  1 6 - 2 6 mg/dL Final    Color, UA 03/04/2021 Yellow   Final    Clarity, UA 03/04/2021 Clear   Final    Specific Gravity, UA 03/04/2021 1 020  1 003 - 1 030 Final    pH, UA 03/04/2021 7 5  4 5, 5 0, 5 5, 6 0, 6 5, 7 0, 7 5, 8 0 Final    Leukocytes, UA 03/04/2021 Negative  Negative Final    Nitrite, UA 03/04/2021 Negative  Negative Final    Protein, UA 03/04/2021 Negative  Negative mg/dl Final    Glucose, UA 03/04/2021 Negative  Negative mg/dl Final    Ketones, UA 03/04/2021 Negative  Negative mg/dl Final    Urobilinogen, UA 03/04/2021 0 2  0 2, 1 0 E U /dl E U /dl Final    Bilirubin, UA 03/04/2021 Negative  Negative Final    Blood, UA 03/04/2021 Negative  Negative Final    CK-MB Index 03/03/2021 1 7  0 0 - 2 5 % Final    CK-MB 03/03/2021 2 4  0 0 - 5 0 ng/mL Final    MERCED 03/04/2021 Negative  Negative Final    Sodium 03/04/2021 136  136 - 145 mmol/L Final    Potassium 03/04/2021 3 8  3 5 - 5 3 mmol/L Final    Chloride 03/04/2021 102  100 - 108 mmol/L Final    CO2 03/04/2021 26  21 - 32 mmol/L Final    ANION GAP 03/04/2021 8  4 - 13 mmol/L Final    BUN 03/04/2021 11  5 - 25 mg/dL Final    Creatinine 03/04/2021 0 72  0 60 - 1 30 mg/dL Final    Standardized to IDMS reference method    Glucose 03/04/2021 168* 65 - 140 mg/dL Final    If the patient is fasting, the ADA then defines impaired fasting glucose as > 100 mg/dL and diabetes as > or equal to 123 mg/dL  Specimen collection should occur prior to Sulfasalazine administration due to the potential for falsely depressed results  Specimen collection should occur prior to Sulfapyridine administration due to the potential for falsely elevated results      Calcium 03/04/2021 8 7  8 3 - 10 1 mg/dL Final    eGFR 03/04/2021 115  ml/min/1 73sq m Final    WBC 03/04/2021 9 66  4 31 - 10 16 Thousand/uL Final    RBC 03/04/2021 4 64  3 81 - 5 12 Million/uL Final    Hemoglobin 03/04/2021 12 5  11 5 - 15 4 g/dL Final    Hematocrit 03/04/2021 39 0  34 8 - 46 1 % Final    MCV 03/04/2021 84  82 - 98 fL Final    MCH 03/04/2021 26 9  26 8 - 34 3 pg Final    MCHC 03/04/2021 32 1  31 4 - 37 4 g/dL Final    RDW 03/04/2021 13 0  11 6 - 15 1 % Final    MPV 03/04/2021 10 6  8 9 - 12 7 fL Final    Platelets 60/23/1480 303  149 - 390 Thousands/uL Final    nRBC 03/04/2021 0  /100 WBCs Final    Neutrophils Relative 03/04/2021 58  43 - 75 % Final    Immat GRANS % 03/04/2021 0  0 - 2 % Final    Lymphocytes Relative 03/04/2021 30  14 - 44 % Final    Monocytes Relative 03/04/2021 7  4 - 12 % Final    Eosinophils Relative 03/04/2021 4  0 - 6 % Final    Basophils Relative 03/04/2021 1  0 - 1 % Final    Neutrophils Absolute 03/04/2021 5 66  1 85 - 7 62 Thousands/µL Final    Immature Grans Absolute 03/04/2021 0 04  0 00 - 0 20 Thousand/uL Final    Lymphocytes Absolute 03/04/2021 2 85  0 60 - 4 47 Thousands/µL Final    Monocytes Absolute 03/04/2021 0 64  0 17 - 1 22 Thousand/µL Final    Eosinophils Absolute 03/04/2021 0 41  0 00 - 0 61 Thousand/µL Final    Basophils Absolute 03/04/2021 0 06  0 00 - 0 10 Thousands/µL Final    Platelets 72/62/2625 303  149 - 390 Thousands/uL Final    MPV 03/04/2021 10 6  8 9 - 12 7 fL Final    Ventricular Rate 03/04/2021 92  BPM Final    Atrial Rate 03/04/2021 92  BPM Final    NM Interval 03/04/2021 150  ms Final    QRSD Interval 03/04/2021 80  ms Final  QT Interval 03/04/2021 344  ms Final    QTC Interval 03/04/2021 425  ms Final    P Axis 03/04/2021 14  degrees Final    QRS Axis 03/04/2021 31  degrees Final    T Wave [place] 03/04/2021 7  degrees Final    Sed Rate 03/05/2021 60* 0 - 19 mm/hour Final    Rheumatoid Factor 03/05/2021 Negative  Negative Final    CRP 03/05/2021 24 7* <3 0 mg/L Final    C-ANCA 03/05/2021 <1:20  Neg:<1:20 titer Final    Atypical pANCA 03/05/2021 <1:20  Neg:<1:20 titer Final    The atypical pANCA pattern has been observed in a significant  percentage of patients with ulcerative colitis, primary sclerosing  cholangitis and autoimmune hepatitis   MPO AB 03/05/2021 <9 0  0 0 - 9 0 U/mL Final    RI-3 AB 03/05/2021 7 5* 0 0 - 3 5 U/mL Final    P-ANCA 03/05/2021 <1:20  Neg:<1:20 titer Final    The presence of positive fluorescence exhibiting P-ANCA or C-ANCA  patterns alone is not specific for the diagnosis of Wegener's  Granulomatosis (WG) or microscopic polyangiitis  Decisions about  treatment should not be based solely on ANCA IFA results  The  International ANCA Group Consensus recommends follow up testing of  positive sera with both RI-3 and MPO-ANCA enzyme immunoassays  As  many as 5% serum samples are positive only by EIA  Ref  AM J Clin Pathol 1999;111:507-513   Angio Convert Enzyme 03/05/2021 20  14 - 82 U/L Final    Lyme total antibody 03/05/2021 44  0 00 - 119 Final    Negative (0-119) Absence of detectable Borrelia burgdoferi Antibodies  A negative result does not exclude the possibility of Borrelia infection  If early Lyme disease is suspected,a second sample should be collected & tested 4 weeks after initial testing       Scleroderma SCL-70 03/05/2021 <0 2  0 0 - 0 9 AI Final    Anti-Centromere B Antibodies 03/05/2021 <0 2  0 0 - 0 9 AI Final    C3 Complement 03/05/2021 144 0  90 0 - 180 0 mg/dL Final    C4, COMPLEMENT 03/05/2021 35 0  10 0 - 40 0 mg/dL Final    Sodium 03/05/2021 135* 136 - 145 mmol/L Final    Potassium 03/05/2021 4 1  3 5 - 5 3 mmol/L Final    Chloride 03/05/2021 103  100 - 108 mmol/L Final    CO2 03/05/2021 22  21 - 32 mmol/L Final    ANION GAP 03/05/2021 10  4 - 13 mmol/L Final    BUN 03/05/2021 11  5 - 25 mg/dL Final    Creatinine 03/05/2021 0 67  0 60 - 1 30 mg/dL Final    Standardized to IDMS reference method    Glucose 03/05/2021 128  65 - 140 mg/dL Final    If the patient is fasting, the ADA then defines impaired fasting glucose as > 100 mg/dL and diabetes as > or equal to 123 mg/dL  Specimen collection should occur prior to Sulfasalazine administration due to the potential for falsely depressed results  Specimen collection should occur prior to Sulfapyridine administration due to the potential for falsely elevated results   Glucose, Fasting 03/05/2021 128* 65 - 99 mg/dL Final    Specimen collection should occur prior to Sulfasalazine administration due to the potential for falsely depressed results  Specimen collection should occur prior to Sulfapyridine administration due to the potential for falsely elevated results   Calcium 03/05/2021 8 8  8 3 - 10 1 mg/dL Final    Corrected Calcium 03/05/2021 9 6  8 3 - 10 1 mg/dL Final    AST 03/05/2021 14  5 - 45 U/L Final    Specimen collection should occur prior to Sulfasalazine administration due to the potential for falsely depressed results   ALT 03/05/2021 34  12 - 78 U/L Final    Specimen collection should occur prior to Sulfasalazine administration due to the potential for falsely depressed results   Alkaline Phosphatase 03/05/2021 78  46 - 116 U/L Final    Total Protein 03/05/2021 7 7  6 4 - 8 2 g/dL Final    Albumin 03/05/2021 3 0* 3 5 - 5 0 g/dL Final    Total Bilirubin 03/05/2021 0 32  0 20 - 1 00 mg/dL Final    Use of this assay is not recommended for patients undergoing treatment with eltrombopag due to the potential for falsely elevated results      eGFR 03/05/2021 121  WY/QBI/8 19TU m Final    Cyclic Citrullinated Peptide Ab  03/06/2021 0 7  See comment Final    Albumin 03/06/2021 3 0* 3 5 - 5 0 g/dL Final    Calcium 03/06/2021 8 7  8 3 - 10 1 mg/dL Final    Corrected Calcium 03/06/2021 9 5  8 3 - 10 1 mg/dL Final    Phosphorus 03/06/2021 2 9  2 7 - 4 5 mg/dL Final    Glucose 03/06/2021 114  65 - 140 mg/dL Final    Specimen collection should occur prior to Sulfasalazine administration due to the potential for falsely depressed results  Specimen collection should occur prior to Sulfapyridine administration due to the potential for falsely elevated results  Specimen collection should occur prior to Sulfasalazine administration due to the potential for falsely depressed results  Specimen collection should occur prior to Sulfapyridine administration due to the potential for falsely elevated results   BUN 03/06/2021 12  5 - 25 mg/dL Final    Creatinine 03/06/2021 0 58* 0 60 - 1 30 mg/dL Final    Standardized to IDMS reference method    Sodium 03/06/2021 138  136 - 145 mmol/L Final    Potassium 03/06/2021 3 6  3 5 - 5 3 mmol/L Final    Chloride 03/06/2021 106  100 - 108 mmol/L Final    CO2 03/06/2021 23  21 - 32 mmol/L Final    ANION GAP 03/06/2021 9  4 - 13 mmol/L Final    eGFR 03/06/2021 127  ml/min/1 73sq m Final    T3, Total 03/06/2021 0 80  0 60 - 1 80 ng/mL Final    Free T4 03/06/2021 1 37  0 76 - 1 46 ng/dL Final    Specimen collection should occur prior to Sulfasalazine administration due to the potential for falsely elevated results     Hospital Outpatient Visit on 02/17/2021   Component Date Value Ref Range Status    EXT Preg Test, Ur 02/17/2021 Negative  Negative Final    Control 02/17/2021 Valid  Valid Final    Case Report 02/17/2021    Final                    Value:Surgical Pathology Report                         Case: [id number]                                   Authorizing Provider:  Dandre Arellano MD         Collected:           02/17/2021 0850              Ordering Location: [facility]        Received:            02/17/2021 [address] [place] [address] Endoscopy                                                          Pathologist:           Mireya Yuen DO                                                     Specimen:    Stomach, gastric bx-r/o h pylori                                                           Final Diagnosis 02/17/2021    Final                    Value: This result contains rich text formatting which cannot be displayed here   Note 02/17/2021    Final                    Value: This result contains rich text formatting which cannot be displayed here   Additional Information 02/17/2021    Final                    Value: This result contains rich text formatting which cannot be displayed here  Cheryl Forrester 02/17/2021    Final                    Value: This result contains rich text formatting which cannot be displayed here

## 2021-06-09 NOTE — PATIENT INSTRUCTIONS
Do labs as soon as possible  Take 2-3 gabapentin at bedtime  If gabapentin not helping, start Medrol dose pack  Schedule EMG    Return to clinic in 3 months    Connective Tissue Disorders   AMBULATORY CARE:   A connective tissue disorder  can affect any connective tissue in your body  Connective tissues support your organs, attach muscles to bones, and create scar tissue after an injury  Cartilage is an example of connective tissue  There are many types of connective tissue disorders, such as rheumatoid arthritis, lupus, and scleroderma  The most common affected areas are joints, muscles, and skin  Your organs, eyes, nervous system, and blood vessels can also be affected  Common signs and symptoms of a connective tissue disorder:  Signs and symptoms depend on the type of connective tissue disorder and if it is severe  Symptoms may be mild or severe, and may come and go:  · Fever or fatigue    · Skin rash or thickening, blisters, or sensitivity to sunlight    · Rash on your cheeks that goes across your nose    · Joint pain, swelling, or warmth    · Deformed joints, or limited range of motion    · Cold, numb, or swollen fingers    · Loss of appetite, or weight loss without trying    · Dry mouth or eyes, vision problems, or an eye infection such as conjunctivitis    · Hair loss    Call 911 for any of the following:   · You are sweating, and your lips are pale or blue  · You have trouble breathing, chest pain or pressure, or a fast heartbeat  · You are vomiting blood  · You have a high fever  Seek care immediately if:   · You lose feeling in your hands or feet  · You lose feeling on one side of your body  · You have sudden pain in your eyes and vision problems  Contact your healthcare provider if:   · You have trouble urinating, or you urinate less than usual     · You have trouble having a bowel movement, or you lose control of your bowel movements      · Your muscle or joint tightness worsens, or your fingers begin to curl  · Your symptoms get worse, even after treatment  · Your skin is itchy, swollen, or has a rash  · You have questions or concerns about your condition or care  Treatment  may include any of the following:  · Medicines  may be given to prevent your immune system from attacking healthy cells  You may also need medicines to stop the disease from getting worse  You may need to use topical creams or lotions to control a rash or other symptoms that affect your skin  · Prescription pain medicine  may be given  Ask your healthcare provider how to take this medicine safely  Some prescription pain medicines contain acetaminophen  Do not take other medicines that contain acetaminophen without talking to your healthcare provider  Too much acetaminophen may cause liver damage  Prescription pain medicine may cause constipation  Ask your healthcare provider how to prevent or treat constipation  · NSAIDs , such as ibuprofen, help decrease swelling, pain, and fever  This medicine is available with or without a doctor's order  NSAIDs can cause stomach bleeding or kidney problems in certain people  If you take blood thinner medicine, always ask your healthcare provider if NSAIDs are safe for you  Always read the medicine label and follow directions  · Acetaminophen  helps reduce pain and fever  Acetaminophen is available without a doctor's order  Ask how much to take and how often to take it  Follow directions  Acetaminophen can cause liver problems if not taken correctly  · Steroids  may be given to reduce swelling and pain  Manage your connective tissue disorder:   · Rest as needed  Talk to your healthcare provider if you are having trouble sleeping because of pain or other symptoms  Rest your joints if they are stiff or painful  Your healthcare provider may suggest support devices such as crutches or splints to help your joints rest      · Eat a variety of healthy foods  Healthy foods include fruits, vegetables, lean meats, fish, and low-fat dairy products  Work with your healthcare provider or dietitian to create healthy meal plans  · Go to physical or occupational therapy as directed  A physical therapist can help you create an exercise plan  Exercise may help increase your energy  Exercise can also help keep stiff joints flexible and increase range of motion  An occupational therapist can help you learn to do your daily activities when you have pain or swelling  · Talk to your healthcare provider about pregnancy  If you are a woman and want to get pregnant, talk to your healthcare provider  You or your baby might be at risk for complications  You may need to wait until your disease is controlled or your medications are finished before you get pregnant  You may also have trouble getting pregnant because of your disease  Your healthcare provider may be able to suggest ways to improve your ability to become pregnant  · Do not smoke  Nicotine and other chemicals in cigarettes and cigars can cause blood vessel and lung damage  Ask your healthcare provider for information if you currently smoke and need help to quit  E-cigarettes or smokeless tobacco still contain nicotine  Talk to your healthcare provider before you use these products  · Manage stress  Stress may slow healing and lead to illness  Learn ways to control stress, such as relaxation, deep breathing, or listening to music  Manage flares:  A flare means something triggered your symptoms  Stress, cold weather, and sunlight are examples of triggers  Your healthcare provider can help you create a management plan that includes what to do if you have a flare  Treat flares quickly to help prevent serious illness  · Apply ice or heat as directed  Ice helps reduce pain and swelling, and may help prevent tissue damage  Use an ice pack, or put crushed ice in a bag   Cover the bag with a towel and apply to the painful area for 15 to 20 minutes every hour, or as directed  Heat helps reduce pain and muscle spasms  Apply a warm compress to the area for 20 minutes every 2 hours, or as directed  · Elevate the area above the level of your heart  Elevation can help reduce swelling and pain, especially in your joints  Elevate the area as often as possible  · Keep your hands and feet warm  Certain connective tissue disorders can cause your hands and feet to become cold and painful  Over time, ulcers or gangrene (tissue death) may develop if frequent or severe attacks are not prevented  Dress warmly in cold weather, including gloves and thick socks  It may help to wiggle your fingers or toes to improve circulation  Follow up with your healthcare provider as directed: You may need ongoing tests or treatment  Write down your questions so you remember to ask them during your visits  © Copyright [address] Information is for End User's use only and may not be sold, redistributed or otherwise used for commercial purposes  All illustrations and images included in CareNotes® are the copyrighted property of [employer]  or [facility]   The above information is an  only  It is not intended as medical advice for individual conditions or treatments  Talk to your doctor, nurse or pharmacist before following any medical regimen to see if it is safe and effective for you

## 2021-06-11 ENCOUNTER — APPOINTMENT (OUTPATIENT)
Dept: LAB | Facility: HOSPITAL | Age: 26
End: 2021-06-11
Attending: INTERNAL MEDICINE
Payer: COMMERCIAL

## 2021-06-11 LAB
25(OH)D3 SERPL-MCNC: 7.9 NG/ML (ref 30–100)
ALBUMIN SERPL BCP-MCNC: 3.6 G/DL (ref 3.5–5)
ALP SERPL-CCNC: 89 U/L (ref 46–116)
ALT SERPL W P-5'-P-CCNC: 37 U/L (ref 12–78)
ANION GAP SERPL CALCULATED.3IONS-SCNC: 6 MMOL/L (ref 4–13)
AST SERPL W P-5'-P-CCNC: 16 U/L (ref 5–45)
BACTERIA UR QL AUTO: ABNORMAL /HPF
BASOPHILS # BLD AUTO: 0.06 THOUSANDS/ΜL (ref 0–0.1)
BASOPHILS NFR BLD AUTO: 1 % (ref 0–1)
BILIRUB SERPL-MCNC: 0.43 MG/DL (ref 0.2–1)
BILIRUB UR QL STRIP: ABNORMAL
BUN SERPL-MCNC: 11 MG/DL (ref 5–25)
CALCIUM SERPL-MCNC: 9.4 MG/DL (ref 8.3–10.1)
CHLORIDE SERPL-SCNC: 106 MMOL/L (ref 100–108)
CK SERPL-CCNC: 84 U/L (ref 26–192)
CLARITY UR: CLEAR
CO2 SERPL-SCNC: 25 MMOL/L (ref 21–32)
COLOR UR: ABNORMAL
CREAT SERPL-MCNC: 0.85 MG/DL (ref 0.6–1.3)
CREAT UR-MCNC: 327 MG/DL
CRP SERPL QL: 34.1 MG/L
EOSINOPHIL # BLD AUTO: 0.46 THOUSAND/ΜL (ref 0–0.61)
EOSINOPHIL NFR BLD AUTO: 5 % (ref 0–6)
ERYTHROCYTE [DISTWIDTH] IN BLOOD BY AUTOMATED COUNT: 13 % (ref 11.6–15.1)
ERYTHROCYTE [SEDIMENTATION RATE] IN BLOOD: 72 MM/HOUR (ref 0–19)
GFR SERPL CREATININE-BSD FRML MDRD: 94 ML/MIN/1.73SQ M
GLUCOSE SERPL-MCNC: 71 MG/DL (ref 65–140)
GLUCOSE UR STRIP-MCNC: NEGATIVE MG/DL
HBV CORE AB SER QL: NORMAL
HBV CORE IGM SER QL: NORMAL
HBV SURFACE AG SER QL: NORMAL
HCT VFR BLD AUTO: 42.4 % (ref 34.8–46.1)
HCV AB SER QL: NORMAL
HGB BLD-MCNC: 13.3 G/DL (ref 11.5–15.4)
HGB UR QL STRIP.AUTO: NEGATIVE
IMM GRANULOCYTES # BLD AUTO: 0.03 THOUSAND/UL (ref 0–0.2)
IMM GRANULOCYTES NFR BLD AUTO: 0 % (ref 0–2)
KETONES UR STRIP-MCNC: ABNORMAL MG/DL
LEUKOCYTE ESTERASE UR QL STRIP: NEGATIVE
LYMPHOCYTES # BLD AUTO: 2.32 THOUSANDS/ΜL (ref 0.6–4.47)
LYMPHOCYTES NFR BLD AUTO: 27 % (ref 14–44)
MCH RBC QN AUTO: 26.3 PG (ref 26.8–34.3)
MCHC RBC AUTO-ENTMCNC: 31.4 G/DL (ref 31.4–37.4)
MCV RBC AUTO: 84 FL (ref 82–98)
MONOCYTES # BLD AUTO: 0.52 THOUSAND/ΜL (ref 0.17–1.22)
MONOCYTES NFR BLD AUTO: 6 % (ref 4–12)
MUCOUS THREADS UR QL AUTO: ABNORMAL
NEUTROPHILS # BLD AUTO: 5.37 THOUSANDS/ΜL (ref 1.85–7.62)
NEUTS SEG NFR BLD AUTO: 61 % (ref 43–75)
NITRITE UR QL STRIP: NEGATIVE
NON-SQ EPI CELLS URNS QL MICRO: ABNORMAL /HPF
NRBC BLD AUTO-RTO: 0 /100 WBCS
PH UR STRIP.AUTO: 6 [PH]
PLATELET # BLD AUTO: 330 THOUSANDS/UL (ref 149–390)
PMV BLD AUTO: 10.8 FL (ref 8.9–12.7)
POTASSIUM SERPL-SCNC: 3.6 MMOL/L (ref 3.5–5.3)
PROT SERPL-MCNC: 8.3 G/DL (ref 6.4–8.2)
PROT UR STRIP-MCNC: NEGATIVE MG/DL
PROT UR-MCNC: 15 MG/DL
PROT/CREAT UR: 0.05 MG/G{CREAT} (ref 0–0.1)
RBC # BLD AUTO: 5.05 MILLION/UL (ref 3.81–5.12)
RBC #/AREA URNS AUTO: ABNORMAL /HPF
SODIUM SERPL-SCNC: 137 MMOL/L (ref 136–145)
SP GR UR STRIP.AUTO: 1.03 (ref 1–1.03)
TSH SERPL DL<=0.05 MIU/L-ACNC: 1.88 UIU/ML (ref 0.36–3.74)
UROBILINOGEN UR QL STRIP.AUTO: 1 E.U./DL
WBC # BLD AUTO: 8.76 THOUSAND/UL (ref 4.31–10.16)
WBC #/AREA URNS AUTO: ABNORMAL /HPF

## 2021-06-11 PROCEDURE — 84156 ASSAY OF PROTEIN URINE: CPT | Performed by: INTERNAL MEDICINE

## 2021-06-11 PROCEDURE — 85025 COMPLETE CBC W/AUTO DIFF WBC: CPT | Performed by: INTERNAL MEDICINE

## 2021-06-11 PROCEDURE — 80053 COMPREHEN METABOLIC PANEL: CPT | Performed by: INTERNAL MEDICINE

## 2021-06-11 PROCEDURE — 86803 HEPATITIS C AB TEST: CPT | Performed by: INTERNAL MEDICINE

## 2021-06-11 PROCEDURE — 87340 HEPATITIS B SURFACE AG IA: CPT | Performed by: INTERNAL MEDICINE

## 2021-06-11 PROCEDURE — 86705 HEP B CORE ANTIBODY IGM: CPT | Performed by: INTERNAL MEDICINE

## 2021-06-11 PROCEDURE — 82570 ASSAY OF URINE CREATININE: CPT | Performed by: INTERNAL MEDICINE

## 2021-06-11 PROCEDURE — 81001 URINALYSIS AUTO W/SCOPE: CPT | Performed by: INTERNAL MEDICINE

## 2021-06-11 PROCEDURE — 85652 RBC SED RATE AUTOMATED: CPT | Performed by: INTERNAL MEDICINE

## 2021-06-11 PROCEDURE — 82550 ASSAY OF CK (CPK): CPT | Performed by: INTERNAL MEDICINE

## 2021-06-11 PROCEDURE — 82306 VITAMIN D 25 HYDROXY: CPT | Performed by: INTERNAL MEDICINE

## 2021-06-11 PROCEDURE — 86140 C-REACTIVE PROTEIN: CPT | Performed by: INTERNAL MEDICINE

## 2021-06-11 PROCEDURE — 36415 COLL VENOUS BLD VENIPUNCTURE: CPT | Performed by: INTERNAL MEDICINE

## 2021-06-11 PROCEDURE — 86021 WBC ANTIBODY IDENTIFICATION: CPT | Performed by: INTERNAL MEDICINE

## 2021-06-11 PROCEDURE — 82085 ASSAY OF ALDOLASE: CPT | Performed by: INTERNAL MEDICINE

## 2021-06-11 PROCEDURE — 86704 HEP B CORE ANTIBODY TOTAL: CPT | Performed by: INTERNAL MEDICINE

## 2021-06-11 PROCEDURE — 84443 ASSAY THYROID STIM HORMONE: CPT | Performed by: INTERNAL MEDICINE

## 2021-06-14 LAB — ALDOLASE SERPL-CCNC: 7.6 U/L (ref 3.3–10.3)

## 2021-06-14 RX ORDER — ERGOCALCIFEROL 1.25 MG/1
50000 CAPSULE ORAL WEEKLY
Qty: 12 CAPSULE | Refills: 2 | Status: SHIPPED | OUTPATIENT
Start: 2021-06-14 | End: 2022-03-28 | Stop reason: ALTCHOICE

## 2021-06-24 ENCOUNTER — OFFICE VISIT (OUTPATIENT)
Dept: BARIATRICS | Facility: CLINIC | Age: 26
End: 2021-06-24
Payer: COMMERCIAL

## 2021-06-24 VITALS
HEIGHT: 65 IN | SYSTOLIC BLOOD PRESSURE: 140 MMHG | HEART RATE: 116 BPM | TEMPERATURE: 97.6 F | BODY MASS INDEX: 44.4 KG/M2 | DIASTOLIC BLOOD PRESSURE: 95 MMHG | WEIGHT: 266.5 LBS

## 2021-06-24 DIAGNOSIS — E04.1 THYROID NODULE: ICD-10-CM

## 2021-06-24 DIAGNOSIS — I10 ESSENTIAL HYPERTENSION: ICD-10-CM

## 2021-06-24 DIAGNOSIS — E28.2 PCOS (POLYCYSTIC OVARIAN SYNDROME): ICD-10-CM

## 2021-06-24 DIAGNOSIS — E66.01 OBESITY, CLASS III, BMI 40-49.9 (MORBID OBESITY) (HCC): Primary | ICD-10-CM

## 2021-06-24 PROCEDURE — 99214 OFFICE O/P EST MOD 30 MIN: CPT | Performed by: PHYSICIAN ASSISTANT

## 2021-06-24 RX ORDER — MONTELUKAST SODIUM 10 MG/1
TABLET ORAL
COMMUNITY
Start: 2021-05-25 | End: 2021-09-24

## 2021-06-24 RX ORDER — MELOXICAM 15 MG/1
15 TABLET ORAL DAILY PRN
COMMUNITY
Start: 2021-03-18 | End: 2021-09-24

## 2021-06-24 RX ORDER — NORETHINDRONE ACETATE AND ETHINYL ESTRADIOL AND FERROUS FUMARATE 1MG-20(21)
KIT ORAL
COMMUNITY
Start: 2021-06-23 | End: 2021-09-24

## 2021-06-24 RX ORDER — TRAMADOL HYDROCHLORIDE 50 MG/1
TABLET ORAL
COMMUNITY
Start: 2021-03-18 | End: 2021-09-24

## 2021-06-24 RX ORDER — SPIRONOLACTONE 25 MG/1
25 TABLET ORAL DAILY
COMMUNITY
End: 2021-09-24

## 2021-06-24 RX ORDER — LORATADINE 10 MG/1
10 TABLET ORAL DAILY
COMMUNITY
Start: 2021-05-24 | End: 2021-09-24

## 2021-06-24 NOTE — PROGRESS NOTES
Assessment/Plan:    Diagnoses and all orders for this visit:    Obesity, Class III, BMI 40-49 9 (morbid obesity) ([facility] [address] )    Interested in Vertical Sleeve Gastrectomy with Dr Sherren Kinds  10% Weight loss- not applicable   Screening labs-Completed  Support Group-Completed  Cardiac Risk Assessment-Completed  Upper Endoscopy-Completed; H  Pylori biopsy-Negative  Sleep Medicine Assessment-Not applicable  Alcohol and nicotine use is not recommended following bariatric surgery  NSAIDs should be avoided following bariatric surgery  Advised that pregnancy should be avoided following bariatric surgery for 12-18 months and should not solely rely on OCP and consider additional/alternative sources for contraception due to potential absorption issues-Completed    HTN  - on spironolactone     PCOS  - follows with GYN, restarting on on OCP     Since her last visit :  - was seen by rheumatology due to admission for leg pain back in March  Was told she is likely dealing wtih vasculitis and started on gabapentin   - has appt with endocrinology for thyroid mass    Follow up 1 month  I will reach out to clinical coordinator regarding the status of her appeal    Goals:  Food log (ie ) [http://www  [employer],sparkpeople  com,loseit com,calorieking  com,etc]www  myfitnesspal com,sparkpeople  com,loseit com,calorieking  com,etc  baritastic  No sugary beverages  At least 64oz of water daily  Increase physical activity by 10 minutes daily   Gradually increase physical activity to a goal of 5 days per week for 30 minutes of MODERATE intensity PLUS 2 days per week of FULL BODY resistance training  Follow lessons 1-6 in the manual  Follow the 30/60 minute rule  Goal protein intake of 60-80 grams per day  Alcohol and nicotine use is not recommended following bariatric surgery  NSAIDs (Motrin, Advil, Aleve, Naproxen, ibuprofen, etc) should be avoided following bariatric surgery)    Subjective:   Chief Complaint   Patient presents with    Weight Check      Weight Check     Patient ID: Lucinda Rodriguez is a 27 y o  female with excess weight/obesity here to pursue weight management  Past Medical History:   Diagnosis Date    Hypertension     Obesity     PCOS (polycystic ovarian syndrome)      HPI:  The patient has been:  Following the lessons in the manual- yes  Practicing the 30/60 minute rule- yes  Food logging- yes  Exercise- no but will start walking daily  Alcohol- no  Tobacco- no    The following portions of the patient's history were reviewed and updated as appropriate: allergies, current medications, past family history, past medical history, past social history, past surgical history and problem list   Review of Systems   Constitutional: Negative  Respiratory: Negative  Cardiovascular: Negative  Gastrointestinal: Negative  Musculoskeletal:        Leg pain    Neurological: Negative  Psychiatric/Behavioral: Negative  Objective:  /95   Pulse (!) 116   Temp 97 6 °F (36 4 °C)   Wt 121 kg (266 lb 8 oz)   BMI 44 35 kg/m²   Physical Exam  Vitals and nursing note reviewed  Constitutional:       Appearance: Normal appearance  She is obese  HENT:      Head: Normocephalic and atraumatic  Eyes:      Extraocular Movements: Extraocular movements intact  Pupils: Pupils are equal, round, and reactive to light  Cardiovascular:      Rate and Rhythm: Normal rate  Pulmonary:      Effort: Pulmonary effort is normal    Musculoskeletal:         General: Normal range of motion  Cervical back: Normal range of motion  Skin:     General: Skin is warm and dry  Neurological:      General: No focal deficit present  Mental Status: She is alert and oriented to person, place, and time     Psychiatric:         Mood and Affect: Mood normal

## 2021-06-29 LAB — MISCELLANEOUS LAB TEST RESULT: NORMAL

## 2021-07-15 ENCOUNTER — DOCUMENTATION (OUTPATIENT)
Dept: BARIATRICS | Facility: CLINIC | Age: 26
End: 2021-07-15

## 2021-07-15 ENCOUNTER — DOCUMENTATION (OUTPATIENT)
Dept: BARIATRICS | Facility: CLINIC | Age: 26
End: 2021-07-15
Payer: COMMERCIAL

## 2021-07-15 ENCOUNTER — OFFICE VISIT (OUTPATIENT)
Dept: BARIATRICS | Facility: CLINIC | Age: 26
End: 2021-07-15

## 2021-07-15 DIAGNOSIS — E66.01 OBESITY, CLASS III, BMI 40-49.9 (MORBID OBESITY) (HCC): Primary | ICD-10-CM

## 2021-07-15 DIAGNOSIS — E04.1 THYROID NODULE: ICD-10-CM

## 2021-07-15 DIAGNOSIS — E28.2 PCOS (POLYCYSTIC OVARIAN SYNDROME): ICD-10-CM

## 2021-07-15 DIAGNOSIS — M79.604 PAIN IN BOTH LOWER EXTREMITIES: ICD-10-CM

## 2021-07-15 DIAGNOSIS — G47.33 OSA (OBSTRUCTIVE SLEEP APNEA): ICD-10-CM

## 2021-07-15 DIAGNOSIS — M79.605 PAIN IN BOTH LOWER EXTREMITIES: ICD-10-CM

## 2021-07-15 DIAGNOSIS — I10 ESSENTIAL HYPERTENSION: ICD-10-CM

## 2021-07-15 PROCEDURE — 99212 OFFICE O/P EST SF 10 MIN: CPT | Performed by: SURGERY

## 2021-07-15 PROCEDURE — RECHECK: Performed by: SOCIAL WORKER

## 2021-07-15 NOTE — PROGRESS NOTES
To whom it may concern,     Fidelina Hampton is currently undergoing preoperative evaluation as she is a candidate for bariatric surgery  As part of her preoperative process thus far, she has shown compliance and is applying this new information to her daily regimen  She is food logging, tracking her daily calories and portions and working on increasing her physical activity daily  Since her initiation into our program, she has lost of 10 lbs independently  We feel that she will benefit from bariatric surgery for several reasons  Of note, patient is currently on Spirinolactone for treatment of her hypertension  She has been on this medication for many years in the hopes of eventually coming off this medication  Furthermore, patient suffers from PCOS and due to symptoms had to recently restart on her oral contraceptive    patient has hopes of becoming pregnant and as evidenced through studies, weight loss can directly and positively affect fertility  Patient recently developed issues with leg pain and vasculitis as well, and weight loss will certainly help relieve a lot of the pain that is associated with this condition  Her weight loss would significantly affect her overall health in a positive way  As discussed above, Janny Holder has shown compliance and eagerness to succeed and would benefit from bariatric surgery for medical reasons as aforementioned       Eloisa Marroquin Pa-C

## 2021-07-15 NOTE — PROGRESS NOTES
BARIATRIC HISTORY AND PHYSICAL - BARIATRIC SURGERY  Denson Marina Posada 27 y o  female MRN: [id number]  Unit/Bed#:  Encounter: [id number]      HPI:  Rita Quiroga is a 27 y o  female who presents  Morbid obesity  With a BMI of 44 35, polycystic ovarian syndrome on birth control medications, hypertension, sleep apnea on CPAP machine, thyroid nodules, and bilateral lower extremity pain   Patient has a long history of morbid obesity and was unable to lose any meaningful or sustainable weight using nonsurgical means  Bariatric surgery has shown to improve if not resolve some of her comorbidities  She is a good candidate for bariatric surgery for the medical necessity  Review of Systems   Constitutional: Negative for chills and fatigue  HENT: Negative for ear pain  Eyes: Negative for pain  Respiratory: Positive for apnea  Negative for cough, shortness of breath and wheezing  Cardiovascular: Negative for chest pain  Hypertension   Gastrointestinal: Negative for abdominal distention and abdominal pain  Endocrine: Negative for polydipsia  Genitourinary: Negative for flank pain  Musculoskeletal: Positive for arthralgias and back pain  Skin: Negative for pallor  Allergic/Immunologic: Negative for food allergies  Neurological: Negative for weakness and headaches  Lower extremity pain   Hematological: Does not bruise/bleed easily  Psychiatric/Behavioral: Negative for confusion         Historical Information   Past Medical History:   Diagnosis Date    Hypertension     Obesity     PCOS (polycystic ovarian syndrome)      Past Surgical History:   Procedure Laterality Date    NO PAST SURGERIES       Social History   Social History     Substance and Sexual Activity   Alcohol Use No     Social History     Substance and Sexual Activity   Drug Use No     Social History     Tobacco Use   Smoking Status Never Smoker   Smokeless Tobacco Former User   Tobacco Comment    patient admits to hx of 1 or 2 cigarettes a year  Used hookah     Family History: non-contributory    Meds/Allergies   all medications and allergies reviewed  No Known Allergies    Objective     Current Vitals:      bowel movement  [unfilled]    Invasive Devices     None                 Physical Exam  Constitutional:       Appearance: Normal appearance  HENT:      Head: Normocephalic  Mouth/Throat:      Mouth: Mucous membranes are moist    Eyes:      Conjunctiva/sclera: Conjunctivae normal       Pupils: Pupils are equal, round, and reactive to light  Cardiovascular:      Rate and Rhythm: Normal rate and regular rhythm  Pulmonary:      Effort: Pulmonary effort is normal    Abdominal:      General: There is no distension  Tenderness: There is no abdominal tenderness  Comments: obese   Musculoskeletal:         General: Normal range of motion  Cervical back: Normal range of motion  Skin:     General: Skin is warm  Capillary Refill: Capillary refill takes less than 2 seconds  Neurological:      General: No focal deficit present  Mental Status: She is alert  Psychiatric:         Mood and Affect: Mood normal          Lab Results: I have personally reviewed pertinent lab results  Imaging: I have personally reviewed pertinent reports  EKG, Pathology, and Other Studies: I have personally reviewed pertinent reports  [facility]  Advance Directive and Living Will:      Power of :    POLST:      Assessment/Plan:   Patient has a long history of morbid obesity and is presenting to discuss the surgical weight loss options  Despite the patient best efforts patient was unable to lose any meaningful or sustainable weight using nonsurgical means  We had a long discussion regarding all the surgical weight-loss options at our disposal at this point and reviewed the risks and benefits of each procedure in details as it relates to her age, BMI and medical conditions     patient is a good candidate to undergo bariatric surgery for medical necessity  Patient elected to undergo sleeve     Risks and benefits were explained to the patient  We also discussed the importance and need of a preoperative workup to make sure that the patient can undergo the procedure safely  Preoperative workup includes sleep apnea screening, cardiac evaluation, nutrition/psych and preoperative EGD  Risks and benefits of all the preoperative diagnostic tests were discussed with the patient including but not limited to the upper endoscopy  Alternatives to surgery and alternative forms of surgery were also explained  Postsurgical commitment and aftercare programs were discussed and explained to the patient in details       In terms of comorbidities patient suffers mostly of   Medical History        Past Medical History:   Diagnosis Date    Hypertension      Obesity, BMI 44 35      PCOS (polycystic ovarian syndrome)          sleep apnea on CPAP   thyroid nodules   bilateral lower extremity pain     I informed the patient that the rate of resolution of comorbid conditions following weight loss surgery is between 60 and 90% depending on the severity of the specific medical condition  I discussed and educated the patient regarding the different components of our multidisciplinary program and the importance of compliance and follow-up in the postoperative period  All questions answered  Patient understands risks and benefits  A videotape of the procedure was also shown to the patient  After showing the video we discussed all the technical aspects of the procedure and also the potential complications including but not limited to gastrointestinal perforation, leak, obstruction, stricture and hemorrhage  I spent 45 min with the patient more than 50% of the time was spent educating the patient and coordinating care          Luis Singh MD  Bariatric Surgery Fellow  7/15/2021  8:49 AM

## 2021-07-15 NOTE — PROGRESS NOTES
Montrell Garvin  is a 27 y o    female    :  1995    [address] [place] [address]   Target weight: 287     Current patient status: successfully losing today at 264  What are you doing to prepare for surgery? She is addressing current appeals to the denial of insurance benefit  We reviewed the impact of increase activity on today's continues weight loss  Goal for next visit:  Continues weight loss and support for positive outcome  Next appointment is scheduled for  with [facility]      Kaci Haynes, STELLA, KIERRAW  _____________________________

## 2021-08-19 ENCOUNTER — OFFICE VISIT (OUTPATIENT)
Dept: BARIATRICS | Facility: CLINIC | Age: 26
End: 2021-08-19

## 2021-08-19 VITALS — WEIGHT: 263.1 LBS | BODY MASS INDEX: 43.83 KG/M2 | HEIGHT: 65 IN

## 2021-08-19 DIAGNOSIS — E66.01 OBESITY, CLASS III, BMI 40-49.9 (MORBID OBESITY) (HCC): Primary | ICD-10-CM

## 2021-08-19 PROCEDURE — RECHECK

## 2021-08-19 NOTE — PROGRESS NOTES
Bariatric Follow Up Nutrition Note    Preop 8/4 weight checks  Preop Program    Type of surgery  Preop  Surgery Date: TBD    Surgeon: Dr Rosanne Felty  27 y o   female  Height 5' 5" (1 651 m), weight 119 kg (263 lb 1 6 oz), not currently breastfeeding  Body mass index is 43 78 kg/m²  Review of History and Medications   Past Medical History:   Diagnosis Date    Hypertension     Obesity     PCOS (polycystic ovarian syndrome)      Past Surgical History:   Procedure Laterality Date    NO PAST SURGERIES       Social History     Socioeconomic History    Marital status: /Civil Union     Spouse name: Not on file    Number of children: Not on file    Years of education: Not on file    Highest education level: Not on file   Occupational History    Not on file   Tobacco Use    Smoking status: Never Smoker    Smokeless tobacco: Former User    Tobacco comment: patient admits to hx of 1 or 2 cigarettes a year  Used hookah   Vaping Use    Vaping Use: Never used   Substance and Sexual Activity    Alcohol use: No    Drug use: No    Sexual activity: Yes     Partners: Male     Birth control/protection: OCP   Other Topics Concern    Not on file   Social History Narrative    Not on file     Social Determinants of Health     Financial Resource Strain:     Difficulty of Paying Living Expenses:    Food Insecurity:     Worried About Running Out of Food in the Last Year:     [address] in the Last Year:    Transportation Needs:     Lack of Transportation (Medical):      Lack of Transportation (Non-Medical):    Physical Activity:     Days of Exercise per Week:     Minutes of Exercise per Session:    Stress:     Feeling of Stress :    Social Connections:     Frequency of Communication with Friends and Family:     Frequency of Social Gatherings with Friends and Family:     Attends [employer] Services:     Active Member of Clubs or Organizations:     Attends Club or Organization Meetings:     Marital Status:    Intimate Partner Violence:     Fear of Current or Ex-Partner:     Emotionally Abused:     Physically Abused:     Sexually Abused:        Current Outpatient Medications:     ergocalciferol (VITAMIN D2) 50,000 units, Take 1 capsule (50,000 Units total) by mouth once a week, Disp: 12 capsule, Rfl: 2    gabapentin (NEURONTIN) 300 mg capsule, Take 3 capsules (900 mg total) by mouth daily at bedtime, Disp: 90 capsule, Rfl: 3    Junel FE 1/20 1-20 MG-MCG per tablet, , Disp: , Rfl:     loratadine (CLARITIN) 10 mg tablet, Take 10 mg by mouth daily, Disp: , Rfl:     meloxicam (MOBIC) 15 mg tablet, Take 15 mg by mouth daily as needed, Disp: , Rfl:     methylPREDNISolone 4 MG tablet therapy pack, Use as directed on package, Disp: 21 tablet, Rfl: 0    montelukast (SINGULAIR) 10 mg tablet, , Disp: , Rfl:     spironolactone (ALDACTONE) 25 mg tablet, Take 25 mg by mouth daily, Disp: , Rfl:     traMADol (ULTRAM) 50 mg tablet, TAKE 1 TABLET BY MOUTH FOUR TIMES A DAY AS NEEDED FOR MODERATE TO SEVERE PAIN, Disp: , Rfl:     Food Intake and Lifestyle Assessment   Food Intake Assessment completed via usual diet recall  Breakfast: Premier Shake or grapes, cheese and cracker or almonds  Snack: 0   Lunch: grapes with cheese or yogurt parfait  Snack: 0  Dinner: grilled chicken, if sandwich only 1 piece of bread or another protein shake  Snack: 0  Beverage intake: water, and coffee/tea  Diet texture/stage: regular  Protein supplement: 0  Estimated protein intake per day: 60 gm  Estimated fluid intake per day: 64 oz water and 8 oz coffee or none  Meals eaten away from home: almost daily  Typical meal pattern: 1 meals per day and 0 snacks per day  Eating Behaviors: increasing protein, water, decreased caffeine    Food allergies or intolerances: none  Cultural or Mormon considerations: [place]    Physical Assessment  Nutrition Related Findings  Not making changes yet    Physical Activity  Types of exercise: None-trying to move more-going up steps more, walking around the block  Current physical limitations: none    Psychosocial Assessment   Support systems: spouse  Socioeconomic factors: works 3 jobs-real estate, [employer]  Full time in [facility] [employer] [facility], [address] [place] [address] as needed    Nutrition Diagnosis  Diagnosis: Overweight / Obesity (NC-3 3)  Related to: Physical inactivity and Excessive energy intake  As Evidenced by: BMI >25     Interventions and Teaching   Patient educated on post-op nutrition guidelines  Patient educated and handouts provided    Surgical changes to stomach / GI  Capacity of post-surgery stomach  Diet progression  Adequate hydration  Sugar and fat restriction to decrease "dumping syndrome"  Fat restriction to decrease steatorrhea  Expected weight loss  Weight loss plateaus/ possibility of weight regain  Exercise  Suggestions for pre-op diet  Nutrition considerations after surgery  Protein supplements  Meal planning and preparation  Appropriate carbohydrate, protein, and fat intake, and food/fluid choices to maximize safe weight loss, nutrient intake, and tolerance   Dietary and lifestyle changes  Possible problems with poor eating habits  Intuitive eating  Techniques for self monitoring and keeping daily food journal  Potential for food intolerance after surgery, and ways to deal with them including: lactose intolerance, nausea, reflux, vomiting, diarrhea, food intolerance, appetite changes, gas  Vitamin / Mineral supplementation of Multivitamin with minerals and Vitamin D    Education provided to: patient    Barriers to learning: No barriers identified    Readiness to change: preparation    Comprehension: verbalizes understanding     Expected Compliance: good    Goals  Eliminate sugar sweetened beverages, Food journal, Exercise 30 minutes 5 times per week, Complete lession plans 1-6 and Eat 3 meals per day     Time Spent:   30 Minutes negative...

## 2021-08-28 ENCOUNTER — HOSPITAL ENCOUNTER (EMERGENCY)
Facility: HOSPITAL | Age: 26
Discharge: HOME | End: 2021-08-28
Attending: EMERGENCY MEDICINE | Admitting: EMERGENCY MEDICINE
Payer: COMMERCIAL

## 2021-08-28 ENCOUNTER — APPOINTMENT (EMERGENCY)
Dept: RADIOLOGY | Facility: HOSPITAL | Age: 26
End: 2021-08-28
Payer: COMMERCIAL

## 2021-08-28 VITALS
OXYGEN SATURATION: 100 % | DIASTOLIC BLOOD PRESSURE: 81 MMHG | RESPIRATION RATE: 22 BRPM | SYSTOLIC BLOOD PRESSURE: 138 MMHG | HEART RATE: 97 BPM | TEMPERATURE: 97 F

## 2021-08-28 DIAGNOSIS — K63.89 EPIPLOIC APPENDAGITIS: Primary | ICD-10-CM

## 2021-08-28 DIAGNOSIS — R11.0 NAUSEA: ICD-10-CM

## 2021-08-28 LAB
ALBUMIN SERPL BCP-MCNC: 3.5 G/DL (ref 3.5–5)
ALP SERPL-CCNC: 99 U/L (ref 46–116)
ALT SERPL W P-5'-P-CCNC: 35 U/L (ref 12–78)
ANION GAP SERPL CALCULATED.3IONS-SCNC: 3 MMOL/L (ref 4–13)
AST SERPL W P-5'-P-CCNC: 17 U/L (ref 5–45)
BASOPHILS # BLD AUTO: 0.04 THOUSANDS/ΜL (ref 0–0.1)
BASOPHILS NFR BLD AUTO: 0 % (ref 0–1)
BILIRUB SERPL-MCNC: 0.37 MG/DL (ref 0.2–1)
BILIRUB UR QL STRIP: NEGATIVE
BUN SERPL-MCNC: 11 MG/DL (ref 5–25)
CALCIUM SERPL-MCNC: 9.2 MG/DL (ref 8.3–10.1)
CHLORIDE SERPL-SCNC: 106 MMOL/L (ref 100–108)
CLARITY UR: CLEAR
CO2 SERPL-SCNC: 25 MMOL/L (ref 21–32)
COLOR UR: NORMAL
CREAT SERPL-MCNC: 0.88 MG/DL (ref 0.6–1.3)
EOSINOPHIL # BLD AUTO: 0.34 THOUSAND/ΜL (ref 0–0.61)
EOSINOPHIL NFR BLD AUTO: 3 % (ref 0–6)
ERYTHROCYTE [DISTWIDTH] IN BLOOD BY AUTOMATED COUNT: 13.6 % (ref 11.6–15.1)
EXT PREG TEST URINE: NORMAL
EXT. CONTROL ED NAV: NORMAL
GFR SERPL CREATININE-BSD FRML MDRD: 90 ML/MIN/1.73SQ M
GLUCOSE SERPL-MCNC: 67 MG/DL (ref 65–140)
GLUCOSE UR STRIP-MCNC: NEGATIVE MG/DL
HCT VFR BLD AUTO: 44 % (ref 34.8–46.1)
HGB BLD-MCNC: 13.9 G/DL (ref 11.5–15.4)
HGB UR QL STRIP.AUTO: NEGATIVE
IMM GRANULOCYTES # BLD AUTO: 0.03 THOUSAND/UL (ref 0–0.2)
IMM GRANULOCYTES NFR BLD AUTO: 0 % (ref 0–2)
KETONES UR STRIP-MCNC: NEGATIVE MG/DL
LEUKOCYTE ESTERASE UR QL STRIP: NEGATIVE
LYMPHOCYTES # BLD AUTO: 3.76 THOUSANDS/ΜL (ref 0.6–4.47)
LYMPHOCYTES NFR BLD AUTO: 33 % (ref 14–44)
MCH RBC QN AUTO: 25.6 PG (ref 26.8–34.3)
MCHC RBC AUTO-ENTMCNC: 31.6 G/DL (ref 31.4–37.4)
MCV RBC AUTO: 81 FL (ref 82–98)
MONOCYTES # BLD AUTO: 0.8 THOUSAND/ΜL (ref 0.17–1.22)
MONOCYTES NFR BLD AUTO: 7 % (ref 4–12)
NEUTROPHILS # BLD AUTO: 6.47 THOUSANDS/ΜL (ref 1.85–7.62)
NEUTS SEG NFR BLD AUTO: 57 % (ref 43–75)
NITRITE UR QL STRIP: NEGATIVE
NRBC BLD AUTO-RTO: 0 /100 WBCS
PH UR STRIP.AUTO: 6 [PH] (ref 4.5–8)
PLATELET # BLD AUTO: 359 THOUSANDS/UL (ref 149–390)
PMV BLD AUTO: 10.4 FL (ref 8.9–12.7)
POTASSIUM SERPL-SCNC: 3.3 MMOL/L (ref 3.5–5.3)
PROT SERPL-MCNC: 8.7 G/DL (ref 6.4–8.2)
PROT UR STRIP-MCNC: NEGATIVE MG/DL
RBC # BLD AUTO: 5.43 MILLION/UL (ref 3.81–5.12)
SODIUM SERPL-SCNC: 134 MMOL/L (ref 136–145)
SP GR UR STRIP.AUTO: >=1.03 (ref 1–1.03)
UROBILINOGEN UR QL STRIP.AUTO: 0.2 E.U./DL
WBC # BLD AUTO: 11.44 THOUSAND/UL (ref 4.31–10.16)

## 2021-08-28 PROCEDURE — 96361 HYDRATE IV INFUSION ADD-ON: CPT

## 2021-08-28 PROCEDURE — 81003 URINALYSIS AUTO W/O SCOPE: CPT

## 2021-08-28 PROCEDURE — 99284 EMERGENCY DEPT VISIT MOD MDM: CPT

## 2021-08-28 PROCEDURE — 99284 EMERGENCY DEPT VISIT MOD MDM: CPT | Performed by: EMERGENCY MEDICINE

## 2021-08-28 PROCEDURE — 74177 CT ABD & PELVIS W/CONTRAST: CPT

## 2021-08-28 PROCEDURE — 96374 THER/PROPH/DIAG INJ IV PUSH: CPT

## 2021-08-28 PROCEDURE — 85025 COMPLETE CBC W/AUTO DIFF WBC: CPT

## 2021-08-28 PROCEDURE — 81025 URINE PREGNANCY TEST: CPT

## 2021-08-28 PROCEDURE — G1004 CDSM NDSC: HCPCS

## 2021-08-28 PROCEDURE — 36415 COLL VENOUS BLD VENIPUNCTURE: CPT

## 2021-08-28 PROCEDURE — 80053 COMPREHEN METABOLIC PANEL: CPT

## 2021-08-28 RX ORDER — ONDANSETRON 4 MG/1
4 TABLET, ORALLY DISINTEGRATING ORAL EVERY 6 HOURS PRN
Qty: 14 TABLET | Refills: 0 | Status: SHIPPED | OUTPATIENT
Start: 2021-08-28 | End: 2021-11-23 | Stop reason: HOSPADM

## 2021-08-28 RX ORDER — ONDANSETRON 4 MG/1
4 TABLET, ORALLY DISINTEGRATING ORAL EVERY 6 HOURS PRN
Qty: 14 TABLET | Refills: 0 | Status: SHIPPED | OUTPATIENT
Start: 2021-08-28 | End: 2021-08-28 | Stop reason: CLARIF

## 2021-08-28 RX ORDER — POTASSIUM CHLORIDE 20 MEQ/1
20 TABLET, EXTENDED RELEASE ORAL ONCE
Status: COMPLETED | OUTPATIENT
Start: 2021-08-28 | End: 2021-08-28

## 2021-08-28 RX ORDER — KETOROLAC TROMETHAMINE 30 MG/ML
15 INJECTION, SOLUTION INTRAMUSCULAR; INTRAVENOUS ONCE
Status: COMPLETED | OUTPATIENT
Start: 2021-08-28 | End: 2021-08-28

## 2021-08-28 RX ADMIN — IOHEXOL 100 ML: 350 INJECTION, SOLUTION INTRAVENOUS at 21:59

## 2021-08-28 RX ADMIN — POTASSIUM CHLORIDE 20 MEQ: 1500 TABLET, EXTENDED RELEASE ORAL at 22:10

## 2021-08-28 RX ADMIN — SODIUM CHLORIDE 1000 ML: 0.9 INJECTION, SOLUTION INTRAVENOUS at 21:14

## 2021-08-28 RX ADMIN — KETOROLAC TROMETHAMINE 15 MG: 30 INJECTION, SOLUTION INTRAMUSCULAR; INTRAVENOUS at 22:59

## 2021-08-29 NOTE — ED ATTENDING ATTESTATION
8/28/2021  IApril MD, saw and evaluated the patient  I have discussed the patient with the resident/non-physician practitioner and agree with the resident's/non-physician practitioner's findings, Plan of Care, and MDM as documented in the resident's/non-physician practitioner's note, except where noted  All available labs and Radiology studies were reviewed  I was present for key portions of any procedure(s) performed by the resident/non-physician practitioner and I was immediately available to provide assistance  At this point I agree with the current assessment done in the Emergency Department  I have conducted an independent evaluation of this patient a history and physical is as follows:    ED Course     Pain in presents for evaluation due to 4 days of left lower quadrant abdominal pain  Patient states the pain is constant and is worse with movement  Additionally she reports having increased urinary frequency  Patient has a history of PCOS additionally, patient reports some lower extremity edema  No additional complaints  A/P:  Abdominal pain, edema  Will check labs, urine, and bowel CT abdomen pelvis to evaluate for kidney stone, ovarian pathology, or diverticulitis      Critical Care Time  Procedures

## 2021-08-29 NOTE — ED PROVIDER NOTES
History  Chief Complaint   Patient presents with    Abdominal Pain     pt stated LLQ abd pain x4days, pain has worsened, causing discomfort while walking, laughing, or any activity, pt also c/o LE edema B/L     Patient is a 40-year-old female with a past medical history of hypertension and PCOS, currently presenting with left-sided flank and left lower quadrant pain  She states that this pain started approximately 4 days ago, unprovoked  She describes as a sharp pain that is also dull at times  She notes that movement and laughter seem to make pain worse  She has not done anything for her pain  She states that it is constant  It does not radiate into her groin  She has also noticed some bilateral leg swelling and is unsure if this is related  She states that she is having urinary frequency, however is not having any dysuria or hematuria, and has had urinary tract infections in the past and this does not feel the same  She is also denying vaginal bleeding, or vaginal pain, and states that she has had irregular periods and took a pregnancy test approximately 2 days ago which was negative  She is also denying nausea, vomiting, diarrhea, constipation, or changes in bowel movements  She has no difficulty breathing or chest pain  She has no other acute complaints at this time  Prior to Admission Medications   Prescriptions Last Dose Informant Patient Reported? Taking?    Junel FE 1/20 1-20 MG-MCG per tablet   Yes No   ergocalciferol (VITAMIN D2) 50,000 units   No No   Sig: Take 1 capsule (50,000 Units total) by mouth once a week   gabapentin (NEURONTIN) 300 mg capsule   No No   Sig: Take 3 capsules (900 mg total) by mouth daily at bedtime   loratadine (CLARITIN) 10 mg tablet   Yes No   Sig: Take 10 mg by mouth daily   meloxicam (MOBIC) 15 mg tablet   Yes No   Sig: Take 15 mg by mouth daily as needed   methylPREDNISolone 4 MG tablet therapy pack   No No   Sig: Use as directed on package   montelukast (SINGULAIR) 10 mg tablet   Yes No   spironolactone (ALDACTONE) 25 mg tablet   Yes No   Sig: Take 25 mg by mouth daily   traMADol (ULTRAM) 50 mg tablet   Yes No   Sig: TAKE 1 TABLET BY MOUTH FOUR TIMES A DAY AS NEEDED FOR MODERATE TO SEVERE PAIN      Facility-Administered Medications: None       Past Medical History:   Diagnosis Date    Hypertension     Obesity     PCOS (polycystic ovarian syndrome)        Past Surgical History:   Procedure Laterality Date    NO PAST SURGERIES         Family History   Problem Relation Age of Onset    Thyroid disease Mother     Sarcoidosis Mother     Hypertension Father     Diabetes Father     Hyperlipidemia Father     Cancer Neg Hx     Stroke Neg Hx      I have reviewed and agree with the history as documented  E-Cigarette/Vaping    E-Cigarette Use Never User      E-Cigarette/Vaping Substances    Nicotine No     THC No     CBD No     Flavoring No     Other No     Unknown No      Social History     Tobacco Use    Smoking status: Never Smoker    Smokeless tobacco: Former User    Tobacco comment: patient admits to hx of 1 or 2 cigarettes a year  Used hookah   Vaping Use    Vaping Use: Never used   Substance Use Topics    Alcohol use: No    Drug use: No        Review of Systems   Constitutional: Negative for chills and fever  HENT: Negative for ear pain, rhinorrhea and sore throat  Eyes: Negative for pain  Respiratory: Negative for shortness of breath  Cardiovascular: Negative for chest pain  Gastrointestinal: Positive for abdominal pain  Negative for constipation, diarrhea, nausea and vomiting  Genitourinary: Positive for flank pain and frequency  Negative for dysuria, hematuria, pelvic pain, vaginal bleeding and vaginal pain  Musculoskeletal: Negative for back pain and myalgias  Skin: Negative for rash  Neurological: Negative for dizziness, syncope, light-headedness and headaches  Psychiatric/Behavioral: Negative for agitation     All other systems reviewed and are negative  Physical Exam  ED Triage Vitals   Temperature Pulse Respirations Blood Pressure SpO2   08/28/21 2006 08/28/21 2004 08/28/21 2004 08/28/21 2004 08/28/21 2004   (!) 97 °F (36 1 °C) 102 18 153/86 100 %      Temp Source Heart Rate Source Patient Position - Orthostatic VS BP Location FiO2 (%)   08/28/21 2006 08/28/21 2004 08/28/21 2004 08/28/21 2004 --   Tympanic Monitor Lying Right arm       Pain Score       08/28/21 2004       8             Orthostatic Vital Signs  Vitals:    08/28/21 2004 08/28/21 2213   BP: 153/86 138/81   Pulse: 102 97   Patient Position - Orthostatic VS: Lying Sitting       Physical Exam  Vitals and nursing note reviewed  Constitutional:       General: She is not in acute distress  Appearance: Normal appearance  She is normal weight  HENT:      Head: Normocephalic and atraumatic  Right Ear: External ear normal       Left Ear: External ear normal       Nose: Nose normal    Eyes:      General: No scleral icterus  Right eye: No discharge  Left eye: No discharge  Extraocular Movements: Extraocular movements intact  Conjunctiva/sclera: Conjunctivae normal    Cardiovascular:      Rate and Rhythm: Normal rate and regular rhythm  Pulses: Normal pulses  Heart sounds: Normal heart sounds  No murmur heard  No friction rub  No gallop  Pulmonary:      Effort: Pulmonary effort is normal  No respiratory distress  Breath sounds: Normal breath sounds  Abdominal:      General: Abdomen is flat  Bowel sounds are normal  There is no distension  Palpations: Abdomen is soft  There is no mass  Tenderness: There is abdominal tenderness in the left lower quadrant  There is left CVA tenderness  There is no right CVA tenderness or guarding  Negative signs include Bourne's sign and McBurney's sign  Musculoskeletal:         General: Normal range of motion  Cervical back: Normal range of motion        Right lower le+ Edema present  Left lower le+ Edema present  Skin:     General: Skin is warm and dry  Neurological:      General: No focal deficit present  Mental Status: She is alert     Psychiatric:         Mood and Affect: Mood normal          ED Medications  Medications   sodium chloride 0 9 % bolus 1,000 mL (0 mL Intravenous Stopped 21)   potassium chloride (K-DUR,KLOR-CON) CR tablet 20 mEq (20 mEq Oral Given 21)   iohexol (OMNIPAQUE) 350 MG/ML injection (MULTI-DOSE) 100 mL (100 mL Intravenous Given 21)   ketorolac (TORADOL) injection 15 mg (15 mg Intravenous Given 21)       Diagnostic Studies  Results Reviewed     Procedure Component Value Units Date/Time    POCT pregnancy, urine [[id number]]  (Normal) Resulted: 21    Lab Status: Final result Updated: 21     EXT PREG TEST UR (Ref: Negative) neg     Control valid    Comprehensive metabolic panel [[id number]]  (Abnormal) Collected: 21    Lab Status: Final result Specimen: Blood from Arm, Right Updated: 21     Sodium 134 mmol/L      Potassium 3 3 mmol/L      Chloride 106 mmol/L      CO2 25 mmol/L      ANION GAP 3 mmol/L      BUN 11 mg/dL      Creatinine 0 88 mg/dL      Glucose 67 mg/dL      Calcium 9 2 mg/dL      AST 17 U/L      ALT 35 U/L      Alkaline Phosphatase 99 U/L      Total Protein 8 7 g/dL      Albumin 3 5 g/dL      Total Bilirubin 0 37 mg/dL      eGFR 90 ml/min/1 73sq m     Narrative:      [place] guidelines for Chronic Kidney Disease (CKD):     Stage 1 with normal or high GFR (GFR > 90 mL/min/1 73 square meters)    Stage 2 Mild CKD (GFR = 60-89 mL/min/1 73 square meters)    Stage 3A Moderate CKD (GFR = 45-59 mL/min/1 73 square meters)    Stage 3B Moderate CKD (GFR = 30-44 mL/min/1 73 square meters)    Stage 4 Severe CKD (GFR = 15-29 mL/min/1 73 square meters)    Stage 5 End Stage CKD (GFR <15 mL/min/1 73 square meters)  Note: GFR calculation is accurate only with a steady state creatinine    CBC and differential [[id number]]  (Abnormal) Collected: 08/28/21 2112    Lab Status: Final result Specimen: Blood from Arm, Right Updated: 08/28/21 2131     WBC 11 44 Thousand/uL      RBC 5 43 Million/uL      Hemoglobin 13 9 g/dL      Hematocrit 44 0 %      MCV 81 fL      MCH 25 6 pg      MCHC 31 6 g/dL      RDW 13 6 %      MPV 10 4 fL      Platelets 581 Thousands/uL      nRBC 0 /100 WBCs      Neutrophils Relative 57 %      Immat GRANS % 0 %      Lymphocytes Relative 33 %      Monocytes Relative 7 %      Eosinophils Relative 3 %      Basophils Relative 0 %      Neutrophils Absolute 6 47 Thousands/µL      Immature Grans Absolute 0 03 Thousand/uL      Lymphocytes Absolute 3 76 Thousands/µL      Monocytes Absolute 0 80 Thousand/µL      Eosinophils Absolute 0 34 Thousand/µL      Basophils Absolute 0 04 Thousands/µL     Urine Macroscopic, POC [[id number]] Collected: 08/28/21 2118    Lab Status: Final result Specimen: Urine Updated: 08/28/21 2120     Color, UA Berkley     Clarity, UA Clear     pH, UA 6 0     Leukocytes, UA Negative     Nitrite, UA Negative     Protein, UA Negative mg/dl      Glucose, UA Negative mg/dl      Ketones, UA Negative mg/dl      Urobilinogen, UA 0 2 E U /dl      Bilirubin, UA Negative     Blood, UA Negative     Specific Gravity, UA >=1 030    Narrative:      CLINITEK RESULT                 CT abdomen pelvis with contrast   Final Result by Elaina Arguello DO (08/28 2241)      Prominence of an epiploic appendage with associated fat stranding in the region of the mid descending colon (coronal image 62, series 601, axial image 52, series 2), as described; suspicious for epiploic appendagitis  No discrete pericolonic abscess is seen  Small hiatal hernia, and other findings as above              Workstation performed: [id number]               Procedures  Procedures      ED Course  ED Course as of Aug 29 0952   Sat Aug 28, 2021   2030 Patient examined by me  She is in stable condition  Vital signs reviewed  Labs and Imaging ordered  2148 Will replete   Potassium(!): 3 3   2222 Anion Gap(!): 3   2225 Patient reexamined  She remained stable  Exam unchanged  She states that she is still having some abdominal pain, however she is refusing pain medications  MDM  Number of Diagnoses or Management Options  Epiploic appendagitis  Nausea  Diagnosis management comments: Patient is 27 female a history of hypertension and PCOS currently presenting with left-sided flank and left lower quadrant pain  Based on history and evaluation differential diagnosis includes:  Renal stone, ovarian torsion, diverticulitis    Plan:  CBC, CMP, urine, urine preg, CT of the abdomen pelvis  CT demonstrates epiploic appendagitis  We discussed this finding with the patient, and that we believe this is the cause of her symptoms  We explained that she should manage this with ibuprofen for pain  We also prescribed zofran for nausea  We recommend follow up with her primary care physician within the next 3 days  She seems to understand this plan and is agreeable  Return precautions given and patient discharged home         Amount and/or Complexity of Data Reviewed  Clinical lab tests: ordered and reviewed  Tests in the radiology section of CPT®: ordered and reviewed  Review and summarize past medical records: yes  Independent visualization of images, tracings, or specimens: yes    Risk of Complications, Morbidity, and/or Mortality  Presenting problems: moderate  Diagnostic procedures: low  Management options: low    Patient Progress  Patient progress: stable      Disposition  Final diagnoses:   Epiploic appendagitis   Nausea     Time reflects when diagnosis was documented in both MDM as applicable and the Disposition within this note     Time User Action Codes Description Comment    8/28/2021 10:54 PM Deepti Hyman Halsted [W08 53] Epiploic appendagitis     8/28/2021 10:56 PM Melani Oh Add [R11 0] Nausea       ED Disposition     ED Disposition Condition Date/Time Comment    Discharge Stable Sat Aug 28, 2021 10:59 PM [address] discharge to home/self care  Follow-up Information     Follow up With Specialties Details Why Contact Shiva Cruz,  Internal Medicine In 3 days  [address]  [phone]            Discharge Medication List as of 8/28/2021 11:01 PM      START taking these medications    Details   ondansetron (ZOFRAN-ODT) 4 mg disintegrating tablet Take 1 tablet (4 mg total) by mouth every 6 (six) hours as needed for nausea or vomiting for up to 14 doses, Starting Sat 8/28/2021, Normal         CONTINUE these medications which have NOT CHANGED    Details   ergocalciferol (VITAMIN D2) 50,000 units Take 1 capsule (50,000 Units total) by mouth once a week, Starting Mon 6/14/2021, Normal      gabapentin (NEURONTIN) 300 mg capsule Take 3 capsules (900 mg total) by mouth daily at bedtime, Starting Wed 6/9/2021, Normal      Junel FE 1/20 1-20 MG-MCG per tablet Starting Wed 6/23/2021, Historical Med      loratadine (CLARITIN) 10 mg tablet Take 10 mg by mouth daily, Starting Mon 5/24/2021, Historical Med      meloxicam (MOBIC) 15 mg tablet Take 15 mg by mouth daily as needed, Starting Thu 3/18/2021, Historical Med      methylPREDNISolone 4 MG tablet therapy pack Use as directed on package, Normal      montelukast (SINGULAIR) 10 mg tablet Starting Tue 5/25/2021, Historical Med      spironolactone (ALDACTONE) 25 mg tablet Take 25 mg by mouth daily, Historical Med      traMADol (ULTRAM) 50 mg tablet TAKE 1 TABLET BY MOUTH FOUR TIMES A DAY AS NEEDED FOR MODERATE TO SEVERE PAIN, Historical Med           No discharge procedures on file      PDMP Review       Value Time User    PDMP Reviewed  Yes 3/4/2021 12:38 AM Nancy Murphy MD           ED Provider  Attending physically available and evaluated Maria Del Rosario Kaur I managed the patient along with the ED Attending      Electronically Signed by         Shorty Yap DO  08/29/21 7593

## 2021-08-29 NOTE — DISCHARGE INSTRUCTIONS
You were seen in the emergency department today with left-sided abdominal pain  We did a CT of your abdomen, and found epiploic appendagitis  We gave you Toradol in the emergency department for pain  We recommend ibuprofen every 6-8 hours to manage your pain when she or her pain  We also recommend Zofran for nausea as needed, and have sent a prescription to your pharmacy  We recommend follow-up with your primary care physician  Please call them with the next 3 days to see if they would like to schedule an appointment  Please return to the emergency department if you experience any of the following:  Worsening or uncontrolled pain, fever, chills, uncontrolled nausea or vomiting, or any other concerns

## 2021-09-08 ENCOUNTER — PREP FOR PROCEDURE (OUTPATIENT)
Dept: BARIATRICS | Facility: CLINIC | Age: 26
End: 2021-09-08

## 2021-09-08 DIAGNOSIS — I10 ESSENTIAL HYPERTENSION: ICD-10-CM

## 2021-09-08 DIAGNOSIS — E66.01 MORBID OBESITY WITH BMI OF 45.0-49.9, ADULT (HCC): Primary | ICD-10-CM

## 2021-09-08 DIAGNOSIS — I10 ESSENTIAL HYPERTENSION, BENIGN: ICD-10-CM

## 2021-09-08 DIAGNOSIS — E28.2 PCOS (POLYCYSTIC OVARIAN SYNDROME): ICD-10-CM

## 2021-09-08 DIAGNOSIS — E66.01 MORBID OBESITY WITH BMI OF 40.0-44.9, ADULT (HCC): Primary | ICD-10-CM

## 2021-09-08 DIAGNOSIS — Z99.89 OSA ON CPAP: ICD-10-CM

## 2021-09-08 DIAGNOSIS — Z01.810 PREOP CARDIOVASCULAR EXAM: ICD-10-CM

## 2021-09-08 DIAGNOSIS — G47.33 OSA (OBSTRUCTIVE SLEEP APNEA): ICD-10-CM

## 2021-09-08 DIAGNOSIS — G47.33 OSA ON CPAP: ICD-10-CM

## 2021-09-16 ENCOUNTER — OFFICE VISIT (OUTPATIENT)
Dept: BARIATRICS | Facility: CLINIC | Age: 26
End: 2021-09-16

## 2021-09-16 VITALS — HEIGHT: 65 IN | BODY MASS INDEX: 44.07 KG/M2 | WEIGHT: 264.5 LBS

## 2021-09-16 DIAGNOSIS — E66.01 OBESITY, CLASS III, BMI 40-49.9 (MORBID OBESITY) (HCC): Primary | ICD-10-CM

## 2021-09-16 PROCEDURE — RECHECK

## 2021-09-16 NOTE — PROGRESS NOTES
Bariatric Follow Up Nutrition Note    Preop 10/4 weight checks  Preop Program    Type of surgery  Preop  Surgery Date: TBD    Surgeon: Dr Oswaldo Mcclellan  27 y o   female  Height 5' 5" (1 651 m), weight 120 kg (264 lb 8 oz), not currently breastfeeding  Body mass index is 44 02 kg/m²  Review of History and Medications   Past Medical History:   Diagnosis Date    Hypertension     Obesity     PCOS (polycystic ovarian syndrome)      Past Surgical History:   Procedure Laterality Date    NO PAST SURGERIES       Social History     Socioeconomic History    Marital status: /Civil Union     Spouse name: Not on file    Number of children: Not on file    Years of education: Not on file    Highest education level: Not on file   Occupational History    Not on file   Tobacco Use    Smoking status: Never Smoker    Smokeless tobacco: Former User    Tobacco comment: patient admits to hx of 1 or 2 cigarettes a year  Used hookah   Vaping Use    Vaping Use: Never used   Substance and Sexual Activity    Alcohol use: No    Drug use: No    Sexual activity: Yes     Partners: Male     Birth control/protection: OCP   Other Topics Concern    Not on file   Social History Narrative    Not on file     Social Determinants of Health     Financial Resource Strain:     Difficulty of Paying Living Expenses:    Food Insecurity:     Worried About Running Out of Food in the Last Year:     [address] in the Last Year:    Transportation Needs:     Lack of Transportation (Medical):      Lack of Transportation (Non-Medical):    Physical Activity:     Days of Exercise per Week:     Minutes of Exercise per Session:    Stress:     Feeling of Stress :    Social Connections:     Frequency of Communication with Friends and Family:     Frequency of Social Gatherings with Friends and Family:     Attends Hindu Services:     Active Member of Clubs or Organizations:     Attends Club or Organization Meetings:     Marital Status:    Intimate Partner Violence:     Fear of Current or Ex-Partner:     Emotionally Abused:     Physically Abused:     Sexually Abused:        Current Outpatient Medications:     ergocalciferol (VITAMIN D2) 50,000 units, Take 1 capsule (50,000 Units total) by mouth once a week, Disp: 12 capsule, Rfl: 2    gabapentin (NEURONTIN) 300 mg capsule, Take 3 capsules (900 mg total) by mouth daily at bedtime, Disp: 90 capsule, Rfl: 3    Junel FE 1/20 1-20 MG-MCG per tablet, , Disp: , Rfl:     loratadine (CLARITIN) 10 mg tablet, Take 10 mg by mouth daily, Disp: , Rfl:     meloxicam (MOBIC) 15 mg tablet, Take 15 mg by mouth daily as needed, Disp: , Rfl:     methylPREDNISolone 4 MG tablet therapy pack, Use as directed on package, Disp: 21 tablet, Rfl: 0    montelukast (SINGULAIR) 10 mg tablet, , Disp: , Rfl:     ondansetron (ZOFRAN-ODT) 4 mg disintegrating tablet, Take 1 tablet (4 mg total) by mouth every 6 (six) hours as needed for nausea or vomiting for up to 14 doses, Disp: 14 tablet, Rfl: 0    spironolactone (ALDACTONE) 25 mg tablet, Take 25 mg by mouth daily, Disp: , Rfl:     traMADol (ULTRAM) 50 mg tablet, TAKE 1 TABLET BY MOUTH FOUR TIMES A DAY AS NEEDED FOR MODERATE TO SEVERE PAIN, Disp: , Rfl:     Food Intake and Lifestyle Assessment   Food Intake Assessment completed via usual diet recall  Breakfast: Premier Shake or grapes, cheese and cracker or almonds  Snack: 0   Lunch: grapes with cheese or yogurt parfait  Snack: 0  Dinner: grilled chicken, if sandwich only 1 piece of bread or another protein shake  Snack: 0  Beverage intake: water, and coffee/tea  Diet texture/stage: regular  Protein supplement: 0  Estimated protein intake per day: 60 gm  Estimated fluid intake per day: 64 oz water and 8 oz coffee or none  Meals eaten away from home: almost daily  Typical meal pattern: 1 meals per day and 0 snacks per day  Eating Behaviors: increasing protein, water, decreased caffeine    Food allergies or intolerances: none  Cultural or Jewish considerations: [place]    Physical Assessment  Nutrition Related Findings  Not making changes yet    Physical Activity  Types of exercise: None-trying to move more-going up steps more, walking around the block  Current physical limitations: none    Psychosocial Assessment   Support systems: spouse  Socioeconomic factors: works 3 jobs-real estate, [employer]  Full time in [facility], [facility] & [employer] [employer] as needed    Nutrition Diagnosis  Diagnosis: Overweight / Obesity (NC-3 3)  Related to: Physical inactivity and Excessive energy intake  As Evidenced by: BMI >25     Interventions and Teaching   Patient educated on post-op nutrition guidelines  Patient educated and handouts provided    Surgical changes to stomach / GI  Capacity of post-surgery stomach  Diet progression  Adequate hydration  Sugar and fat restriction to decrease "dumping syndrome"  Fat restriction to decrease steatorrhea  Expected weight loss  Weight loss plateaus/ possibility of weight regain  Exercise  Suggestions for pre-op diet  Nutrition considerations after surgery  Protein supplements  Meal planning and preparation  Appropriate carbohydrate, protein, and fat intake, and food/fluid choices to maximize safe weight loss, nutrient intake, and tolerance   Dietary and lifestyle changes  Possible problems with poor eating habits  Intuitive eating  Techniques for self monitoring and keeping daily food journal  Potential for food intolerance after surgery, and ways to deal with them including: lactose intolerance, nausea, reflux, vomiting, diarrhea, food intolerance, appetite changes, gas  Vitamin / Mineral supplementation of Multivitamin with minerals and Vitamin D    Education provided to: patient    Barriers to learning: No barriers identified    Readiness to change: preparation    Comprehension: verbalizes understanding     Expected Compliance: good    Goals  Eliminate sugar sweetened beverages, Food journal, Exercise 30 minutes 5 times per week, Complete lession plans 1-6 and Eat 3 meals per day   Discussed liver shrinking diet       Time Spent:   30 Minutes

## 2021-09-22 ENCOUNTER — OFFICE VISIT (OUTPATIENT)
Dept: RHEUMATOLOGY | Facility: CLINIC | Age: 26
End: 2021-09-22
Payer: COMMERCIAL

## 2021-09-22 VITALS
BODY MASS INDEX: 43.99 KG/M2 | DIASTOLIC BLOOD PRESSURE: 86 MMHG | WEIGHT: 264 LBS | SYSTOLIC BLOOD PRESSURE: 140 MMHG | HEIGHT: 65 IN

## 2021-09-22 DIAGNOSIS — R70.0 ELEVATED SED RATE: ICD-10-CM

## 2021-09-22 DIAGNOSIS — R79.82 ELEVATED C-REACTIVE PROTEIN: ICD-10-CM

## 2021-09-22 DIAGNOSIS — M79.604 PAIN IN BOTH LOWER EXTREMITIES: Primary | ICD-10-CM

## 2021-09-22 DIAGNOSIS — L65.9 HAIR LOSS: ICD-10-CM

## 2021-09-22 DIAGNOSIS — M79.605 PAIN IN BOTH LOWER EXTREMITIES: Primary | ICD-10-CM

## 2021-09-22 DIAGNOSIS — R20.0 NUMBNESS: ICD-10-CM

## 2021-09-22 PROCEDURE — 99214 OFFICE O/P EST MOD 30 MIN: CPT | Performed by: INTERNAL MEDICINE

## 2021-09-24 ENCOUNTER — OFFICE VISIT (OUTPATIENT)
Dept: CARDIOLOGY CLINIC | Facility: CLINIC | Age: 26
End: 2021-09-24
Payer: COMMERCIAL

## 2021-09-24 VITALS
WEIGHT: 266.9 LBS | HEIGHT: 65 IN | SYSTOLIC BLOOD PRESSURE: 120 MMHG | BODY MASS INDEX: 44.47 KG/M2 | HEART RATE: 90 BPM | DIASTOLIC BLOOD PRESSURE: 84 MMHG

## 2021-09-24 DIAGNOSIS — I10 ESSENTIAL HYPERTENSION: ICD-10-CM

## 2021-09-24 DIAGNOSIS — E66.01 MORBID OBESITY WITH BMI OF 45.0-49.9, ADULT (HCC): ICD-10-CM

## 2021-09-24 DIAGNOSIS — E78.5 HYPERLIPIDEMIA, UNSPECIFIED HYPERLIPIDEMIA TYPE: ICD-10-CM

## 2021-09-24 DIAGNOSIS — Z01.818 PRE-OPERATIVE CLEARANCE: Primary | ICD-10-CM

## 2021-09-24 DIAGNOSIS — G47.33 OSA (OBSTRUCTIVE SLEEP APNEA): ICD-10-CM

## 2021-09-24 DIAGNOSIS — I10 HYPERTENSION, UNSPECIFIED TYPE: ICD-10-CM

## 2021-09-24 DIAGNOSIS — Z01.810 PREOP CARDIOVASCULAR EXAM: ICD-10-CM

## 2021-09-24 PROCEDURE — 99214 OFFICE O/P EST MOD 30 MIN: CPT | Performed by: INTERNAL MEDICINE

## 2021-09-24 PROCEDURE — 93000 ELECTROCARDIOGRAM COMPLETE: CPT | Performed by: INTERNAL MEDICINE

## 2021-09-24 NOTE — PROGRESS NOTES
Cardiology    Clinic Visit Note  Araceli Posada 27 y o  female   MRN: [id number]    Assessment and Plan      Diagnoses and all orders for this visit:    1  Pre-operative clearance - [facility]  Patient does not take insulin, no CKD, she is not limited in her physical activities, no CHF/MI/CVA history  She is class II (moderate risk)  No further cardiac testing would change this risk  She has greater than 4 metabolic equivalents  No further pre-operative cardiac testing indicated  -     POCT ECG    2  Hypertension, unspecified type - Patient's last blood pressure in office was 142, she previously was on atenolol - now is not taking any anti hypertensive medicines  She is well controlled in the office  She takes her blood pressure at home and it is in acceptable range  At this time would not start any antihypertensive medications  3  Hyperlipidemia, unspecified hyperlipidemia type - Patient's last lipid panel showed a LDL of 142, she does not meet criteria to start statin  Recommended to patient that this is rechecked after her surgery  Schedule a follow-up appointment as needed  Chief Complaint: Pre operative clearance     Subjective     History of Present Illness:  1This is a 75-year-old female with a past medical history significant for hypertension, obstructive sleep apnea, polycystic ovarian syndrome, hyperlipidemia who follows with Dr Berenice Tiwari who presents for preoperative clearance   Patient previously saw Dr Berenice Tiwari in February of this year, that time he ordered a stress test to assess functional capacity in which she ran for 8 minutes primary 10 metabolic equivalents, and obtain 93% maximal predicted heart rate   The EKG was negative for ischemia   She currently is taking nothing for her high blood pressure she is following with Rheumatology and has labs pending from them   She is scheduled for a thyroid biopsy    She reports today she feels well  She is scheduled for a gastric sleeve procedure November 2nd  She denies any complaints, no chest pain, no shortness of breath, no fevers, no chills, no history of CAD  No family history of CAD  She is able to ambulate up flights of stairs without any difficulty  She reports that her blood pressure at home is usually in the 856 systolic  Never above 830 systolic  She denies any symptoms with it  In regards to her HLD she has not been on treatment before last LDL was 142  Rose Spinner Previous cardiovascular workup:  Exercise stress test negative for ischemia 2021    No history of echocardiogram  Previous EKG shows normal sinus rhythm  Cholesterol panel 2020:   Review of Systems   Constitutional: Negative for fever  Respiratory: Negative for shortness of breath  Cardiovascular: Negative for chest pain  Musculoskeletal: Negative for back pain  Skin: Negative for rash  Allergic/Immunologic: Negative for environmental allergies  Neurological: Negative for dizziness  Psychiatric/Behavioral: Negative for agitation           Current Outpatient Medications:     ergocalciferol (VITAMIN D2) 50,000 units, Take 1 capsule (50,000 Units total) by mouth once a week, Disp: 12 capsule, Rfl: 2    ondansetron (ZOFRAN-ODT) 4 mg disintegrating tablet, Take 1 tablet (4 mg total) by mouth every 6 (six) hours as needed for nausea or vomiting for up to 14 doses, Disp: 14 tablet, Rfl: 0    gabapentin (NEURONTIN) 300 mg capsule, Take 3 capsules (900 mg total) by mouth daily at bedtime (Patient not taking: Reported on 9/24/2021), Disp: 90 capsule, Rfl: 3    Junel FE 1/20 1-20 MG-MCG per tablet, , Disp: , Rfl:     loratadine (CLARITIN) 10 mg tablet, Take 10 mg by mouth daily (Patient not taking: Reported on 9/22/2021), Disp: , Rfl:     meloxicam (MOBIC) 15 mg tablet, Take 15 mg by mouth daily as needed (Patient not taking: Reported on 9/22/2021), Disp: , Rfl:     methylPREDNISolone 4 MG tablet therapy pack, Use as directed on package (Patient not taking: Reported on 9/22/2021), Disp: 21 tablet, Rfl: 0    montelukast (SINGULAIR) 10 mg tablet, , Disp: , Rfl:     spironolactone (ALDACTONE) 25 mg tablet, Take 25 mg by mouth daily (Patient not taking: Reported on 9/22/2021), Disp: , Rfl:     traMADol (ULTRAM) 50 mg tablet, TAKE 1 TABLET BY MOUTH FOUR TIMES A DAY AS NEEDED FOR MODERATE TO SEVERE PAIN (Patient not taking: Reported on 9/24/2021), Disp: , Rfl:   Past Medical History:   Diagnosis Date    Hypertension     Obesity     PCOS (polycystic ovarian syndrome)      Past Surgical History:   Procedure Laterality Date    NO PAST SURGERIES       Social History     Socioeconomic History    Marital status: /Civil Union     Spouse name: Not on file    Number of children: Not on file    Years of education: Not on file    Highest education level: Not on file   Occupational History    Not on file   Tobacco Use    Smoking status: Never Smoker    Smokeless tobacco: Never Used    Tobacco comment: patient admits to hx of 1 or 2 cigarettes a year  Used hookah   Vaping Use    Vaping Use: Never used   Substance and Sexual Activity    Alcohol use: No    Drug use: No    Sexual activity: Yes     Partners: Male     Birth control/protection: OCP   Other Topics Concern    Not on file   Social History Narrative    Not on file     Social Determinants of Health     Financial Resource Strain:     Difficulty of Paying Living Expenses:    Food Insecurity:     Worried About Running Out of Food in the Last Year:     [address] in the Last Year:    Transportation Needs:     Lack of Transportation (Medical):      Lack of Transportation (Non-Medical):    Physical Activity:     Days of Exercise per Week:     Minutes of Exercise per Session:    Stress:     Feeling of Stress :    Social Connections:     Frequency of Communication with Friends and Family:     Frequency of Social Gatherings with Friends and Family:     Attends Confucianism Services:     Active Member of Clubs or Organizations:     Attends Club or Organization Meetings:     Marital Status:    Intimate Partner Violence:     Fear of Current or Ex-Partner:     Emotionally Abused:     Physically Abused:     Sexually Abused:      Family History   Problem Relation Age of Onset    Thyroid disease Mother     Sarcoidosis Mother     Hypertension Father     Diabetes Father     Hyperlipidemia Father     Cancer Neg Hx     Stroke Neg Hx      No Known Allergies    Objective     Vitals:    09/24/21 1310   BP: 120/84   BP Location: Left arm   Patient Position: Sitting   Cuff Size: Large   Pulse: 90   Weight: 121 kg (266 lb 14 4 oz)   Height: 5' 5" (1 651 m)       Physical exam:     GENERAL: NAD   HEENT:  NC/AT  CARDIAC:  RRR, +S1/S2, no S3/S4 heard, no m/g/r  PULMONARY:  CTA B/L, no wheezing/rales/rhonci, non-labored breathing  ABDOMEN:  Soft, NT/ND,no rebound/guarding/rigidity  Extremities:  No edema, cyanosis, or clubbing  NEUROLOGIC: Grossly intact  SKIN:  No rashes or erythema noted on exposed skin  Psych: Normal affect        ==  PLEASE NOTE:  This encounter was completed utilizing the [employer] Direct Speech Voice Recognition Software  Grammatical errors, random word insertions, pronoun errors and incomplete sentences are occasional consequences of the system due to software limitations, ambient noise and hardware issues  These may be missed by proof reading prior to affixing electronic signature  Any questions or concerns about the content, text or information contained within the body of this dictation should be directly addressed to the physician for clarification  Please do not hesitate to call me directly if you have any any questions or concerns

## 2021-10-04 ENCOUNTER — HOSPITAL ENCOUNTER (OUTPATIENT)
Dept: RADIOLOGY | Facility: HOSPITAL | Age: 26
Discharge: HOME | End: 2021-10-04
Payer: COMMERCIAL

## 2021-10-04 DIAGNOSIS — E04.1 THYROID NODULE: ICD-10-CM

## 2021-10-04 PROCEDURE — 88172 CYTP DX EVAL FNA 1ST EA SITE: CPT | Performed by: SPECIALIST

## 2021-10-04 PROCEDURE — 88173 CYTOPATH EVAL FNA REPORT: CPT | Performed by: SPECIALIST

## 2021-10-04 PROCEDURE — 10005 FNA BX W/US GDN 1ST LES: CPT

## 2021-10-04 RX ORDER — LIDOCAINE HYDROCHLORIDE 10 MG/ML
2 INJECTION, SOLUTION EPIDURAL; INFILTRATION; INTRACAUDAL; PERINEURAL ONCE
Status: COMPLETED | OUTPATIENT
Start: 2021-10-04 | End: 2021-10-04

## 2021-10-04 RX ADMIN — LIDOCAINE HYDROCHLORIDE 2 ML: 10 INJECTION, SOLUTION EPIDURAL; INFILTRATION; INTRACAUDAL; PERINEURAL at 12:12

## 2021-10-14 ENCOUNTER — OFFICE VISIT (OUTPATIENT)
Dept: BARIATRICS | Facility: CLINIC | Age: 26
End: 2021-10-14
Payer: COMMERCIAL

## 2021-10-14 ENCOUNTER — CLINICAL SUPPORT (OUTPATIENT)
Dept: BARIATRICS | Facility: CLINIC | Age: 26
End: 2021-10-14

## 2021-10-14 VITALS
HEIGHT: 65 IN | WEIGHT: 266 LBS | SYSTOLIC BLOOD PRESSURE: 124 MMHG | DIASTOLIC BLOOD PRESSURE: 70 MMHG | BODY MASS INDEX: 44.32 KG/M2 | HEART RATE: 125 BPM | TEMPERATURE: 97.5 F

## 2021-10-14 DIAGNOSIS — E11.69 DIABETES MELLITUS TYPE 2 IN OBESE (HCC): ICD-10-CM

## 2021-10-14 DIAGNOSIS — E66.01 OBESITY, CLASS III, BMI 40-49.9 (MORBID OBESITY) (HCC): Primary | ICD-10-CM

## 2021-10-14 DIAGNOSIS — E66.9 DIABETES MELLITUS TYPE 2 IN OBESE (HCC): ICD-10-CM

## 2021-10-14 PROCEDURE — 99213 OFFICE O/P EST LOW 20 MIN: CPT | Performed by: SURGERY

## 2021-10-14 PROCEDURE — RECHECK: Performed by: DIETITIAN, REGISTERED

## 2021-10-14 RX ORDER — PHENTERMINE HYDROCHLORIDE 37.5 MG/1
TABLET ORAL
COMMUNITY
Start: 2021-10-08 | End: 2021-10-14

## 2021-10-14 RX ORDER — SCOLOPAMINE TRANSDERMAL SYSTEM 1 MG/1
1 PATCH, EXTENDED RELEASE TRANSDERMAL ONCE
Status: CANCELLED | OUTPATIENT
Start: 2021-11-22 | End: 2021-10-14

## 2021-10-14 RX ORDER — GABAPENTIN 300 MG/1
600 CAPSULE ORAL ONCE
Status: CANCELLED | OUTPATIENT
Start: 2021-11-22 | End: 2021-10-14

## 2021-10-14 RX ORDER — TOPIRAMATE 25 MG/1
TABLET ORAL
COMMUNITY
Start: 2021-09-26 | End: 2021-10-14

## 2021-10-14 RX ORDER — ACETAMINOPHEN 325 MG/1
975 TABLET ORAL ONCE
Status: CANCELLED | OUTPATIENT
Start: 2021-11-22 | End: 2021-10-14

## 2021-10-14 RX ORDER — CEFAZOLIN SODIUM 2 G/50ML
2000 SOLUTION INTRAVENOUS ONCE
Status: CANCELLED | OUTPATIENT
Start: 2021-11-22 | End: 2021-10-14

## 2021-10-14 RX ORDER — CELECOXIB 200 MG/1
200 CAPSULE ORAL ONCE
Status: CANCELLED | OUTPATIENT
Start: 2021-11-22 | End: 2021-10-14

## 2021-10-15 DIAGNOSIS — E66.01 MORBID OBESITY WITH BMI OF 45.0-49.9, ADULT (HCC): Primary | ICD-10-CM

## 2021-10-15 RX ORDER — OXYCODONE HYDROCHLORIDE 5 MG/1
5 TABLET ORAL EVERY 4 HOURS PRN
Qty: 10 TABLET | Refills: 0 | Status: SHIPPED | OUTPATIENT
Start: 2021-11-22 | End: 2022-03-21

## 2021-10-15 RX ORDER — OMEPRAZOLE 20 MG/1
20 CAPSULE, DELAYED RELEASE ORAL DAILY
Qty: 30 CAPSULE | Refills: 3 | Status: SHIPPED | OUTPATIENT
Start: 2021-10-15 | End: 2022-03-28 | Stop reason: ALTCHOICE

## 2021-10-26 ENCOUNTER — APPOINTMENT (OUTPATIENT)
Dept: LAB | Facility: HOSPITAL | Age: 26
End: 2021-10-26
Attending: INTERNAL MEDICINE
Payer: COMMERCIAL

## 2021-10-26 ENCOUNTER — APPOINTMENT (OUTPATIENT)
Dept: LAB | Facility: CLINIC | Age: 26
DRG: 621 | End: 2021-10-26
Payer: COMMERCIAL

## 2021-10-26 ENCOUNTER — APPOINTMENT (OUTPATIENT)
Dept: LAB | Facility: CLINIC | Age: 26
End: 2021-10-26
Payer: COMMERCIAL

## 2021-10-26 DIAGNOSIS — R19.7 DIARRHEA, UNSPECIFIED TYPE: ICD-10-CM

## 2021-10-26 DIAGNOSIS — R10.10 UPPER ABDOMINAL PAIN: ICD-10-CM

## 2021-10-26 LAB
CRP SERPL QL: 18.1 MG/L
IGA SERPL-MCNC: 207 MG/DL (ref 70–400)

## 2021-10-26 PROCEDURE — 83516 IMMUNOASSAY NONANTIBODY: CPT

## 2021-10-26 PROCEDURE — 36415 COLL VENOUS BLD VENIPUNCTURE: CPT

## 2021-10-26 PROCEDURE — 86140 C-REACTIVE PROTEIN: CPT

## 2021-10-26 PROCEDURE — 82784 ASSAY IGA/IGD/IGG/IGM EACH: CPT

## 2021-10-26 PROCEDURE — 83993 ASSAY FOR CALPROTECTIN FECAL: CPT

## 2021-10-27 LAB
TTG IGA SER-ACNC: <2 U/ML (ref 0–3)
TTG IGG SER-ACNC: 5 U/ML (ref 0–5)

## 2021-10-28 LAB — CALPROTECTIN STL-MCNT: 39 UG/G (ref 0–120)

## 2021-11-11 ENCOUNTER — OFFICE VISIT (OUTPATIENT)
Dept: BARIATRICS | Facility: CLINIC | Age: 26
End: 2021-11-11
Payer: COMMERCIAL

## 2021-11-11 VITALS
HEART RATE: 91 BPM | BODY MASS INDEX: 45.07 KG/M2 | TEMPERATURE: 97.6 F | WEIGHT: 270.5 LBS | SYSTOLIC BLOOD PRESSURE: 122 MMHG | HEIGHT: 65 IN | DIASTOLIC BLOOD PRESSURE: 80 MMHG

## 2021-11-11 DIAGNOSIS — E66.01 MORBID OBESITY WITH BMI OF 45.0-49.9, ADULT (HCC): Primary | ICD-10-CM

## 2021-11-11 DIAGNOSIS — Z01.818 PREOP TESTING: ICD-10-CM

## 2021-11-11 DIAGNOSIS — E66.01 MORBID (SEVERE) OBESITY DUE TO EXCESS CALORIES (HCC): Primary | ICD-10-CM

## 2021-11-11 PROCEDURE — 99213 OFFICE O/P EST LOW 20 MIN: CPT | Performed by: SURGERY

## 2021-11-11 PROCEDURE — RECHECK: Performed by: DIETITIAN, REGISTERED

## 2021-11-11 RX ORDER — CELECOXIB 200 MG/1
200 CAPSULE ORAL ONCE
Status: CANCELLED | OUTPATIENT
Start: 2021-11-22 | End: 2021-11-11

## 2021-11-11 RX ORDER — GABAPENTIN 300 MG/1
600 CAPSULE ORAL ONCE
Status: CANCELLED | OUTPATIENT
Start: 2021-11-22 | End: 2021-11-11

## 2021-11-11 RX ORDER — ACETAMINOPHEN 325 MG/1
975 TABLET ORAL ONCE
Status: CANCELLED | OUTPATIENT
Start: 2021-11-22 | End: 2021-11-11

## 2021-11-11 RX ORDER — CEFAZOLIN SODIUM 2 G/50ML
2000 SOLUTION INTRAVENOUS ONCE
Status: CANCELLED | OUTPATIENT
Start: 2021-11-22 | End: 2021-11-11

## 2021-11-11 RX ORDER — SCOLOPAMINE TRANSDERMAL SYSTEM 1 MG/1
1 PATCH, EXTENDED RELEASE TRANSDERMAL ONCE
Status: CANCELLED | OUTPATIENT
Start: 2021-11-22 | End: 2021-11-11

## 2021-11-15 DIAGNOSIS — E66.01 MORBID OBESITY WITH BMI OF 45.0-49.9, ADULT (HCC): ICD-10-CM

## 2021-11-19 ENCOUNTER — ANESTHESIA EVENT (OUTPATIENT)
Dept: PERIOP | Facility: HOSPITAL | Age: 26
DRG: 621 | End: 2021-11-19
Payer: COMMERCIAL

## 2021-11-22 ENCOUNTER — ANESTHESIA (OUTPATIENT)
Dept: PERIOP | Facility: HOSPITAL | Age: 26
DRG: 621 | End: 2021-11-22
Payer: COMMERCIAL

## 2021-11-22 ENCOUNTER — HOSPITAL ENCOUNTER (INPATIENT)
Facility: HOSPITAL | Age: 26
LOS: 1 days | Discharge: HOME | DRG: 621 | End: 2021-11-23
Attending: SURGERY | Admitting: SURGERY
Payer: COMMERCIAL

## 2021-11-22 DIAGNOSIS — Z99.89 OSA ON CPAP: ICD-10-CM

## 2021-11-22 DIAGNOSIS — I10 ESSENTIAL HYPERTENSION, BENIGN: ICD-10-CM

## 2021-11-22 DIAGNOSIS — E66.01 MORBID OBESITY WITH BMI OF 40.0-44.9, ADULT (HCC): ICD-10-CM

## 2021-11-22 DIAGNOSIS — G47.33 OSA ON CPAP: ICD-10-CM

## 2021-11-22 DIAGNOSIS — E28.2 PCOS (POLYCYSTIC OVARIAN SYNDROME): ICD-10-CM

## 2021-11-22 LAB
EXT PREGNANCY TEST URINE: NEGATIVE
EXT. CONTROL: NORMAL

## 2021-11-22 PROCEDURE — 99024 POSTOP FOLLOW-UP VISIT: CPT | Performed by: SURGERY

## 2021-11-22 PROCEDURE — 88307 TISSUE EXAM BY PATHOLOGIST: CPT | Performed by: PATHOLOGY

## 2021-11-22 PROCEDURE — 43775 LAP SLEEVE GASTRECTOMY: CPT | Performed by: SURGERY

## 2021-11-22 PROCEDURE — S2900 ROBOTIC SURGICAL SYSTEM: HCPCS | Performed by: SURGERY

## 2021-11-22 PROCEDURE — 0DB64Z3 EXCISION OF STOMACH, PERCUTANEOUS ENDOSCOPIC APPROACH, VERTICAL: ICD-10-PCS | Performed by: SURGERY

## 2021-11-22 PROCEDURE — 8E0W4CZ ROBOTIC ASSISTED PROCEDURE OF TRUNK REGION, PERCUTANEOUS ENDOSCOPIC APPROACH: ICD-10-PCS | Performed by: SURGERY

## 2021-11-22 PROCEDURE — 81025 URINE PREGNANCY TEST: CPT | Performed by: ANESTHESIOLOGY

## 2021-11-22 PROCEDURE — C9290 INJ, BUPIVACAINE LIPOSOME: HCPCS | Performed by: PHYSICIAN ASSISTANT

## 2021-11-22 RX ORDER — PROMETHAZINE HYDROCHLORIDE 25 MG/ML
25 INJECTION, SOLUTION INTRAMUSCULAR; INTRAVENOUS EVERY 6 HOURS PRN
Status: DISCONTINUED | OUTPATIENT
Start: 2021-11-22 | End: 2021-11-23 | Stop reason: HOSPADM

## 2021-11-22 RX ORDER — ROCURONIUM BROMIDE 10 MG/ML
INJECTION, SOLUTION INTRAVENOUS AS NEEDED
Status: DISCONTINUED | OUTPATIENT
Start: 2021-11-22 | End: 2021-11-22

## 2021-11-22 RX ORDER — CELECOXIB 200 MG/1
200 CAPSULE ORAL ONCE
Status: DISCONTINUED | OUTPATIENT
Start: 2021-11-22 | End: 2021-11-22 | Stop reason: HOSPADM

## 2021-11-22 RX ORDER — METOCLOPRAMIDE HYDROCHLORIDE 5 MG/ML
10 INJECTION INTRAMUSCULAR; INTRAVENOUS EVERY 6 HOURS PRN
Status: DISCONTINUED | OUTPATIENT
Start: 2021-11-22 | End: 2021-11-23 | Stop reason: HOSPADM

## 2021-11-22 RX ORDER — SCOLOPAMINE TRANSDERMAL SYSTEM 1 MG/1
1 PATCH, EXTENDED RELEASE TRANSDERMAL ONCE
Status: DISCONTINUED | OUTPATIENT
Start: 2021-11-22 | End: 2021-11-22 | Stop reason: HOSPADM

## 2021-11-22 RX ORDER — ONDANSETRON 2 MG/ML
4 INJECTION INTRAMUSCULAR; INTRAVENOUS EVERY 6 HOURS PRN
Status: DISCONTINUED | OUTPATIENT
Start: 2021-11-22 | End: 2021-11-23 | Stop reason: HOSPADM

## 2021-11-22 RX ORDER — ACETAMINOPHEN 160 MG/5ML
975 SUSPENSION, ORAL (FINAL DOSE FORM) ORAL EVERY 8 HOURS SCHEDULED
Status: DISCONTINUED | OUTPATIENT
Start: 2021-11-22 | End: 2021-11-23 | Stop reason: HOSPADM

## 2021-11-22 RX ORDER — FENTANYL CITRATE 50 UG/ML
INJECTION, SOLUTION INTRAMUSCULAR; INTRAVENOUS AS NEEDED
Status: DISCONTINUED | OUTPATIENT
Start: 2021-11-22 | End: 2021-11-22

## 2021-11-22 RX ORDER — PROMETHAZINE HYDROCHLORIDE 25 MG/ML
12.5 INJECTION, SOLUTION INTRAMUSCULAR; INTRAVENOUS ONCE AS NEEDED
Status: COMPLETED | OUTPATIENT
Start: 2021-11-22 | End: 2021-11-22

## 2021-11-22 RX ORDER — OXYCODONE HCL 5 MG/5 ML
10 SOLUTION, ORAL ORAL EVERY 4 HOURS PRN
Status: DISCONTINUED | OUTPATIENT
Start: 2021-11-22 | End: 2021-11-23 | Stop reason: HOSPADM

## 2021-11-22 RX ORDER — ACETAMINOPHEN 325 MG/1
975 TABLET ORAL ONCE
Status: COMPLETED | OUTPATIENT
Start: 2021-11-22 | End: 2021-11-22

## 2021-11-22 RX ORDER — BUPIVACAINE HYDROCHLORIDE 5 MG/ML
INJECTION, SOLUTION PERINEURAL AS NEEDED
Status: DISCONTINUED | OUTPATIENT
Start: 2021-11-22 | End: 2021-11-22 | Stop reason: HOSPADM

## 2021-11-22 RX ORDER — CEFAZOLIN SODIUM 2 G/50ML
2000 SOLUTION INTRAVENOUS ONCE
Status: COMPLETED | OUTPATIENT
Start: 2021-11-22 | End: 2021-11-22

## 2021-11-22 RX ORDER — NEOSTIGMINE METHYLSULFATE 1 MG/ML
INJECTION INTRAVENOUS AS NEEDED
Status: DISCONTINUED | OUTPATIENT
Start: 2021-11-22 | End: 2021-11-22

## 2021-11-22 RX ORDER — SODIUM CHLORIDE 9 MG/ML
125 INJECTION, SOLUTION INTRAVENOUS CONTINUOUS
Status: DISCONTINUED | OUTPATIENT
Start: 2021-11-22 | End: 2021-11-22

## 2021-11-22 RX ORDER — FENTANYL CITRATE/PF 50 MCG/ML
25 SYRINGE (ML) INJECTION
Status: DISCONTINUED | OUTPATIENT
Start: 2021-11-22 | End: 2021-11-22 | Stop reason: HOSPADM

## 2021-11-22 RX ORDER — LIDOCAINE HYDROCHLORIDE 20 MG/ML
INJECTION, SOLUTION EPIDURAL; INFILTRATION; INTRACAUDAL; PERINEURAL AS NEEDED
Status: DISCONTINUED | OUTPATIENT
Start: 2021-11-22 | End: 2021-11-22

## 2021-11-22 RX ORDER — ALBUTEROL SULFATE 2.5 MG/3ML
2.5 SOLUTION RESPIRATORY (INHALATION) ONCE AS NEEDED
Status: DISCONTINUED | OUTPATIENT
Start: 2021-11-22 | End: 2021-11-22 | Stop reason: HOSPADM

## 2021-11-22 RX ORDER — HYDROMORPHONE HCL/PF 1 MG/ML
SYRINGE (ML) INJECTION AS NEEDED
Status: DISCONTINUED | OUTPATIENT
Start: 2021-11-22 | End: 2021-11-22

## 2021-11-22 RX ORDER — PROPOFOL 10 MG/ML
INJECTION, EMULSION INTRAVENOUS AS NEEDED
Status: DISCONTINUED | OUTPATIENT
Start: 2021-11-22 | End: 2021-11-22

## 2021-11-22 RX ORDER — GABAPENTIN 300 MG/1
600 CAPSULE ORAL ONCE
Status: COMPLETED | OUTPATIENT
Start: 2021-11-22 | End: 2021-11-22

## 2021-11-22 RX ORDER — SCOLOPAMINE TRANSDERMAL SYSTEM 1 MG/1
1 PATCH, EXTENDED RELEASE TRANSDERMAL ONCE
Status: DISCONTINUED | OUTPATIENT
Start: 2021-11-22 | End: 2021-11-23 | Stop reason: HOSPADM

## 2021-11-22 RX ORDER — DEXAMETHASONE SODIUM PHOSPHATE 4 MG/ML
INJECTION, SOLUTION INTRA-ARTICULAR; INTRALESIONAL; INTRAMUSCULAR; INTRAVENOUS; SOFT TISSUE AS NEEDED
Status: DISCONTINUED | OUTPATIENT
Start: 2021-11-22 | End: 2021-11-22

## 2021-11-22 RX ORDER — SIMETHICONE 80 MG
80 TABLET,CHEWABLE ORAL 4 TIMES DAILY PRN
Status: DISCONTINUED | OUTPATIENT
Start: 2021-11-22 | End: 2021-11-23 | Stop reason: HOSPADM

## 2021-11-22 RX ORDER — FENTANYL CITRATE/PF 50 MCG/ML
50 SYRINGE (ML) INJECTION
Status: DISCONTINUED | OUTPATIENT
Start: 2021-11-22 | End: 2021-11-22 | Stop reason: HOSPADM

## 2021-11-22 RX ORDER — HYDROMORPHONE HCL/PF 1 MG/ML
0.5 SYRINGE (ML) INJECTION
Status: DISCONTINUED | OUTPATIENT
Start: 2021-11-22 | End: 2021-11-22 | Stop reason: HOSPADM

## 2021-11-22 RX ORDER — CELECOXIB 200 MG/1
200 CAPSULE ORAL ONCE
Status: COMPLETED | OUTPATIENT
Start: 2021-11-22 | End: 2021-11-22

## 2021-11-22 RX ORDER — DIPHENHYDRAMINE HCL 25 MG
25 TABLET ORAL EVERY 8 HOURS PRN
Status: DISCONTINUED | OUTPATIENT
Start: 2021-11-22 | End: 2021-11-23 | Stop reason: HOSPADM

## 2021-11-22 RX ORDER — ACETAMINOPHEN 325 MG/1
975 TABLET ORAL EVERY 8 HOURS SCHEDULED
Status: DISCONTINUED | OUTPATIENT
Start: 2021-11-22 | End: 2021-11-23 | Stop reason: HOSPADM

## 2021-11-22 RX ORDER — MIDAZOLAM HYDROCHLORIDE 2 MG/2ML
INJECTION, SOLUTION INTRAMUSCULAR; INTRAVENOUS AS NEEDED
Status: DISCONTINUED | OUTPATIENT
Start: 2021-11-22 | End: 2021-11-22

## 2021-11-22 RX ORDER — ONDANSETRON 2 MG/ML
4 INJECTION INTRAMUSCULAR; INTRAVENOUS ONCE AS NEEDED
Status: DISCONTINUED | OUTPATIENT
Start: 2021-11-22 | End: 2021-11-22 | Stop reason: HOSPADM

## 2021-11-22 RX ORDER — SODIUM CHLORIDE, SODIUM LACTATE, POTASSIUM CHLORIDE, CALCIUM CHLORIDE 600; 310; 30; 20 MG/100ML; MG/100ML; MG/100ML; MG/100ML
100 INJECTION, SOLUTION INTRAVENOUS CONTINUOUS
Status: DISCONTINUED | OUTPATIENT
Start: 2021-11-22 | End: 2021-11-23 | Stop reason: HOSPADM

## 2021-11-22 RX ORDER — MAGNESIUM HYDROXIDE 1200 MG/15ML
LIQUID ORAL AS NEEDED
Status: DISCONTINUED | OUTPATIENT
Start: 2021-11-22 | End: 2021-11-22 | Stop reason: HOSPADM

## 2021-11-22 RX ORDER — ONDANSETRON 2 MG/ML
INJECTION INTRAMUSCULAR; INTRAVENOUS AS NEEDED
Status: DISCONTINUED | OUTPATIENT
Start: 2021-11-22 | End: 2021-11-22

## 2021-11-22 RX ORDER — CEFAZOLIN SODIUM 2 G/50ML
2000 SOLUTION INTRAVENOUS ONCE
Status: DISCONTINUED | OUTPATIENT
Start: 2021-11-22 | End: 2021-11-22 | Stop reason: HOSPADM

## 2021-11-22 RX ORDER — ACETAMINOPHEN 325 MG/1
975 TABLET ORAL ONCE
Status: DISCONTINUED | OUTPATIENT
Start: 2021-11-22 | End: 2021-11-22 | Stop reason: HOSPADM

## 2021-11-22 RX ORDER — KETAMINE HYDROCHLORIDE 50 MG/ML
INJECTION, SOLUTION, CONCENTRATE INTRAMUSCULAR; INTRAVENOUS AS NEEDED
Status: DISCONTINUED | OUTPATIENT
Start: 2021-11-22 | End: 2021-11-22

## 2021-11-22 RX ORDER — GABAPENTIN 300 MG/1
600 CAPSULE ORAL ONCE
Status: DISCONTINUED | OUTPATIENT
Start: 2021-11-22 | End: 2021-11-22 | Stop reason: HOSPADM

## 2021-11-22 RX ORDER — OXYCODONE HCL 5 MG/5 ML
5 SOLUTION, ORAL ORAL EVERY 4 HOURS PRN
Status: DISCONTINUED | OUTPATIENT
Start: 2021-11-22 | End: 2021-11-23 | Stop reason: HOSPADM

## 2021-11-22 RX ORDER — EPHEDRINE SULFATE 50 MG/ML
INJECTION INTRAVENOUS AS NEEDED
Status: DISCONTINUED | OUTPATIENT
Start: 2021-11-22 | End: 2021-11-22

## 2021-11-22 RX ORDER — MORPHINE SULFATE 4 MG/ML
4 INJECTION, SOLUTION INTRAMUSCULAR; INTRAVENOUS EVERY 4 HOURS PRN
Status: DISCONTINUED | OUTPATIENT
Start: 2021-11-22 | End: 2021-11-23 | Stop reason: HOSPADM

## 2021-11-22 RX ORDER — GLYCOPYRROLATE 0.2 MG/ML
INJECTION INTRAMUSCULAR; INTRAVENOUS AS NEEDED
Status: DISCONTINUED | OUTPATIENT
Start: 2021-11-22 | End: 2021-11-22

## 2021-11-22 RX ADMIN — SODIUM CHLORIDE: 0.9 INJECTION, SOLUTION INTRAVENOUS at 12:23

## 2021-11-22 RX ADMIN — SODIUM CHLORIDE, SODIUM LACTATE, POTASSIUM CHLORIDE, AND CALCIUM CHLORIDE 100 ML/HR: .6; .31; .03; .02 INJECTION, SOLUTION INTRAVENOUS at 23:11

## 2021-11-22 RX ADMIN — SODIUM CHLORIDE, SODIUM LACTATE, POTASSIUM CHLORIDE, AND CALCIUM CHLORIDE 100 ML/HR: .6; .31; .03; .02 INJECTION, SOLUTION INTRAVENOUS at 13:15

## 2021-11-22 RX ADMIN — LIDOCAINE HYDROCHLORIDE 100 MG: 20 INJECTION, SOLUTION EPIDURAL; INFILTRATION; INTRACAUDAL; PERINEURAL at 10:37

## 2021-11-22 RX ADMIN — KETAMINE HYDROCHLORIDE 50 MG: 50 INJECTION INTRAMUSCULAR; INTRAVENOUS at 10:37

## 2021-11-22 RX ADMIN — PROPOFOL 200 MG: 10 INJECTION, EMULSION INTRAVENOUS at 10:37

## 2021-11-22 RX ADMIN — PROMETHAZINE HYDROCHLORIDE 12.5 MG: 25 INJECTION INTRAMUSCULAR; INTRAVENOUS at 13:07

## 2021-11-22 RX ADMIN — FENTANYL CITRATE 50 MCG: 50 INJECTION INTRAMUSCULAR; INTRAVENOUS at 11:05

## 2021-11-22 RX ADMIN — GABAPENTIN 600 MG: 300 CAPSULE ORAL at 09:21

## 2021-11-22 RX ADMIN — FAMOTIDINE 20 MG: 10 INJECTION INTRAVENOUS at 20:56

## 2021-11-22 RX ADMIN — ACETAMINOPHEN 975 MG: 325 TABLET, FILM COATED ORAL at 09:20

## 2021-11-22 RX ADMIN — ACETAMINOPHEN 975 MG: 325 TABLET, FILM COATED ORAL at 20:57

## 2021-11-22 RX ADMIN — SODIUM CHLORIDE 125 ML/HR: 0.9 INJECTION, SOLUTION INTRAVENOUS at 09:28

## 2021-11-22 RX ADMIN — FENTANYL CITRATE 100 MCG: 50 INJECTION INTRAMUSCULAR; INTRAVENOUS at 10:37

## 2021-11-22 RX ADMIN — EPHEDRINE SULFATE 10 MG: 50 INJECTION, SOLUTION INTRAVENOUS at 11:41

## 2021-11-22 RX ADMIN — SODIUM CHLORIDE: 0.9 INJECTION, SOLUTION INTRAVENOUS at 11:16

## 2021-11-22 RX ADMIN — HYDROMORPHONE HYDROCHLORIDE 0.5 MG: 1 INJECTION, SOLUTION INTRAMUSCULAR; INTRAVENOUS; SUBCUTANEOUS at 12:25

## 2021-11-22 RX ADMIN — ENOXAPARIN SODIUM 40 MG: 40 INJECTION SUBCUTANEOUS at 09:28

## 2021-11-22 RX ADMIN — ONDANSETRON 4 MG: 2 INJECTION INTRAMUSCULAR; INTRAVENOUS at 12:26

## 2021-11-22 RX ADMIN — MIDAZOLAM 2 MG: 1 INJECTION INTRAMUSCULAR; INTRAVENOUS at 10:33

## 2021-11-22 RX ADMIN — NEOSTIGMINE METHYLSULFATE 4 MG: 1 INJECTION INTRAVENOUS at 12:21

## 2021-11-22 RX ADMIN — GLYCOPYRROLATE 0.6 MG: 0.2 INJECTION, SOLUTION INTRAMUSCULAR; INTRAVENOUS at 12:21

## 2021-11-22 RX ADMIN — DEXAMETHASONE SODIUM PHOSPHATE 8 MG: 4 INJECTION INTRA-ARTICULAR; INTRALESIONAL; INTRAMUSCULAR; INTRAVENOUS; SOFT TISSUE at 10:37

## 2021-11-22 RX ADMIN — CELECOXIB 200 MG: 200 CAPSULE ORAL at 09:21

## 2021-11-22 RX ADMIN — SCOPALAMINE 1 PATCH: 1 PATCH, EXTENDED RELEASE TRANSDERMAL at 09:20

## 2021-11-22 RX ADMIN — ONDANSETRON 4 MG: 2 INJECTION INTRAMUSCULAR; INTRAVENOUS at 10:37

## 2021-11-22 RX ADMIN — FENTANYL CITRATE 50 MCG: 50 INJECTION INTRAMUSCULAR; INTRAVENOUS at 12:17

## 2021-11-22 RX ADMIN — ONDANSETRON 4 MG: 2 INJECTION INTRAMUSCULAR; INTRAVENOUS at 18:16

## 2021-11-22 RX ADMIN — ROCURONIUM BROMIDE 100 MG: 50 INJECTION, SOLUTION INTRAVENOUS at 10:37

## 2021-11-22 RX ADMIN — METOCLOPRAMIDE 10 MG: 5 INJECTION, SOLUTION INTRAMUSCULAR; INTRAVENOUS at 20:53

## 2021-11-22 RX ADMIN — CEFAZOLIN SODIUM 2000 MG: 2 SOLUTION INTRAVENOUS at 10:29

## 2021-11-23 VITALS
TEMPERATURE: 98.2 F | SYSTOLIC BLOOD PRESSURE: 121 MMHG | HEART RATE: 91 BPM | OXYGEN SATURATION: 93 % | HEIGHT: 65 IN | DIASTOLIC BLOOD PRESSURE: 81 MMHG | RESPIRATION RATE: 18 BRPM | BODY MASS INDEX: 44.78 KG/M2 | WEIGHT: 268.74 LBS

## 2021-11-23 LAB
ANION GAP SERPL CALCULATED.3IONS-SCNC: 8 MMOL/L (ref 4–13)
BUN SERPL-MCNC: 14 MG/DL (ref 5–25)
CALCIUM SERPL-MCNC: 8 MG/DL (ref 8.3–10.1)
CHLORIDE SERPL-SCNC: 105 MMOL/L (ref 100–108)
CO2 SERPL-SCNC: 23 MMOL/L (ref 21–32)
CREAT SERPL-MCNC: 0.75 MG/DL (ref 0.6–1.3)
ERYTHROCYTE [DISTWIDTH] IN BLOOD BY AUTOMATED COUNT: 13.7 % (ref 11.6–15.1)
GFR SERPL CREATININE-BSD FRML MDRD: 109 ML/MIN/1.73SQ M
GLUCOSE SERPL-MCNC: 91 MG/DL (ref 65–140)
HCT VFR BLD AUTO: 33.9 % (ref 34.8–46.1)
HCT VFR BLD AUTO: 35.1 % (ref 34.8–46.1)
HGB BLD-MCNC: 11 G/DL (ref 11.5–15.4)
HGB BLD-MCNC: 11 G/DL (ref 11.5–15.4)
MCH RBC QN AUTO: 26 PG (ref 26.8–34.3)
MCHC RBC AUTO-ENTMCNC: 31.3 G/DL (ref 31.4–37.4)
MCV RBC AUTO: 83 FL (ref 82–98)
PLATELET # BLD AUTO: 265 THOUSANDS/UL (ref 149–390)
PMV BLD AUTO: 10.3 FL (ref 8.9–12.7)
POTASSIUM SERPL-SCNC: 4 MMOL/L (ref 3.5–5.3)
RBC # BLD AUTO: 4.23 MILLION/UL (ref 3.81–5.12)
SODIUM SERPL-SCNC: 136 MMOL/L (ref 136–145)
WBC # BLD AUTO: 9.74 THOUSAND/UL (ref 4.31–10.16)

## 2021-11-23 PROCEDURE — 85018 HEMOGLOBIN: CPT | Performed by: PHYSICIAN ASSISTANT

## 2021-11-23 PROCEDURE — 85027 COMPLETE CBC AUTOMATED: CPT | Performed by: PHYSICIAN ASSISTANT

## 2021-11-23 PROCEDURE — 99024 POSTOP FOLLOW-UP VISIT: CPT | Performed by: SURGERY

## 2021-11-23 PROCEDURE — NC001 PR NO CHARGE: Performed by: SURGERY

## 2021-11-23 PROCEDURE — 80048 BASIC METABOLIC PNL TOTAL CA: CPT | Performed by: PHYSICIAN ASSISTANT

## 2021-11-23 PROCEDURE — 85014 HEMATOCRIT: CPT | Performed by: PHYSICIAN ASSISTANT

## 2021-11-23 RX ORDER — ACETAMINOPHEN 160 MG/5ML
975 SUSPENSION, ORAL (FINAL DOSE FORM) ORAL EVERY 8 HOURS SCHEDULED
Qty: 638.4 ML | Refills: 0 | Status: SHIPPED | OUTPATIENT
Start: 2021-11-23 | End: 2021-11-30

## 2021-11-23 RX ORDER — ONDANSETRON 4 MG/1
4 TABLET, ORALLY DISINTEGRATING ORAL EVERY 6 HOURS PRN
Qty: 10 TABLET | Refills: 0 | Status: SHIPPED | OUTPATIENT
Start: 2021-11-23 | End: 2022-03-28 | Stop reason: ALTCHOICE

## 2021-11-23 RX ADMIN — FAMOTIDINE 20 MG: 10 INJECTION INTRAVENOUS at 08:45

## 2021-11-23 RX ADMIN — ONDANSETRON 4 MG: 2 INJECTION INTRAMUSCULAR; INTRAVENOUS at 05:49

## 2021-11-23 RX ADMIN — ACETAMINOPHEN 975 MG: 325 TABLET, FILM COATED ORAL at 05:22

## 2021-11-23 RX ADMIN — METOCLOPRAMIDE 10 MG: 5 INJECTION, SOLUTION INTRAMUSCULAR; INTRAVENOUS at 08:45

## 2021-11-23 RX ADMIN — ONDANSETRON 4 MG: 2 INJECTION INTRAMUSCULAR; INTRAVENOUS at 13:45

## 2021-11-23 RX ADMIN — ENOXAPARIN SODIUM 40 MG: 40 INJECTION SUBCUTANEOUS at 13:56

## 2021-11-23 RX ADMIN — SODIUM CHLORIDE, SODIUM LACTATE, POTASSIUM CHLORIDE, AND CALCIUM CHLORIDE 100 ML/HR: .6; .31; .03; .02 INJECTION, SOLUTION INTRAVENOUS at 10:51

## 2021-11-23 RX ADMIN — ACETAMINOPHEN 975 MG: 325 TABLET, FILM COATED ORAL at 13:33

## 2021-11-24 ENCOUNTER — TELEPHONE (OUTPATIENT)
Dept: BARIATRICS | Facility: CLINIC | Age: 26
End: 2021-11-24

## 2021-11-25 ENCOUNTER — APPOINTMENT (EMERGENCY)
Dept: RADIOLOGY | Facility: HOSPITAL | Age: 26
End: 2021-11-25
Payer: COMMERCIAL

## 2021-11-25 ENCOUNTER — HOSPITAL ENCOUNTER (EMERGENCY)
Facility: HOSPITAL | Age: 26
Discharge: HOME | End: 2021-11-25
Attending: EMERGENCY MEDICINE | Admitting: EMERGENCY MEDICINE
Payer: COMMERCIAL

## 2021-11-25 VITALS
TEMPERATURE: 97.1 F | WEIGHT: 268 LBS | BODY MASS INDEX: 44.6 KG/M2 | DIASTOLIC BLOOD PRESSURE: 76 MMHG | SYSTOLIC BLOOD PRESSURE: 127 MMHG | HEART RATE: 66 BPM | RESPIRATION RATE: 20 BRPM | OXYGEN SATURATION: 99 %

## 2021-11-25 DIAGNOSIS — J18.9 PNEUMONIA: Primary | ICD-10-CM

## 2021-11-25 DIAGNOSIS — R11.0 NAUSEA: ICD-10-CM

## 2021-11-25 LAB
2HR DELTA HS TROPONIN: -1 NG/L
ALBUMIN SERPL BCP-MCNC: 2.9 G/DL (ref 3.5–5)
ALP SERPL-CCNC: 64 U/L (ref 46–116)
ALT SERPL W P-5'-P-CCNC: 47 U/L (ref 12–78)
ANION GAP SERPL CALCULATED.3IONS-SCNC: 6 MMOL/L (ref 4–13)
AST SERPL W P-5'-P-CCNC: 19 U/L (ref 5–45)
BACTERIA UR QL AUTO: ABNORMAL /HPF
BASOPHILS # BLD AUTO: 0.02 THOUSANDS/ΜL (ref 0–0.1)
BASOPHILS NFR BLD AUTO: 0 % (ref 0–1)
BILIRUB SERPL-MCNC: 0.55 MG/DL (ref 0.2–1)
BILIRUB UR QL STRIP: NEGATIVE
BUN SERPL-MCNC: 14 MG/DL (ref 5–25)
CALCIUM ALBUM COR SERPL-MCNC: 9.6 MG/DL (ref 8.3–10.1)
CALCIUM SERPL-MCNC: 8.7 MG/DL (ref 8.3–10.1)
CARDIAC TROPONIN I PNL SERPL HS: 2 NG/L
CARDIAC TROPONIN I PNL SERPL HS: 3 NG/L
CHLORIDE SERPL-SCNC: 107 MMOL/L (ref 100–108)
CLARITY UR: CLEAR
CO2 SERPL-SCNC: 25 MMOL/L (ref 21–32)
COLOR UR: YELLOW
CREAT SERPL-MCNC: 0.69 MG/DL (ref 0.6–1.3)
D DIMER PPP FEU-MCNC: 0.67 UG/ML FEU
EOSINOPHIL # BLD AUTO: 0.46 THOUSAND/ΜL (ref 0–0.61)
EOSINOPHIL NFR BLD AUTO: 6 % (ref 0–6)
ERYTHROCYTE [DISTWIDTH] IN BLOOD BY AUTOMATED COUNT: 13.6 % (ref 11.6–15.1)
EXT PREG TEST URINE: NEGATIVE
EXT. CONTROL ED NAV: NORMAL
FLUAV RNA RESP QL NAA+PROBE: NEGATIVE
FLUBV RNA RESP QL NAA+PROBE: NEGATIVE
GFR SERPL CREATININE-BSD FRML MDRD: 119 ML/MIN/1.73SQ M
GLUCOSE SERPL-MCNC: 83 MG/DL (ref 65–140)
GLUCOSE UR STRIP-MCNC: NEGATIVE MG/DL
HCT VFR BLD AUTO: 37.4 % (ref 34.8–46.1)
HGB BLD-MCNC: 11.8 G/DL (ref 11.5–15.4)
HGB UR QL STRIP.AUTO: ABNORMAL
HYALINE CASTS #/AREA URNS LPF: ABNORMAL /LPF
IMM GRANULOCYTES # BLD AUTO: 0.01 THOUSAND/UL (ref 0–0.2)
IMM GRANULOCYTES NFR BLD AUTO: 0 % (ref 0–2)
KETONES UR STRIP-MCNC: NEGATIVE MG/DL
LEUKOCYTE ESTERASE UR QL STRIP: NEGATIVE
LYMPHOCYTES # BLD AUTO: 1.57 THOUSANDS/ΜL (ref 0.6–4.47)
LYMPHOCYTES NFR BLD AUTO: 22 % (ref 14–44)
MCH RBC QN AUTO: 26.2 PG (ref 26.8–34.3)
MCHC RBC AUTO-ENTMCNC: 31.6 G/DL (ref 31.4–37.4)
MCV RBC AUTO: 83 FL (ref 82–98)
MONOCYTES # BLD AUTO: 0.64 THOUSAND/ΜL (ref 0.17–1.22)
MONOCYTES NFR BLD AUTO: 9 % (ref 4–12)
NEUTROPHILS # BLD AUTO: 4.5 THOUSANDS/ΜL (ref 1.85–7.62)
NEUTS SEG NFR BLD AUTO: 63 % (ref 43–75)
NITRITE UR QL STRIP: NEGATIVE
NON-SQ EPI CELLS URNS QL MICRO: ABNORMAL /HPF
NRBC BLD AUTO-RTO: 0 /100 WBCS
PH UR STRIP.AUTO: 6.5 [PH] (ref 4.5–8)
PLATELET # BLD AUTO: 224 THOUSANDS/UL (ref 149–390)
PMV BLD AUTO: 10.3 FL (ref 8.9–12.7)
POTASSIUM SERPL-SCNC: 3.5 MMOL/L (ref 3.5–5.3)
PROCALCITONIN SERPL-MCNC: <0.05 NG/ML
PROT SERPL-MCNC: 7.2 G/DL (ref 6.4–8.2)
PROT UR STRIP-MCNC: NEGATIVE MG/DL
RBC # BLD AUTO: 4.5 MILLION/UL (ref 3.81–5.12)
RBC #/AREA URNS AUTO: ABNORMAL /HPF
RSV RNA RESP QL NAA+PROBE: NEGATIVE
SARS-COV-2 RNA RESP QL NAA+PROBE: NEGATIVE
SODIUM SERPL-SCNC: 138 MMOL/L (ref 136–145)
SP GR UR STRIP.AUTO: 1.02 (ref 1–1.03)
UROBILINOGEN UR QL STRIP.AUTO: 1 E.U./DL
WBC # BLD AUTO: 7.2 THOUSAND/UL (ref 4.31–10.16)
WBC #/AREA URNS AUTO: ABNORMAL /HPF

## 2021-11-25 PROCEDURE — 81001 URINALYSIS AUTO W/SCOPE: CPT

## 2021-11-25 PROCEDURE — 96367 TX/PROPH/DG ADDL SEQ IV INF: CPT

## 2021-11-25 PROCEDURE — 80053 COMPREHEN METABOLIC PANEL: CPT | Performed by: EMERGENCY MEDICINE

## 2021-11-25 PROCEDURE — 36415 COLL VENOUS BLD VENIPUNCTURE: CPT | Performed by: EMERGENCY MEDICINE

## 2021-11-25 PROCEDURE — 99284 EMERGENCY DEPT VISIT MOD MDM: CPT

## 2021-11-25 PROCEDURE — 84145 PROCALCITONIN (PCT): CPT | Performed by: EMERGENCY MEDICINE

## 2021-11-25 PROCEDURE — 96376 TX/PRO/DX INJ SAME DRUG ADON: CPT

## 2021-11-25 PROCEDURE — 74177 CT ABD & PELVIS W/CONTRAST: CPT

## 2021-11-25 PROCEDURE — 99285 EMERGENCY DEPT VISIT HI MDM: CPT | Performed by: EMERGENCY MEDICINE

## 2021-11-25 PROCEDURE — 96365 THER/PROPH/DIAG IV INF INIT: CPT

## 2021-11-25 PROCEDURE — 71045 X-RAY EXAM CHEST 1 VIEW: CPT

## 2021-11-25 PROCEDURE — 96361 HYDRATE IV INFUSION ADD-ON: CPT

## 2021-11-25 PROCEDURE — 81025 URINE PREGNANCY TEST: CPT | Performed by: EMERGENCY MEDICINE

## 2021-11-25 PROCEDURE — 84484 ASSAY OF TROPONIN QUANT: CPT | Performed by: EMERGENCY MEDICINE

## 2021-11-25 PROCEDURE — 96372 THER/PROPH/DIAG INJ SC/IM: CPT

## 2021-11-25 PROCEDURE — G1004 CDSM NDSC: HCPCS

## 2021-11-25 PROCEDURE — 85025 COMPLETE CBC W/AUTO DIFF WBC: CPT | Performed by: EMERGENCY MEDICINE

## 2021-11-25 PROCEDURE — 85379 FIBRIN DEGRADATION QUANT: CPT | Performed by: EMERGENCY MEDICINE

## 2021-11-25 PROCEDURE — 96375 TX/PRO/DX INJ NEW DRUG ADDON: CPT

## 2021-11-25 PROCEDURE — 93005 ELECTROCARDIOGRAM TRACING: CPT

## 2021-11-25 PROCEDURE — 71275 CT ANGIOGRAPHY CHEST: CPT

## 2021-11-25 PROCEDURE — 0241U HB NFCT DS VIR RESP RNA 4 TRGT: CPT | Performed by: EMERGENCY MEDICINE

## 2021-11-25 RX ORDER — AZITHROMYCIN 250 MG/1
TABLET, FILM COATED ORAL
Qty: 6 TABLET | Refills: 0 | Status: SHIPPED | OUTPATIENT
Start: 2021-11-25 | End: 2021-11-29

## 2021-11-25 RX ORDER — ONDANSETRON 2 MG/ML
4 INJECTION INTRAMUSCULAR; INTRAVENOUS ONCE
Status: COMPLETED | OUTPATIENT
Start: 2021-11-25 | End: 2021-11-25

## 2021-11-25 RX ORDER — CYANOCOBALAMIN 1000 UG/ML
1000 INJECTION INTRAMUSCULAR; SUBCUTANEOUS ONCE
Status: COMPLETED | OUTPATIENT
Start: 2021-11-25 | End: 2021-11-25

## 2021-11-25 RX ORDER — ACETAMINOPHEN 325 MG/1
975 TABLET ORAL ONCE
Status: COMPLETED | OUTPATIENT
Start: 2021-11-25 | End: 2021-11-25

## 2021-11-25 RX ADMIN — CYANOCOBALAMIN 1000 MCG: 1000 INJECTION, SOLUTION INTRAMUSCULAR; SUBCUTANEOUS at 12:32

## 2021-11-25 RX ADMIN — ONDANSETRON 4 MG: 2 INJECTION INTRAMUSCULAR; INTRAVENOUS at 09:54

## 2021-11-25 RX ADMIN — FOLIC ACID 1 MG: 5 INJECTION, SOLUTION INTRAMUSCULAR; INTRAVENOUS; SUBCUTANEOUS at 13:03

## 2021-11-25 RX ADMIN — IOHEXOL 100 ML: 350 INJECTION, SOLUTION INTRAVENOUS at 11:11

## 2021-11-25 RX ADMIN — SODIUM CHLORIDE 1000 ML: 0.9 INJECTION, SOLUTION INTRAVENOUS at 08:30

## 2021-11-25 RX ADMIN — SODIUM CHLORIDE 1000 ML: 0.9 INJECTION, SOLUTION INTRAVENOUS at 13:01

## 2021-11-25 RX ADMIN — THIAMINE HYDROCHLORIDE 100 MG: 100 INJECTION, SOLUTION INTRAMUSCULAR; INTRAVENOUS at 12:30

## 2021-11-25 RX ADMIN — ONDANSETRON 4 MG: 2 INJECTION INTRAMUSCULAR; INTRAVENOUS at 13:01

## 2021-11-25 RX ADMIN — ACETAMINOPHEN 975 MG: 325 TABLET, FILM COATED ORAL at 08:21

## 2021-11-26 LAB
ATRIAL RATE: 76 BPM
P AXIS: 51 DEGREES
PR INTERVAL: 156 MS
QRS AXIS: 62 DEGREES
QRSD INTERVAL: 86 MS
QT INTERVAL: 386 MS
QTC INTERVAL: 434 MS
T WAVE AXIS: 26 DEGREES
VENTRICULAR RATE: 76 BPM

## 2021-11-26 PROCEDURE — 93010 ELECTROCARDIOGRAM REPORT: CPT | Performed by: INTERNAL MEDICINE

## 2021-12-02 ENCOUNTER — OFFICE VISIT (OUTPATIENT)
Dept: BARIATRICS | Facility: CLINIC | Age: 26
End: 2021-12-02

## 2021-12-02 VITALS
HEIGHT: 65 IN | TEMPERATURE: 98.4 F | DIASTOLIC BLOOD PRESSURE: 84 MMHG | SYSTOLIC BLOOD PRESSURE: 122 MMHG | HEART RATE: 74 BPM | WEIGHT: 260 LBS | BODY MASS INDEX: 43.32 KG/M2

## 2021-12-02 DIAGNOSIS — Z98.84 BARIATRIC SURGERY STATUS: Primary | ICD-10-CM

## 2021-12-02 DIAGNOSIS — E66.01 OBESITY, CLASS III, BMI 40-49.9 (MORBID OBESITY) (HCC): ICD-10-CM

## 2021-12-02 PROCEDURE — RECHECK: Performed by: DIETITIAN, REGISTERED

## 2021-12-02 PROCEDURE — 99024 POSTOP FOLLOW-UP VISIT: CPT | Performed by: SURGERY

## 2021-12-02 RX ORDER — ALBUTEROL SULFATE 90 UG/1
AEROSOL, METERED RESPIRATORY (INHALATION)
COMMUNITY
Start: 2021-11-30 | End: 2022-03-28 | Stop reason: ALTCHOICE

## 2021-12-20 ENCOUNTER — TELEPHONE (OUTPATIENT)
Dept: BARIATRICS | Facility: CLINIC | Age: 26
End: 2021-12-20

## 2021-12-30 ENCOUNTER — OFFICE VISIT (OUTPATIENT)
Dept: BARIATRICS | Facility: CLINIC | Age: 26
End: 2021-12-30

## 2021-12-30 VITALS — HEIGHT: 65 IN | WEIGHT: 244 LBS | BODY MASS INDEX: 40.65 KG/M2

## 2021-12-30 DIAGNOSIS — E66.01 OBESITY, CLASS III, BMI 40-49.9 (MORBID OBESITY) (HCC): Primary | ICD-10-CM

## 2021-12-30 PROCEDURE — RECHECK

## 2022-01-03 PROCEDURE — 87636 SARSCOV2 & INF A&B AMP PRB: CPT | Performed by: INTERNAL MEDICINE

## 2022-01-11 PROCEDURE — U0005 INFEC AGEN DETEC AMPLI PROBE: HCPCS | Performed by: INTERNAL MEDICINE

## 2022-01-11 PROCEDURE — U0003 INFECTIOUS AGENT DETECTION BY NUCLEIC ACID (DNA OR RNA); SEVERE ACUTE RESPIRATORY SYNDROME CORONAVIRUS 2 (SARS-COV-2) (CORONAVIRUS DISEASE [COVID-19]), AMPLIFIED PROBE TECHNIQUE, MAKING USE OF HIGH THROUGHPUT TECHNOLOGIES AS DESCRIBED BY CMS-2020-01-R: HCPCS | Performed by: INTERNAL MEDICINE

## 2022-02-17 PROCEDURE — U0003 INFECTIOUS AGENT DETECTION BY NUCLEIC ACID (DNA OR RNA); SEVERE ACUTE RESPIRATORY SYNDROME CORONAVIRUS 2 (SARS-COV-2) (CORONAVIRUS DISEASE [COVID-19]), AMPLIFIED PROBE TECHNIQUE, MAKING USE OF HIGH THROUGHPUT TECHNOLOGIES AS DESCRIBED BY CMS-2020-01-R: HCPCS | Performed by: INTERNAL MEDICINE

## 2022-02-17 PROCEDURE — U0005 INFEC AGEN DETEC AMPLI PROBE: HCPCS | Performed by: INTERNAL MEDICINE

## 2022-02-18 ENCOUNTER — TELEPHONE (OUTPATIENT)
Dept: BARIATRICS | Facility: CLINIC | Age: 27
End: 2022-02-18

## 2022-02-18 NOTE — TELEPHONE ENCOUNTER
Returned phone call  Patient would like to buy over the counter vitamins until she can buy bariatric vitamins and minerals  Currently taking marianne fusion and a hair health vitamin  Recommended the following temporarily     Take Marianne fusion twice per day  1 dose = 2 chewable,  take 2 chewable in am and 2 chewable in pm   ( 4 per day )   Sugar bear hair one dose = 2 chewable,   take 2 chewable once per day  ( 2 per day )     Iron 45 mg with vitamin C ( 1 per day )   B12 500 to 1000 mcg  ( 1 per day )  Calcium 600 mg twice per day  ( 2 per day ) - chewable     Once able switch to one a day bariatric vitamin and mineral  Recommend bariatric pal one per day capsule for 12 dollars per month       Pt appreciative of the call

## 2022-02-18 NOTE — TELEPHONE ENCOUNTER
Patient called the office asking to speak to Rakesh Eugene RD  Informed pt that Rakesh Mandujano is at the [facility] location and pt stated that she tried calling but had no answer  She asked if I can put her through any dietician because she had a question about vitamins  Patient hung up after leaving her on a brief hold while I attempted to contact 2 of our dieticians to answer her questions

## 2022-03-03 ENCOUNTER — OFFICE VISIT (OUTPATIENT)
Dept: BARIATRICS | Facility: CLINIC | Age: 27
End: 2022-03-03
Payer: COMMERCIAL

## 2022-03-03 ENCOUNTER — OFFICE VISIT (OUTPATIENT)
Dept: BARIATRICS | Facility: CLINIC | Age: 27
End: 2022-03-03

## 2022-03-03 VITALS
HEART RATE: 74 BPM | RESPIRATION RATE: 16 BRPM | SYSTOLIC BLOOD PRESSURE: 124 MMHG | DIASTOLIC BLOOD PRESSURE: 84 MMHG | BODY MASS INDEX: 38.02 KG/M2 | TEMPERATURE: 97 F | HEIGHT: 65 IN | WEIGHT: 228.2 LBS

## 2022-03-03 DIAGNOSIS — Z48.815 ENCOUNTER FOR SURGICAL AFTERCARE FOLLOWING SURGERY OF DIGESTIVE SYSTEM: Primary | ICD-10-CM

## 2022-03-03 DIAGNOSIS — E66.9 OBESITY, CLASS II, BMI 35-39.9: Primary | ICD-10-CM

## 2022-03-03 DIAGNOSIS — Z98.84 BARIATRIC SURGERY STATUS: ICD-10-CM

## 2022-03-03 DIAGNOSIS — K91.2 POSTSURGICAL MALABSORPTION: ICD-10-CM

## 2022-03-03 DIAGNOSIS — E66.9 OBESITY, CLASS II, BMI 35-39.9: ICD-10-CM

## 2022-03-03 PROCEDURE — RECHECK

## 2022-03-03 PROCEDURE — 99213 OFFICE O/P EST LOW 20 MIN: CPT | Performed by: PHYSICIAN ASSISTANT

## 2022-03-03 RX ORDER — NORETHINDRONE ACETATE AND ETHINYL ESTRADIOL AND FERROUS FUMARATE 1MG-20(21)
KIT ORAL
COMMUNITY
Start: 2022-01-08

## 2022-03-03 NOTE — PATIENT INSTRUCTIONS
· Follow-up in 3 months  We kindly ask that your arrive 15 minutes before your scheduled appointment time with your provider to allow our staff to room you, get your vital signs and update your chart  · Get lab work done prior to annual visit  Please call the office if you need a script  It is recommended to check with your insurance BEFORE getting labs done to make sure they are covered by your policy  · Call our office if you have any problems with abdominal pain especially associated with fever, chills, nausea, vomiting or any other concerns  · All  Post-bariatric surgery patients should be aware that very small quantities of any alcohol can cause impairment and it is very possible not to feel the effect  The effect can be in the system for several hours  It is also a stomach irritant  · It is advised to AVOID alcohol, Nonsteroidal antiinflammatory drugs (NSAIDS) and nicotine of all forms   Any of these can cause stomach irritation/pain  · Discussed the effects of alcohol on a bariatric patient and the increased impairment risk  · Keep up the good work!

## 2022-03-03 NOTE — PROGRESS NOTES
Assessment/Plan:     Patient ID: Wendy Wick is a 27 y o  female  Bariatric Surgery Status    -s/p Vertical Sleeve Gastrectomy with Dr Henri Arriaza on 11/22/2021  Presents to the office today for 2nd postop  Overall well but c/o hair loss  We discussed that this is common after weight loss surgery and discussed importance of protin intake as well as consistency with her vitamins  · Continued/Maintain healthy weight loss with good nutrition intakes  · Adequate hydration with at least 64oz  fluid intake  · Follow diet as discussed  · Follow vitamin and mineral recommendations as reviewed with you  · Exercise as tolerated  · Colonoscopy referral made: not in age range     · Follow-up in 3 months  We kindly ask that your arrive 15 minutes before your scheduled appointment time with your provider to allow our staff to room you, get your vital signs and update your chart  · Get lab work done prior to annual visit  Please call the office if you need a script  It is recommended to check with your insurance BEFORE getting labs done to make sure they are covered by your policy  · Call our office if you have any problems with abdominal pain especially associated with fever, chills, nausea, vomiting or any other concerns  · All  Post-bariatric surgery patients should be aware that very small quantities of any alcohol can cause impairment and it is very possible not to feel the effect  The effect can be in the system for several hours  It is also a stomach irritant  · It is advised to AVOID alcohol, Nonsteroidal antiinflammatory drugs (NSAIDS) and nicotine of all forms   Any of these can cause stomach irritation/pain  · Discussed the effects of alcohol on a bariatric patient and the increased impairment risk  · Keep up the good work!      Postsurgical Malabsorption   -At risk for malabsorption of vitamins/minerals secondary to malabsorption and restriction of intake from bariatric surgery  -Currently taking adequate postop bariatric surgery vitamin supplementation  -Next set of bariatric labs ordered for approximately 2 weeks  -Patient received education about the importance of adhering to a lifelong supplementation regimen to avoid vitamin/mineral deficiencies      Diagnoses and all orders for this visit:    Encounter for surgical aftercare following surgery of digestive system  -     Zinc; Future  -     Vitamin D 25 hydroxy; Future  -     Vitamin B12; Future  -     Vitamin B1, whole blood; Future  -     Vitamin A; Future  -     PTH, intact; Future  -     CBC and Platelet; Future  -     Comprehensive metabolic panel; Future  -     Ferritin; Future  -     Folate; Future  -     Iron Saturation %; Future    Bariatric surgery status  -     Zinc; Future  -     Vitamin D 25 hydroxy; Future  -     Vitamin B12; Future  -     Vitamin B1, whole blood; Future  -     Vitamin A; Future  -     PTH, intact; Future  -     CBC and Platelet; Future  -     Comprehensive metabolic panel; Future  -     Ferritin; Future  -     Folate; Future  -     Iron Saturation %; Future    Postsurgical malabsorption  -     Zinc; Future  -     Vitamin D 25 hydroxy; Future  -     Vitamin B12; Future  -     Vitamin B1, whole blood; Future  -     Vitamin A; Future  -     PTH, intact; Future  -     CBC and Platelet; Future  -     Comprehensive metabolic panel; Future  -     Ferritin; Future  -     Folate; Future  -     Iron Saturation %; Future    Obesity, Class II, BMI 35-39 9  -     Zinc; Future  -     Vitamin D 25 hydroxy; Future  -     Vitamin B12; Future  -     Vitamin B1, whole blood; Future  -     Vitamin A; Future  -     PTH, intact; Future  -     CBC and Platelet; Future  -     Comprehensive metabolic panel; Future  -     Ferritin; Future  -     Folate; Future  -     Iron Saturation %; Future    Other orders  -     Junel FE 1/20 1-20 MG-MCG per tablet  -     Multiple Vitamin (MULTI-VITAMINS PO);  Take by mouth Subjective:      Patient ID: Vinicio Aguirre is a 27 y o  female  -s/p Vertical Sleeve Gastrectomy with Dr Eric Quick on 11/22/2021  Presents to the office today for 2nd postop  Initial:292 4  Current: 228  EWL: (Weight loss is ahead of schedule at this post surgical period )  Dixon: current   Current BMI is Body mass index is 37 97 kg/m²  · Tolerating a regular diet-yes  · Eating at least 60 grams of protein per day-likely not   · Following 30/60 minute rule with liquids-yes  · Drinking at least 64 ounces of fluid per day-yes  · Drinking carbonated beverages-no  · Sufficient exercise-walking   · Using NSAIDs regularly-no  · Using nicotine-no  · Using alcohol-no  · Supplements: gummy vitamins - advised she stop these and start either bar mvi capsulrs or chews or reg mvi but take 2      The following portions of the patient's history were reviewed and updated as appropriate: allergies, current medications, past family history, past medical history, past social history, past surgical history and problem list     Review of Systems   Constitutional: Negative  Respiratory: Negative  Cardiovascular: Negative  Gastrointestinal: Negative  Musculoskeletal: Negative  Skin: Negative  Neurological: Negative  Psychiatric/Behavioral: Negative  Objective:    /84 (BP Location: Left arm, Patient Position: Sitting, Cuff Size: Large)   Pulse 74   Temp (!) 97 °F (36 1 °C) (Tympanic)   Resp 16   Ht 5' 5" (1 651 m)   Wt 104 kg (228 lb 3 2 oz)   BMI 37 97 kg/m²      Physical Exam  Vitals and nursing note reviewed  Constitutional:       Appearance: Normal appearance  She is obese  HENT:      Head: Normocephalic and atraumatic  Eyes:      Extraocular Movements: Extraocular movements intact  Pupils: Pupils are equal, round, and reactive to light  Cardiovascular:      Rate and Rhythm: Normal rate     Pulmonary:      Effort: Pulmonary effort is normal    Musculoskeletal: General: Normal range of motion  Cervical back: Normal range of motion  Skin:     General: Skin is warm and dry  Neurological:      General: No focal deficit present  Mental Status: She is alert and oriented to person, place, and time     Psychiatric:         Mood and Affect: Mood normal

## 2022-03-03 NOTE — PROGRESS NOTES
Pt here with concerns of excessive hair loss  Pt not taking appropriate vitamins not eating enough protein and is under a lot of stress  Discussed increasing protein, vitamin and mineral supplementation, and psychological counseling

## 2022-03-17 ENCOUNTER — TELEPHONE (OUTPATIENT)
Dept: OBGYN CLINIC | Facility: CLINIC | Age: 27
End: 2022-03-17

## 2022-03-17 NOTE — TELEPHONE ENCOUNTER
Patient has very heavy bleeding and is in pain  Feeling pain since 3/15   Patient has concerns- thought it was her period but says the flow is abnormal  Last seen 12/7/20 by Dr Dane Lindsey

## 2022-03-17 NOTE — TELEPHONE ENCOUNTER
Pt said she hasnt had her period since Dec, started taking the pill lasts mnth, I asked her to take a hpt and cb with those results, advised if her pain is 10 out of 10 or 9-10 to go to ED

## 2022-03-19 ENCOUNTER — HOSPITAL ENCOUNTER (EMERGENCY)
Facility: HOSPITAL | Age: 27
Discharge: HOME | End: 2022-03-19
Attending: EMERGENCY MEDICINE | Admitting: EMERGENCY MEDICINE
Payer: COMMERCIAL

## 2022-03-19 VITALS
OXYGEN SATURATION: 100 % | SYSTOLIC BLOOD PRESSURE: 136 MMHG | TEMPERATURE: 97.8 F | RESPIRATION RATE: 20 BRPM | DIASTOLIC BLOOD PRESSURE: 80 MMHG | HEART RATE: 88 BPM

## 2022-03-19 DIAGNOSIS — H60.91 RIGHT OTITIS EXTERNA: Primary | ICD-10-CM

## 2022-03-19 PROCEDURE — 99284 EMERGENCY DEPT VISIT MOD MDM: CPT | Performed by: EMERGENCY MEDICINE

## 2022-03-19 PROCEDURE — 99282 EMERGENCY DEPT VISIT SF MDM: CPT

## 2022-03-19 PROCEDURE — 96372 THER/PROPH/DIAG INJ SC/IM: CPT

## 2022-03-19 RX ORDER — CIPROFLOXACIN AND DEXAMETHASONE 3; 1 MG/ML; MG/ML
4 SUSPENSION/ DROPS AURICULAR (OTIC) ONCE
Status: COMPLETED | OUTPATIENT
Start: 2022-03-19 | End: 2022-03-19

## 2022-03-19 RX ORDER — KETOROLAC TROMETHAMINE 30 MG/ML
15 INJECTION, SOLUTION INTRAMUSCULAR; INTRAVENOUS ONCE
Status: COMPLETED | OUTPATIENT
Start: 2022-03-19 | End: 2022-03-19

## 2022-03-19 RX ORDER — OXYCODONE HYDROCHLORIDE 5 MG/1
5 TABLET ORAL ONCE
Status: COMPLETED | OUTPATIENT
Start: 2022-03-19 | End: 2022-03-19

## 2022-03-19 RX ORDER — OXYCODONE HYDROCHLORIDE 5 MG/1
5 TABLET ORAL EVERY 4 HOURS PRN
Qty: 8 TABLET | Refills: 0 | Status: SHIPPED | OUTPATIENT
Start: 2022-03-19 | End: 2022-03-21

## 2022-03-19 RX ADMIN — KETOROLAC TROMETHAMINE 15 MG: 30 INJECTION, SOLUTION INTRAMUSCULAR at 01:47

## 2022-03-19 RX ADMIN — CIPROFLOXACIN AND DEXAMETHASONE 4 DROP: 3; 1 SUSPENSION/ DROPS AURICULAR (OTIC) at 01:47

## 2022-03-19 RX ADMIN — OXYCODONE HYDROCHLORIDE 5 MG: 5 TABLET ORAL at 01:48

## 2022-03-19 NOTE — DISCHARGE INSTRUCTIONS
You have been seen for right otitis externa  Please continue the previously prescribed antibiotic drops and oral antibiotics as previously prescribed  Return to the emergency department if you develop worsening pain, fevers, neck swelling, trouble swallowing, neck stiffness or any other symptoms of concern  Please follow up with ENT by calling the number provided

## 2022-03-19 NOTE — ED PROVIDER NOTES
History  Chief Complaint   Patient presents with    Earache     patient c/o right sided earache that began 2 days ago and is progressively getting worse  Patient states she tried seeing her PCP and urgent care with no relief from what they prescribed     Seth Ayers is a 27y o  year old female presenting to the [facility] ED for right ear pain  Patient has had two days of progressively worsening right ear discomfort  Patient was seen and diagnosed with otitis externa and started on ciprodex drops and azithromycin  The patient has exquisite tenderness on the outside of her right ear and right side of her head  Patient denies fevers, bleeding/purulent drainage  No neck pain or swelling  No swelling of the tongue  She has decreased auditory sense in her right ear  Patient has taken tylenol at home for symptomatic treatment  No history of diabetes  She has not swam in a swimming pool of late  History provided by:  Patient   used: No    Earache  Associated symptoms: no abdominal pain, no congestion, no cough, no diarrhea, no ear discharge, no fever, no headaches, no neck pain, no rash and no vomiting        Prior to Admission Medications   Prescriptions Last Dose Informant Patient Reported? Taking?    Advair Diskus 250-50 MCG/DOSE inhaler   Yes No   Patient not taking: Reported on 3/3/2022    Junel FE 1/20 1-20 MG-MCG per tablet   Yes No   Multiple Vitamin (MULTI-VITAMINS PO)  Self Yes No   Sig: Take by mouth   albuterol (PROVENTIL HFA,VENTOLIN HFA) 90 mcg/act inhaler   Yes No   Patient not taking: Reported on 3/3/2022    enoxaparin (LOVENOX) 40 mg/0 4 mL   No No   Sig: Inject 0 4 mL (40 mg total) under the skin daily for 13 days   ergocalciferol (VITAMIN D2) 50,000 units   No No   Sig: Take 1 capsule (50,000 Units total) by mouth once a week   omeprazole (PriLOSEC) 20 mg delayed release capsule   No No   Sig: Take 1 capsule (20 mg total) by mouth daily   ondansetron (ZOFRAN-ODT) 4 mg disintegrating tablet   No No   Sig: Take 1 tablet (4 mg total) by mouth every 6 (six) hours as needed for nausea or vomiting for up to 10 doses   Patient not taking: Reported on 3/3/2022    oxyCODONE (Roxicodone) 5 immediate release tablet   No No   Sig: Take 1 tablet (5 mg total) by mouth every 4 (four) hours as needed for moderate painMax Daily Amount: 30 mg   Patient not taking: Reported on 2021       Facility-Administered Medications: None       Past Medical History:   Diagnosis Date    GERD (gastroesophageal reflux disease)     PCOS (polycystic ovarian syndrome)     Wears glasses        Past Surgical History:   Procedure Laterality Date    EGD      NO PAST SURGERIES      OH LAP, LINDA RESTRICT PROC, LONGITUDINAL GASTRECTOMY N/A 2021    Procedure: LAPAROSCOPIC SLEEVE GASTRECTOMY WITH ROBOTICS;  Surgeon: Ramez Ramirez MD;  Location: [facility];  Service: [facility] P[facility] THYROID BIOPSY  10/4/2021       Family History   Problem Relation Age of Onset    Thyroid disease Mother     Sarcoidosis Mother     Hypertension Father     Diabetes Father     Hyperlipidemia Father     Cancer Neg Hx     Stroke Neg Hx      I have reviewed and agree with the history as documented  E-Cigarette/Vaping    E-Cigarette Use Never User      E-Cigarette/Vaping Substances    Nicotine No     THC No     CBD No     Flavoring No     Other No     Unknown No      Social History     Tobacco Use    Smoking status: Former Smoker     Types: Cigarettes     Quit date: 2017     Years since quittin 2    Smokeless tobacco: Never Used    Tobacco comment: patient admits to hx of 1 or 2 cigarettes a year  Used hookah   Vaping Use    Vaping Use: Never used   Substance Use Topics    Alcohol use: Not Currently    Drug use: No        Review of Systems   Constitutional: Negative for chills and fever  HENT: Positive for ear pain   Negative for congestion, ear discharge, facial swelling, trouble swallowing and voice change  Eyes: Negative for visual disturbance  Respiratory: Negative for cough and shortness of breath  Cardiovascular: Negative for chest pain  Gastrointestinal: Negative for abdominal pain, diarrhea, nausea and vomiting  Genitourinary: Negative for flank pain  Musculoskeletal: Negative for neck pain and neck stiffness  Skin: Negative for color change and rash  Neurological: Negative for syncope, light-headedness and headaches  Psychiatric/Behavioral: Negative for behavioral problems and confusion  All other systems reviewed and are negative  Physical Exam  ED Triage Vitals [03/19/22 0101]   Temperature Pulse Respirations Blood Pressure SpO2   97 8 °F (36 6 °C) 88 20 136/80 100 %      Temp Source Heart Rate Source Patient Position - Orthostatic VS BP Location FiO2 (%)   Tympanic Monitor -- -- --      Pain Score       10 - Worst Possible Pain             Orthostatic Vital Signs  Vitals:    03/19/22 0101   BP: 136/80   Pulse: 88       Physical Exam  Vitals and nursing note reviewed  Constitutional:       General: She is not in acute distress  Appearance: Normal appearance  She is well-developed  She is not ill-appearing, toxic-appearing or diaphoretic  HENT:      Head: Normocephalic and atraumatic  Comments: No mastoid tenderness  Right Ear: Swelling and tenderness present  No mastoid tenderness  Left Ear: No mastoid tenderness  Ears:      Comments: Global tenderness and edema/erythema of the right external ear  Exquisite tenderness with slight motion of the right external ear  No bleeding or purulent drainage  Nose: No congestion or rhinorrhea  Eyes:      General:         Right eye: No discharge  Left eye: No discharge  Cardiovascular:      Rate and Rhythm: Normal rate and regular rhythm  Pulmonary:      Effort: Pulmonary effort is normal  No accessory muscle usage or respiratory distress  Breath sounds: Normal breath sounds   No stridor  No decreased breath sounds, wheezing, rhonchi or rales  Abdominal:      General: Bowel sounds are normal  There is no distension  Palpations: Abdomen is soft  Tenderness: There is no abdominal tenderness  There is no guarding or rebound  Musculoskeletal:      Cervical back: Normal range of motion and neck supple  No edema, erythema, rigidity or crepitus  Right lower leg: No tenderness  No edema  Left lower leg: No tenderness  No edema  Skin:     Capillary Refill: Capillary refill takes less than 2 seconds  Findings: No rash  Neurological:      Mental Status: She is alert and oriented to person, place, and time  Psychiatric:         Mood and Affect: Mood normal          Behavior: Behavior normal          ED Medications  Medications   oxyCODONE (ROXICODONE) IR tablet 5 mg (has no administration in time range)   ciprofloxacin-dexamethasone (CIPRODEX) 0 3-0 1 % otic suspension 4 drop (has no administration in time range)   ketorolac (TORADOL) injection 15 mg (has no administration in time range)       Diagnostic Studies  Results Reviewed     None                 No orders to display         Procedures  Procedures      ED Course                                       MDM  Number of Diagnoses or Management Options  Right otitis externa  Diagnosis management comments:   27 y o  female presenting for right ear pain  Afebrile, exam consistent with otitis externa  Will treat symptomatically and place right ear wick  I have discussed with the patient our plan to discharge them from the ED and the patient is in agreement with this plan  The patient was provided a written after visit summary with strict RTED precautions  Discharge Plan: continue oral and topical antibiotics, Rx for analgesia, ENT f/u  Followup: I have discussed with the patient plan to follow up with ENT   Contact information provided in AVS        Amount and/or Complexity of Data Reviewed  Review and summarize past medical records: yes    Patient Progress  Patient progress: stable      Disposition  Final diagnoses:   Right otitis externa     Time reflects when diagnosis was documented in both MDM as applicable and the Disposition within this note     Time User Action Codes Description Comment    3/19/2022  1:31 AM Julieth Florez Johnny [H60 [id number]] Right otitis externa       ED Disposition     ED Disposition Condition Date/Time Comment    Discharge Stable Sat Mar 19, 2022  1:31 AM Katherinebritney Khannaflaca Posada discharge to home/self care  Follow-up Information     Follow up With Specialties Details Why Carlos Keyspiter [address] Otolaryngology Schedule an appointment as soon as possible for a visit  To make appointment for reevaluation in 3-5 days  [address] [facility] [id number]  [facility], [address] [place] [address] Dr Lawson, [facility] [address], David Moncada [address]          Patient's Medications   Discharge Prescriptions    OXYCODONE (ROXICODONE) 5 IMMEDIATE RELEASE TABLET    Take 1 tablet (5 mg total) by mouth every 4 (four) hours as needed for moderate pain Max Daily Amount: 30 mg       Start Date: 3/19/2022 End Date: --       Order Dose: 5 mg       Quantity: 8 tablet    Refills: 0         PDMP Review       Value Time User    PDMP Reviewed  Yes 11/22/2021  1:04 PM Vaibhav Kohli PA-C           ED Provider  Attending physically available and evaluated Lucinda Rodriguez I managed the patient along with the ED Attending      Electronically Signed by         Derek Ty DO  03/19/22 4771

## 2022-03-19 NOTE — ED ATTENDING ATTESTATION
3/19/2022  IAshley MD, saw and evaluated the patient  I have discussed the patient with the resident/non-physician practitioner and agree with the resident's/non-physician practitioner's findings, Plan of Care, and MDM as documented in the resident's/non-physician practitioner's note, except where noted  All available labs and Radiology studies were reviewed  I was present for key portions of any procedure(s) performed by the resident/non-physician practitioner and I was immediately available to provide assistance  At this point I agree with the current assessment done in the Emergency Department  I have conducted an independent evaluation of this patient a history and physical is as follows:    ED Course         Critical Care Time  Procedures    28 yo female with two days of worsening pain and drainage and reduced hearing in right ear  No swimming, no diabetes  Pt seen by pcp and given ciprodex and azithromycin  Pt taking tylenol  psh gastric sleeve  Vss, afebrile, lungs cta, rrr, right ear tenderness, pinna tenderness, otitis externa  Pain meds  Ear wick  Airway  Performed by: Srinivasan Lake CRNA  Authorized by: Leandro Vela MD     Final Airway Type:  Supraglottic airway  Final Supraglottic Airway:  Unique  SGA Size*:  4  Attempts*:  1  Ventilation Between Attempts:  None  Number of Other Approaches Attempted:  0   Patient Identified, Procedure confirmed, Emergency equipment available and Safety protocols followed  Location:  OR  Urgency:  Elective  Difficult Airway: No    Indications for Airway Management:  Anesthesia  Spontaneous Ventilation: absent    Sedation Level:  Deep  Mask Difficulty Assessment:  1 - vent by mask  Performed By:  Anesthesiologist and CRNA  Anesthesiologist:  Leandro Vela MD  CRNA:  Srinivasan Lake CRNA

## 2022-03-28 ENCOUNTER — OFFICE VISIT (OUTPATIENT)
Dept: OBGYN CLINIC | Facility: MEDICAL CENTER | Age: 27
End: 2022-03-28
Payer: COMMERCIAL

## 2022-03-28 ENCOUNTER — TELEPHONE (OUTPATIENT)
Dept: OBGYN CLINIC | Facility: CLINIC | Age: 27
End: 2022-03-28

## 2022-03-28 VITALS — WEIGHT: 222 LBS | DIASTOLIC BLOOD PRESSURE: 80 MMHG | SYSTOLIC BLOOD PRESSURE: 132 MMHG | BODY MASS INDEX: 36.94 KG/M2

## 2022-03-28 DIAGNOSIS — N92.1 MENORRHAGIA WITH IRREGULAR CYCLE: ICD-10-CM

## 2022-03-28 PROBLEM — Z01.810 PREOP CARDIOVASCULAR EXAM: Status: RESOLVED | Noted: 2021-02-23 | Resolved: 2022-03-28

## 2022-03-28 PROBLEM — E78.2 MIXED HYPERLIPIDEMIA: Status: ACTIVE | Noted: 2017-05-01

## 2022-03-28 PROCEDURE — 99214 OFFICE O/P EST MOD 30 MIN: CPT | Performed by: PHYSICIAN ASSISTANT

## 2022-03-28 RX ORDER — TRANEXAMIC ACID 650 1/1
TABLET ORAL
Qty: 30 TABLET | Refills: 3 | Status: SHIPPED | OUTPATIENT
Start: 2022-03-28

## 2022-03-28 RX ORDER — NORETHINDRONE ACETATE AND ETHINYL ESTRADIOL 1MG-20(21)
1 KIT ORAL DAILY
Qty: 90 TABLET | Refills: 1 | Status: SHIPPED | OUTPATIENT
Start: 2022-03-28

## 2022-03-28 NOTE — PROGRESS NOTES
Marcos Galindo  (Kah-LOOD)  1995    S:  32 y o  female here for a problem visit  She complains of heavy vaginal bleeding for the past 14 days     Her last bleed was  and then had nothing until 3/15 until present - she reports heavy bleeding for the past 14 days, sometimes saturating a pad in an hour  She restarted Junel Fe 1/20 16 days ago (just prior to her bleeding) but forgot the last 3 days  CBC 21 showed a hemoglobin of 11 8    She did have COVID in January  She has a known history of PCOS, hypertension, and obesity  She underwent robotic sleeve gastrectomy on 21  Preop weight was 270 lbs  and today she is 222 lbs , a 48# weight loss  She is not on medication for hypertension and her blood pressures are normal      She did take a negative home pregnancy test on 3/14  Past Medical History:   Diagnosis Date    GERD (gastroesophageal reflux disease)     HL (hearing loss)     PCOS (polycystic ovarian syndrome)     Tinnitus     Wears glasses      Family History   Problem Relation Age of Onset    Thyroid disease Mother    Fermin Abt Sarcoidosis Mother     Hypertension Father     Diabetes Father     Hyperlipidemia Father     Cancer Neg Hx     Stroke Neg Hx      Social History     Socioeconomic History    Marital status: Single     Spouse name: None    Number of children: None    Years of education: None    Highest education level: None   Occupational History    None   Tobacco Use    Smoking status: Former Smoker     Types: Cigarettes     Quit date: 2017     Years since quittin 2    Smokeless tobacco: Never Used    Tobacco comment: patient admits to hx of 1 or 2 cigarettes a year   Used hookah   Vaping Use    Vaping Use: Never used   Substance and Sexual Activity    Alcohol use: Not Currently    Drug use: No    Sexual activity: Yes     Partners: Male     Birth control/protection: OCP   Other Topics Concern    None   Social History Narrative    None Social Determinants of Health     Financial Resource Strain: Not on file   Food Insecurity: Not on file   Transportation Needs: Not on file   Physical Activity: Not on file   Stress: Not on file   Social Connections: Not on file   Intimate Partner Violence: Not on file   Housing Stability: Not on file       Review of Systems   Respiratory: Negative  Cardiovascular: Negative  Gastrointestinal: Negative for constipation and diarrhea  Genitourinary: Negative for difficulty urinating, pelvic pain, vaginal bleeding, vaginal discharge, itching or odor  O:  /80 (BP Location: Right arm, Patient Position: Sitting, Cuff Size: Standard)   Wt 101 kg (222 lb)   LMP 03/15/2022 (Exact Date)   BMI 36 94 kg/m²   She appears well and is in no distress  Abdomen is soft and nontender  External genitals are normal without lesions or rashes  Vagina has moderate dark red bleeding present  Cervix is normal, no lesions or discharge  Uterus is nontender, no masses  Adnexa are nontender, no pelvic masses appreciated    Urine pregnancy test is negative     A/P: Menorrhagia with irregular cycle   PCOS     Restart Junel Fe 1/20 now  Discussed ways to remember her pills  Lysteda two po TID x 5 days PRN for heavy menstrual bleeding     Check CBC, TSH, pelvic ultrasound in the interim     RTO 3 months for yearly exam and recheck

## 2022-03-28 NOTE — TELEPHONE ENCOUNTER
Pt had called recently about abnormal bleeding; she has been bleeding for 14 days; changing a pad every hour  Would like to get in w/Dr Aden Vieira but his schedule is full this week

## 2022-03-28 NOTE — TELEPHONE ENCOUNTER
I called and spoke with pt  She informed me she has had cycle for 14 days  Very heavy bleeding  hasnt been seen since 12/2020 with dr perrin  she has been bleeding for her cycle for 14 days  she said she is changing a pad every hour  no fatigue but has pain/cramping  on junel, but reports not taking it consistently  I told her I would reach out to on call and see what they say but they may recc her going to Er  Per TT CT:    If she is soaking her pad every hour then ER at [facility], otherwise OV asap       I called pt back and informed  Said she isnt completely soaking through yet  No appts in [place]  Was willing to travel as she did not want to go to ER  Scheduled with [facility] at 3:40 in   I told pt in the meantime if she does soak through completely she needs to go to ER  Pt even said she would wait until tomorrow to see RB if he was available, I told pt that CT advised asap so I wouldn't recc waiting on this  Pt agreed  CT aware of situation and pt verbalized understanding to all  negative No oral lesions; no gross abnormalities

## 2022-03-30 ENCOUNTER — APPOINTMENT (OUTPATIENT)
Dept: LAB | Facility: CLINIC | Age: 27
End: 2022-03-30
Payer: COMMERCIAL

## 2022-03-30 DIAGNOSIS — E66.9 OBESITY, CLASS II, BMI 35-39.9: ICD-10-CM

## 2022-03-30 DIAGNOSIS — K91.2 POSTSURGICAL MALABSORPTION: ICD-10-CM

## 2022-03-30 DIAGNOSIS — Z01.818 PREOP TESTING: ICD-10-CM

## 2022-03-30 DIAGNOSIS — Z98.84 BARIATRIC SURGERY STATUS: ICD-10-CM

## 2022-03-30 DIAGNOSIS — Z48.815 ENCOUNTER FOR SURGICAL AFTERCARE FOLLOWING SURGERY OF DIGESTIVE SYSTEM: ICD-10-CM

## 2022-03-30 DIAGNOSIS — N92.1 MENORRHAGIA WITH IRREGULAR CYCLE: ICD-10-CM

## 2022-03-30 DIAGNOSIS — E66.01 MORBID (SEVERE) OBESITY DUE TO EXCESS CALORIES (HCC): ICD-10-CM

## 2022-03-30 LAB
25(OH)D3 SERPL-MCNC: 25 NG/ML (ref 30–100)
ALBUMIN SERPL BCP-MCNC: 3.5 G/DL (ref 3.5–5)
ALP SERPL-CCNC: 73 U/L (ref 46–116)
ALT SERPL W P-5'-P-CCNC: 163 U/L (ref 12–78)
ANION GAP SERPL CALCULATED.3IONS-SCNC: 3 MMOL/L (ref 4–13)
AST SERPL W P-5'-P-CCNC: 29 U/L (ref 5–45)
BASOPHILS # BLD AUTO: 0.08 THOUSANDS/ΜL (ref 0–0.1)
BASOPHILS NFR BLD AUTO: 1 % (ref 0–1)
BILIRUB SERPL-MCNC: 0.35 MG/DL (ref 0.2–1)
BUN SERPL-MCNC: 13 MG/DL (ref 5–25)
CALCIUM SERPL-MCNC: 9.3 MG/DL (ref 8.3–10.1)
CHLORIDE SERPL-SCNC: 106 MMOL/L (ref 100–108)
CO2 SERPL-SCNC: 28 MMOL/L (ref 21–32)
CREAT SERPL-MCNC: 0.84 MG/DL (ref 0.6–1.3)
EOSINOPHIL # BLD AUTO: 0.27 THOUSAND/ΜL (ref 0–0.61)
EOSINOPHIL NFR BLD AUTO: 3 % (ref 0–6)
ERYTHROCYTE [DISTWIDTH] IN BLOOD BY AUTOMATED COUNT: 13.4 % (ref 11.6–15.1)
EST. AVERAGE GLUCOSE BLD GHB EST-MCNC: 97 MG/DL
FERRITIN SERPL-MCNC: 28 NG/ML (ref 8–388)
FOLATE SERPL-MCNC: >20 NG/ML (ref 3.1–17.5)
GFR SERPL CREATININE-BSD FRML MDRD: 94 ML/MIN/1.73SQ M
GLUCOSE SERPL-MCNC: 73 MG/DL (ref 65–140)
HBA1C MFR BLD: 5 %
HCT VFR BLD AUTO: 43.3 % (ref 34.8–46.1)
HGB BLD-MCNC: 13.6 G/DL (ref 11.5–15.4)
IMM GRANULOCYTES # BLD AUTO: 0.01 THOUSAND/UL (ref 0–0.2)
IMM GRANULOCYTES NFR BLD AUTO: 0 % (ref 0–2)
IRON SATN MFR SERPL: 9 % (ref 15–50)
IRON SERPL-MCNC: 32 UG/DL (ref 50–170)
LYMPHOCYTES # BLD AUTO: 2.4 THOUSANDS/ΜL (ref 0.6–4.47)
LYMPHOCYTES NFR BLD AUTO: 30 % (ref 14–44)
MCH RBC QN AUTO: 26.6 PG (ref 26.8–34.3)
MCHC RBC AUTO-ENTMCNC: 31.4 G/DL (ref 31.4–37.4)
MCV RBC AUTO: 85 FL (ref 82–98)
MONOCYTES # BLD AUTO: 0.56 THOUSAND/ΜL (ref 0.17–1.22)
MONOCYTES NFR BLD AUTO: 7 % (ref 4–12)
NEUTROPHILS # BLD AUTO: 4.67 THOUSANDS/ΜL (ref 1.85–7.62)
NEUTS SEG NFR BLD AUTO: 59 % (ref 43–75)
NRBC BLD AUTO-RTO: 0 /100 WBCS
PLATELET # BLD AUTO: 365 THOUSANDS/UL (ref 149–390)
PMV BLD AUTO: 11.3 FL (ref 8.9–12.7)
POTASSIUM SERPL-SCNC: 4 MMOL/L (ref 3.5–5.3)
PROT SERPL-MCNC: 8.3 G/DL (ref 6.4–8.2)
PTH-INTACT SERPL-MCNC: 37.8 PG/ML (ref 18.4–80.1)
RBC # BLD AUTO: 5.12 MILLION/UL (ref 3.81–5.12)
SODIUM SERPL-SCNC: 137 MMOL/L (ref 136–145)
TIBC SERPL-MCNC: 362 UG/DL (ref 250–450)
TSH SERPL DL<=0.05 MIU/L-ACNC: 0.97 UIU/ML (ref 0.36–3.74)
VIT B12 SERPL-MCNC: 410 PG/ML (ref 100–900)
WBC # BLD AUTO: 7.99 THOUSAND/UL (ref 4.31–10.16)

## 2022-03-30 PROCEDURE — 85025 COMPLETE CBC W/AUTO DIFF WBC: CPT

## 2022-03-30 PROCEDURE — 83550 IRON BINDING TEST: CPT

## 2022-03-30 PROCEDURE — 80053 COMPREHEN METABOLIC PANEL: CPT

## 2022-03-30 PROCEDURE — 83540 ASSAY OF IRON: CPT

## 2022-03-30 PROCEDURE — 82728 ASSAY OF FERRITIN: CPT

## 2022-03-30 PROCEDURE — 82306 VITAMIN D 25 HYDROXY: CPT

## 2022-03-30 PROCEDURE — 36415 COLL VENOUS BLD VENIPUNCTURE: CPT

## 2022-03-30 PROCEDURE — 83970 ASSAY OF PARATHORMONE: CPT

## 2022-03-30 PROCEDURE — 84630 ASSAY OF ZINC: CPT

## 2022-03-30 PROCEDURE — 82607 VITAMIN B-12: CPT

## 2022-03-30 PROCEDURE — 83036 HEMOGLOBIN GLYCOSYLATED A1C: CPT

## 2022-03-30 PROCEDURE — 84425 ASSAY OF VITAMIN B-1: CPT

## 2022-03-30 PROCEDURE — 82746 ASSAY OF FOLIC ACID SERUM: CPT

## 2022-03-30 PROCEDURE — 84590 ASSAY OF VITAMIN A: CPT

## 2022-03-30 PROCEDURE — 84443 ASSAY THYROID STIM HORMONE: CPT

## 2022-04-01 ENCOUNTER — TELEPHONE (OUTPATIENT)
Dept: BARIATRICS | Facility: CLINIC | Age: 27
End: 2022-04-01

## 2022-04-01 NOTE — TELEPHONE ENCOUNTER
Pt called with a question regarding the upcomming observance of ramadan  She was asking if it would be acceptable for her to fast from sun up to sunset  I spoke with joann kamara and she stated that from a medical standpoint it would be safer not to  But she also stated that if the patient feels very strongly about it she could nap during the day so she could stay up longer after sunset    I relayed this advise to the patient and she understood~cd

## 2022-04-03 LAB — ZINC SERPL-MCNC: 77 UG/DL (ref 44–115)

## 2022-04-06 ENCOUNTER — HOSPITAL ENCOUNTER (OUTPATIENT)
Dept: RADIOLOGY | Facility: HOSPITAL | Age: 27
Discharge: HOME | End: 2022-04-06
Payer: COMMERCIAL

## 2022-04-06 DIAGNOSIS — N92.1 MENORRHAGIA WITH IRREGULAR CYCLE: ICD-10-CM

## 2022-04-06 PROCEDURE — 76830 TRANSVAGINAL US NON-OB: CPT

## 2022-04-06 PROCEDURE — 76856 US EXAM PELVIC COMPLETE: CPT

## 2022-04-07 LAB — VIT A SERPL-MCNC: 27.9 UG/DL (ref 18.9–57.3)

## 2022-04-08 ENCOUNTER — TELEPHONE (OUTPATIENT)
Dept: OBGYN CLINIC | Facility: CLINIC | Age: 27
End: 2022-04-08

## 2022-04-08 NOTE — TELEPHONE ENCOUNTER
Spoke to pt and reviewed results and to add the extra iron daily  She said her bleeding is much better

## 2022-04-08 NOTE — TELEPHONE ENCOUNTER
----- Message from Velia Haynes PA-C sent at 4/8/2022  9:35 AM EDT -----  Please let Jonathan Servando know that her thyroid testing is normal  Her blood count shows mild iron deficiency; if she is not already taking it, I would like her to start an iron supplement once a day  Her pelvic ultrasound shows no concerning findings   Please check to see how she is doing bleeding-wise since I saw her last

## 2022-04-12 LAB — VIT B1 BLD-SCNC: 121.2 NMOL/L (ref 66.5–200)

## 2022-06-09 ENCOUNTER — OFFICE VISIT (OUTPATIENT)
Dept: BARIATRICS | Facility: CLINIC | Age: 27
End: 2022-06-09
Payer: COMMERCIAL

## 2022-06-09 VITALS
DIASTOLIC BLOOD PRESSURE: 82 MMHG | HEART RATE: 89 BPM | BODY MASS INDEX: 34.41 KG/M2 | WEIGHT: 206.5 LBS | TEMPERATURE: 98.2 F | SYSTOLIC BLOOD PRESSURE: 117 MMHG | HEIGHT: 65 IN

## 2022-06-09 DIAGNOSIS — Z48.815 ENCOUNTER FOR SURGICAL AFTERCARE FOLLOWING SURGERY OF DIGESTIVE SYSTEM: Primary | ICD-10-CM

## 2022-06-09 DIAGNOSIS — E66.9 OBESITY, CLASS I, BMI 30-34.9: ICD-10-CM

## 2022-06-09 DIAGNOSIS — Z98.84 BARIATRIC SURGERY STATUS: ICD-10-CM

## 2022-06-09 DIAGNOSIS — K91.2 POSTSURGICAL MALABSORPTION: ICD-10-CM

## 2022-06-09 DIAGNOSIS — E04.1 THYROID NODULE: ICD-10-CM

## 2022-06-09 PROCEDURE — 99214 OFFICE O/P EST MOD 30 MIN: CPT | Performed by: PHYSICIAN ASSISTANT

## 2022-06-09 RX ORDER — MONTELUKAST SODIUM 10 MG/1
10 TABLET ORAL DAILY
COMMUNITY
Start: 2022-05-17

## 2022-06-09 NOTE — PROGRESS NOTES
Assessment/Plan:     Patient ID: Kirill Castañeda is a 32 y o  female  Bariatric Surgery Status     Bariatric Surgery Status     -s/p Vertical Sleeve Gastrectomy with Dr Jazmín Savage on 11/22/2021  Presents to the office today for 3rd postop  Overall well    Thyroid nodule   - following with endocrinology, pending confirmed biopsy     · Continued/Maintain healthy weight loss with good nutrition intakes  · Adequate hydration with at least 64oz  fluid intake  · Follow diet as discussed  · Follow vitamin and mineral recommendations as reviewed with you  · Exercise as tolerated  · Colonoscopy referral made: not in age range    · Follow-up in 6 months  We kindly ask that your arrive 15 minutes before your scheduled appointment time with your provider to allow our staff to room you, get your vital signs and update your chart  · Get lab work done  Please call the office if you need a script  It is recommended to check with your insurance BEFORE getting labs done to make sure they are covered by your policy  · Call our office if you have any problems with abdominal pain especially associated with fever, chills, nausea, vomiting or any other concerns  · All  Post-bariatric surgery patients should be aware that very small quantities of any alcohol can cause impairment and it is very possible not to feel the effect  The effect can be in the system for several hours  It is also a stomach irritant  · It is advised to AVOID alcohol, Nonsteroidal antiinflammatory drugs (NSAIDS) and nicotine of all forms   Any of these can cause stomach irritation/pain  · Discussed the effects of alcohol on a bariatric patient and the increased impairment risk  · Keep up the good work!      Postsurgical Malabsorption   -At risk for malabsorption of vitamins/minerals secondary to malabsorption and restriction of intake from bariatric surgery  - not Currently taking adequate postop bariatric surgery vitamin supplementation - she is inconsistent with vitamins; advised she switch out from gummies to chews or capsules containing iron   -Last set of bariatric labs completed 3/2022- advised adding 2000 IU d3  - SEPARATE IRON FROM CALCIUM   - repeat labs in about 3 months  -Patient received education about the importance of adhering to a lifelong supplementation regimen to avoid vitamin/mineral deficiencies      Diagnoses and all orders for this visit:    Encounter for surgical aftercare following surgery of digestive system  -     Zinc; Future  -     Vitamin D 25 hydroxy; Future  -     Vitamin B12; Future  -     Vitamin B1, whole blood; Future  -     Vitamin A; Future  -     PTH, intact; Future  -     CBC and Platelet; Future  -     Comprehensive metabolic panel; Future  -     Ferritin; Future  -     Folate; Future  -     Iron Saturation %; Future    Bariatric surgery status  -     Zinc; Future  -     Vitamin D 25 hydroxy; Future  -     Vitamin B12; Future  -     Vitamin B1, whole blood; Future  -     Vitamin A; Future  -     PTH, intact; Future  -     CBC and Platelet; Future  -     Comprehensive metabolic panel; Future  -     Ferritin; Future  -     Folate; Future  -     Iron Saturation %; Future    Postsurgical malabsorption  -     Zinc; Future  -     Vitamin D 25 hydroxy; Future  -     Vitamin B12; Future  -     Vitamin B1, whole blood; Future  -     Vitamin A; Future  -     PTH, intact; Future  -     CBC and Platelet; Future  -     Comprehensive metabolic panel; Future  -     Ferritin; Future  -     Folate; Future  -     Iron Saturation %; Future    Thyroid nodule  -     Zinc; Future  -     Vitamin D 25 hydroxy; Future  -     Vitamin B12; Future  -     Vitamin B1, whole blood; Future  -     Vitamin A; Future  -     PTH, intact; Future  -     CBC and Platelet; Future  -     Comprehensive metabolic panel; Future  -     Ferritin; Future  -     Folate; Future  -     Iron Saturation %;  Future    Obesity, Class I, BMI 30-34 9  -     Zinc; Future  -     Vitamin D 25 hydroxy; Future  -     Vitamin B12; Future  -     Vitamin B1, whole blood; Future  -     Vitamin A; Future  -     PTH, intact; Future  -     CBC and Platelet; Future  -     Comprehensive metabolic panel; Future  -     Ferritin; Future  -     Folate; Future  -     Iron Saturation %; Future    Other orders  -     montelukast (SINGULAIR) 10 mg tablet; Take 10 mg by mouth daily         Subjective:      Patient ID: Ericka Huffman is a 32 y o  female  -s/p Vertical Sleeve Gastrectomy with Dr Mcdermott President on 11/22/2021  Presents to the office today for 3rd postop  Overall well    Initial:292 4  Current:206  EWL: (Weight loss is ahead of schedule at this post surgical period )  [place]  Current BMI is Body mass index is 34 36 kg/m²  · Tolerating a regular diet-yes  · Eating at least 60 grams of protein per day-likely not el  · Following 30/60 minute rule with liquids-yes  · Drinking at least 64 ounces of fluid per day-yes  · Drinking carbonated beverages-no  · Sufficient exercise-walking   · Using NSAIDs regularly-no  · Using nicotine-no  · Using alcohol-no  · Supplements: gummy MVI + biotin + iron pill 65 mg one a day + calcium chews (takes 2)    · EWL is 60%, which places the patient ahead of schedule for expected post surgical weight loss at this time  The following portions of the patient's history were reviewed and updated as appropriate: allergies, current medications, past family history, past medical history, past social history, past surgical history and problem list     Review of Systems   Constitutional: Positive for fatigue  Negative for chills and fever  Eyes: Negative  Respiratory: Negative  Cardiovascular: Negative  Gastrointestinal: Negative  Genitourinary: Positive for menstrual problem (heavy periods - advised increased iron to twice daily if able to tolerate )  Musculoskeletal: Negative  Skin: Negative      Neurological: Positive for dizziness (occasional - related to positional changes - advised at least 64 oz fluid daily )  Negative for numbness  Psychiatric/Behavioral: Negative  Objective:    /82 (BP Location: Left arm, Patient Position: Sitting, Cuff Size: Standard)   Pulse 89   Temp 98 2 °F (36 8 °C) (Temporal)   Ht 5' 5" (1 651 m)   Wt 93 7 kg (206 lb 8 oz)   BMI 34 36 kg/m²      Physical Exam  Vitals and nursing note reviewed  Constitutional:       Appearance: Normal appearance  She is obese  HENT:      Head: Normocephalic and atraumatic  Eyes:      Extraocular Movements: Extraocular movements intact  Pupils: Pupils are equal, round, and reactive to light  Cardiovascular:      Rate and Rhythm: Normal rate and regular rhythm  Pulmonary:      Effort: Pulmonary effort is normal       Breath sounds: Normal breath sounds  Abdominal:      General: Bowel sounds are normal       Tenderness: There is no abdominal tenderness  Musculoskeletal:         General: Normal range of motion  Cervical back: Normal range of motion  Skin:     General: Skin is warm and dry  Neurological:      General: No focal deficit present  Mental Status: She is alert and oriented to person, place, and time     Psychiatric:         Mood and Affect: Mood normal

## 2022-06-09 NOTE — PATIENT INSTRUCTIONS
Continued/Maintain healthy weight loss with good nutrition intakes  Adequate hydration with at least 64oz  fluid intake  Follow diet as discussed  Follow vitamin and mineral recommendations as reviewed with you  Exercise as tolerated

## 2022-06-24 ENCOUNTER — APPOINTMENT (OUTPATIENT)
Dept: LAB | Age: 27
End: 2022-06-24
Payer: COMMERCIAL

## 2022-06-24 DIAGNOSIS — K91.2 POSTSURGICAL MALABSORPTION: ICD-10-CM

## 2022-06-24 DIAGNOSIS — E04.1 THYROID NODULE: ICD-10-CM

## 2022-06-24 DIAGNOSIS — E66.9 OBESITY, CLASS I, BMI 30-34.9: ICD-10-CM

## 2022-06-24 DIAGNOSIS — E66.01 MORBID (SEVERE) OBESITY DUE TO EXCESS CALORIES (HCC): ICD-10-CM

## 2022-06-24 DIAGNOSIS — Z48.815 ENCOUNTER FOR SURGICAL AFTERCARE FOLLOWING SURGERY OF DIGESTIVE SYSTEM: ICD-10-CM

## 2022-06-24 DIAGNOSIS — Z01.818 PREOP TESTING: ICD-10-CM

## 2022-06-24 DIAGNOSIS — Z98.84 BARIATRIC SURGERY STATUS: ICD-10-CM

## 2022-06-24 LAB
25(OH)D3 SERPL-MCNC: 24.1 NG/ML (ref 30–100)
ALBUMIN SERPL BCP-MCNC: 3.3 G/DL (ref 3.5–5)
ALP SERPL-CCNC: 73 U/L (ref 46–116)
ALT SERPL W P-5'-P-CCNC: 24 U/L (ref 12–78)
ANION GAP SERPL CALCULATED.3IONS-SCNC: 6 MMOL/L (ref 4–13)
AST SERPL W P-5'-P-CCNC: 13 U/L (ref 5–45)
BASOPHILS # BLD AUTO: 0.07 THOUSANDS/ΜL (ref 0–0.1)
BASOPHILS NFR BLD AUTO: 1 % (ref 0–1)
BILIRUB SERPL-MCNC: 0.46 MG/DL (ref 0.2–1)
BUN SERPL-MCNC: 13 MG/DL (ref 5–25)
CALCIUM ALBUM COR SERPL-MCNC: 9.9 MG/DL (ref 8.3–10.1)
CALCIUM SERPL-MCNC: 9.3 MG/DL (ref 8.3–10.1)
CHLORIDE SERPL-SCNC: 106 MMOL/L (ref 100–108)
CHOLEST SERPL-MCNC: 209 MG/DL
CO2 SERPL-SCNC: 26 MMOL/L (ref 21–32)
CREAT SERPL-MCNC: 0.79 MG/DL (ref 0.6–1.3)
CRP SERPL QL: 7.4 MG/L
EOSINOPHIL # BLD AUTO: 0.54 THOUSAND/ΜL (ref 0–0.61)
EOSINOPHIL NFR BLD AUTO: 7 % (ref 0–6)
ERYTHROCYTE [DISTWIDTH] IN BLOOD BY AUTOMATED COUNT: 13.3 % (ref 11.6–15.1)
ERYTHROCYTE [SEDIMENTATION RATE] IN BLOOD: 37 MM/HOUR (ref 0–19)
FOLATE SERPL-MCNC: 9.2 NG/ML (ref 3.1–17.5)
GFR SERPL CREATININE-BSD FRML MDRD: 101 ML/MIN/1.73SQ M
GLUCOSE P FAST SERPL-MCNC: 82 MG/DL (ref 65–99)
HCT VFR BLD AUTO: 39 % (ref 34.8–46.1)
HDLC SERPL-MCNC: 38 MG/DL
HGB BLD-MCNC: 12.8 G/DL (ref 11.5–15.4)
IMM GRANULOCYTES # BLD AUTO: 0.02 THOUSAND/UL (ref 0–0.2)
IMM GRANULOCYTES NFR BLD AUTO: 0 % (ref 0–2)
LDLC SERPL CALC-MCNC: 152 MG/DL (ref 0–100)
LYMPHOCYTES # BLD AUTO: 2.36 THOUSANDS/ΜL (ref 0.6–4.47)
LYMPHOCYTES NFR BLD AUTO: 32 % (ref 14–44)
MCH RBC QN AUTO: 27.5 PG (ref 26.8–34.3)
MCHC RBC AUTO-ENTMCNC: 32.8 G/DL (ref 31.4–37.4)
MCV RBC AUTO: 84 FL (ref 82–98)
MONOCYTES # BLD AUTO: 0.52 THOUSAND/ΜL (ref 0.17–1.22)
MONOCYTES NFR BLD AUTO: 7 % (ref 4–12)
NEUTROPHILS # BLD AUTO: 3.97 THOUSANDS/ΜL (ref 1.85–7.62)
NEUTS SEG NFR BLD AUTO: 53 % (ref 43–75)
NONHDLC SERPL-MCNC: 171 MG/DL
NRBC BLD AUTO-RTO: 0 /100 WBCS
PLATELET # BLD AUTO: 253 THOUSANDS/UL (ref 149–390)
PMV BLD AUTO: 11.4 FL (ref 8.9–12.7)
POTASSIUM SERPL-SCNC: 4.1 MMOL/L (ref 3.5–5.3)
PROT SERPL-MCNC: 7.6 G/DL (ref 6.4–8.2)
RBC # BLD AUTO: 4.65 MILLION/UL (ref 3.81–5.12)
SODIUM SERPL-SCNC: 138 MMOL/L (ref 136–145)
TRIGL SERPL-MCNC: 97 MG/DL
VIT B12 SERPL-MCNC: 406 PG/ML (ref 100–900)
WBC # BLD AUTO: 7.48 THOUSAND/UL (ref 4.31–10.16)

## 2022-06-24 PROCEDURE — 82746 ASSAY OF FOLIC ACID SERUM: CPT | Performed by: INTERNAL MEDICINE

## 2022-06-24 PROCEDURE — 82607 VITAMIN B-12: CPT | Performed by: INTERNAL MEDICINE

## 2022-06-24 PROCEDURE — 36415 COLL VENOUS BLD VENIPUNCTURE: CPT | Performed by: INTERNAL MEDICINE

## 2022-06-24 PROCEDURE — 85025 COMPLETE CBC W/AUTO DIFF WBC: CPT | Performed by: INTERNAL MEDICINE

## 2022-06-24 PROCEDURE — 80061 LIPID PANEL: CPT

## 2022-06-24 PROCEDURE — 85652 RBC SED RATE AUTOMATED: CPT | Performed by: INTERNAL MEDICINE

## 2022-06-24 PROCEDURE — 82306 VITAMIN D 25 HYDROXY: CPT | Performed by: INTERNAL MEDICINE

## 2022-06-24 PROCEDURE — 86140 C-REACTIVE PROTEIN: CPT | Performed by: INTERNAL MEDICINE

## 2022-06-24 PROCEDURE — 80053 COMPREHEN METABOLIC PANEL: CPT | Performed by: INTERNAL MEDICINE

## 2022-06-29 PROBLEM — N92.1 MENORRHAGIA WITH IRREGULAR CYCLE: Status: ACTIVE | Noted: 2022-06-29

## 2022-06-29 PROBLEM — Z01.419 WOMEN'S ANNUAL ROUTINE GYNECOLOGICAL EXAMINATION: Status: ACTIVE | Noted: 2022-06-29

## 2022-06-29 NOTE — PROGRESS NOTES
Assessment/Plan:  NGE  Bilateral Lennox's sign, bilateral Calf pain- Dopplers 12/20 negative, did not return for the 1 month visit  Now desires to conceive - rec PNV's, Dr Katina Mantilla [ Hx PCOS ] soon,  neg PT today        Irregular menses  BMI 49- already seeing bariatrics, rec Wt loss before conception; 60 lb wt gain '20  BMI 34 lost 60 since 11/21 Sleeve Gastrectomy   PCOS - previous pt of [facility]  Hirsutism - Laser treatment #2 out of 6 tomorrow  HTN - noncompliance with medication  Pap smear q 3yrs  '21  Cervical Cultures till 22 -also desired STD testing                                                                           RTO 1 yr  SBA monthly                                                                              Exercise 3/ wk                                                                                        Calcium 1,000 mg/d with Vit D                     Depression Screen: Neg                            Diagnoses and all orders for this visit:    Women's annual routine gynecological examination  -     Liquid-based pap, screening    Screening for cervical cancer  -     Liquid-based pap, screening    Menorrhagia with irregular cycle    Polycystic ovarian syndrome    Primary hypertension    Amenorrhea  -     POCT urine HCG    Screening for STD (sexually transmitted disease)  -     Chlamydia/GC amplified DNA by PCR  -     Cancel: Hepatitis B surface antigen; Future  -     Cancel: Hepatitis C antibody; Future  -     Human Immunodeficiency Virus 1/2 Antigen / Antibody ( Fourth Generation) with Reflex Testing; Future  -     RPR; Future              Subjective:        Patient ID: Regulo Silva is a 32 y o  female  Florinda Mccurdy returns for an annual visit  I last saw her in December of 2020 and asked her to return the following month for further care  She desires to conceive at this point    She has been inconsistently using oral contraceptives this year  She lost 60 lb since a gastric sleeve procedure in November  She has a history of PCOS and Hirsutism  Her menses are irregular  Her last menstrual period was May 1st to 6th and considered normal   She had a very heavy menses in March requiring TXA  The following portions of the patient's history were reviewed and updated as appropriate: She  has a past medical history of GERD (gastroesophageal reflux disease), HL (hearing loss), PCOS (polycystic ovarian syndrome), Tinnitus, and Wears glasses  Patient Active Problem List    Diagnosis Date Noted    Women's annual routine gynecological examination 06/29/2022    Menorrhagia with irregular cycle 06/29/2022    GERD (gastroesophageal reflux disease)     Thyroid nodule 03/06/2021    Pain in both lower extremities 03/04/2021    Leg swelling 03/04/2021    Morbid obesity with BMI of 40 0-44 9, adult ([facility] [address] ) 02/17/2021    HTN (hypertension) 10/08/2020    ASCENCION (obstructive sleep apnea)     Mixed hyperlipidemia 05/01/2017    Polycystic ovarian syndrome 04/17/2014   PMH:  Menarche 8  Amenorrhea 14  PCOS 15 [cysts on TV US 18]  Cycled with OC's 16  HTN 21 '14  Back- DDD, L3-5 '16  Morbid Obesity BMI 49 35  + Lennox's sign bilaterally - annual visit 12/20 - negative Dopplers  EGD 2/21  Laparoscopic sleeve gastrectomy 11/21   Pneumonia 11/21  She  has a past surgical history that includes No past surgeries; US guided thyroid biopsy (10/4/2021); EGD; and pr lap, dionne restrict proc, longitudinal gastrectomy (N/A, 11/22/2021)  Her family history includes Diabetes in her father; Hyperlipidemia in her father; Hypertension in her father; Sarcoidosis in her mother; Thyroid disease in her mother  FH:  F- HTN, DM, Hyperlipidemia, 3 CVA '21 50  She  reports that she quit smoking about 5 years ago  Her smoking use included cigarettes  She has never used smokeless tobacco  She reports previous alcohol use   She reports that she does not use drugs  SH:   to Jose, a , '17  Andrés Negro, also works for [employer]  Non smoker  Current Outpatient Medications   Medication Sig Dispense Refill    Junel FE 1/20 1-20 MG-MCG per tablet       montelukast (SINGULAIR) 10 mg tablet Take 10 mg by mouth daily      Multiple Vitamin (MULTI-VITAMINS PO) Take by mouth      Tranexamic Acid (Lysteda) 650 MG TABS Two po TID x 5 days PRN for heavy menstrual bleeding 30 tablet 3    norethindrone-ethinyl estradiol (Loestrin Fe 1/20) 1-20 MG-MCG per tablet Take 1 tablet by mouth daily (Patient not taking: No sig reported) 90 tablet 1     No current facility-administered medications for this visit  Current Outpatient Medications on File Prior to Visit   Medication Sig    Junel FE 1/20 1-20 MG-MCG per tablet     montelukast (SINGULAIR) 10 mg tablet Take 10 mg by mouth daily    Multiple Vitamin (MULTI-VITAMINS PO) Take by mouth    Tranexamic Acid (Lysteda) 650 MG TABS Two po TID x 5 days PRN for heavy menstrual bleeding    norethindrone-ethinyl estradiol (Loestrin Fe 1/20) 1-20 MG-MCG per tablet Take 1 tablet by mouth daily (Patient not taking: No sig reported)     No current facility-administered medications on file prior to visit  She has No Known Allergies       Review of Systems   Constitutional: Negative for activity change, appetite change, fatigue and unexpected weight change  Eyes: Negative for visual disturbance  Respiratory: Negative for cough, chest tightness, shortness of breath and wheezing  Cardiovascular: Negative for chest pain, palpitations and leg swelling  Breast: Patient denies tenderness, nipple discharge, masses, or erythema  Gastrointestinal: Negative for abdominal distention, abdominal pain, blood in stool, constipation, diarrhea, nausea and vomiting  Endocrine: Negative for cold intolerance and heat intolerance     Genitourinary: Negative for decreased urine volume, difficulty urinating, dyspareunia, dysuria, frequency, hematuria, menstrual problem, pelvic pain, urgency, vaginal bleeding, vaginal discharge and vaginal pain  Musculoskeletal: Negative for arthralgias  Skin: Negative for rash  Neurological: Positive for dizziness (Every other day  )  Negative for weakness, light-headedness, numbness and headaches  Hematological: Does not bruise/bleed easily  Psychiatric/Behavioral: Negative for agitation, behavioral problems and sleep disturbance  The patient is not nervous/anxious  Objective:    Vitals:    06/30/22 1122   BP: 122/90   Weight: 94 6 kg (208 lb 9 6 oz)            Physical Exam  Vitals and nursing note reviewed  Constitutional:       General: She is not in acute distress  Appearance: She is well-developed  HENT:      Head: Normocephalic and atraumatic  Eyes:      General: No scleral icterus  Right eye: No discharge  Left eye: No discharge  Extraocular Movements: Extraocular movements intact  Conjunctiva/sclera: Conjunctivae normal    Neck:      Thyroid: No thyromegaly  Trachea: No tracheal deviation  Cardiovascular:      Rate and Rhythm: Normal rate and regular rhythm  Heart sounds: Normal heart sounds  No murmur heard  Pulmonary:      Effort: Pulmonary effort is normal  No respiratory distress  Breath sounds: Normal breath sounds  No wheezing  Chest:   Breasts: Breasts are symmetrical       Right: No inverted nipple, mass, nipple discharge, skin change or tenderness  Left: No inverted nipple, mass, nipple discharge, skin change or tenderness  Abdominal:      General: Abdomen is flat  Bowel sounds are normal  There is no distension  Palpations: Abdomen is soft  There is no mass  Tenderness: There is no abdominal tenderness  There is no guarding or rebound  Genitourinary:     General: Normal vulva  Labia:         Right: No rash, tenderness or lesion  Left: No rash, tenderness or lesion  Vagina: Normal       Cervix: No cervical motion tenderness or discharge  Uterus: Not deviated, not enlarged and not tender  Adnexa:         Right: No mass, tenderness or fullness  Left: No mass, tenderness or fullness  Rectum: No external hemorrhoid  Comments: Urethral meatus within normal limits  Perineum within normal limits  Bladder well supported  Musculoskeletal:         General: No tenderness  Normal range of motion  Cervical back: Normal range of motion and neck supple  Lymphadenopathy:      Cervical: No cervical adenopathy  Skin:     General: Skin is warm and dry  Comments: Chin hair growth   Neurological:      Mental Status: She is alert and oriented to person, place, and time  Psychiatric:         Mood and Affect: Mood normal          Behavior: Behavior normal          Thought Content:  Thought content normal          Judgment: Judgment normal

## 2022-06-30 ENCOUNTER — ANNUAL EXAM (OUTPATIENT)
Dept: OBGYN CLINIC | Facility: CLINIC | Age: 27
End: 2022-06-30
Payer: COMMERCIAL

## 2022-06-30 VITALS — SYSTOLIC BLOOD PRESSURE: 122 MMHG | WEIGHT: 208.6 LBS | BODY MASS INDEX: 34.71 KG/M2 | DIASTOLIC BLOOD PRESSURE: 90 MMHG

## 2022-06-30 DIAGNOSIS — Z12.4 SCREENING FOR CERVICAL CANCER: ICD-10-CM

## 2022-06-30 DIAGNOSIS — N91.2 AMENORRHEA: ICD-10-CM

## 2022-06-30 DIAGNOSIS — Z11.3 SCREENING FOR STD (SEXUALLY TRANSMITTED DISEASE): ICD-10-CM

## 2022-06-30 DIAGNOSIS — Z01.419 WOMEN'S ANNUAL ROUTINE GYNECOLOGICAL EXAMINATION: Primary | ICD-10-CM

## 2022-06-30 DIAGNOSIS — E28.2 POLYCYSTIC OVARIAN SYNDROME: ICD-10-CM

## 2022-06-30 DIAGNOSIS — N92.1 MENORRHAGIA WITH IRREGULAR CYCLE: ICD-10-CM

## 2022-06-30 DIAGNOSIS — I10 PRIMARY HYPERTENSION: ICD-10-CM

## 2022-06-30 LAB — SL AMB POCT URINE HCG: NORMAL

## 2022-06-30 PROCEDURE — G0145 SCR C/V CYTO,THINLAYER,RESCR: HCPCS | Performed by: OBSTETRICS & GYNECOLOGY

## 2022-06-30 PROCEDURE — 81025 URINE PREGNANCY TEST: CPT | Performed by: OBSTETRICS & GYNECOLOGY

## 2022-06-30 PROCEDURE — 87491 CHLMYD TRACH DNA AMP PROBE: CPT | Performed by: OBSTETRICS & GYNECOLOGY

## 2022-06-30 PROCEDURE — S0612 ANNUAL GYNECOLOGICAL EXAMINA: HCPCS | Performed by: OBSTETRICS & GYNECOLOGY

## 2022-06-30 PROCEDURE — 87591 N.GONORRHOEAE DNA AMP PROB: CPT | Performed by: OBSTETRICS & GYNECOLOGY

## 2022-07-05 LAB
C TRACH DNA SPEC QL NAA+PROBE: NEGATIVE
N GONORRHOEA DNA SPEC QL NAA+PROBE: NEGATIVE

## 2022-07-07 LAB
LAB AP GYN PRIMARY INTERPRETATION: NORMAL
Lab: NORMAL

## 2022-12-01 ENCOUNTER — APPOINTMENT (OUTPATIENT)
Dept: LAB | Facility: CLINIC | Age: 27
End: 2022-12-01

## 2022-12-01 ENCOUNTER — TRANSCRIBE ORDERS (OUTPATIENT)
Dept: LAB | Facility: CLINIC | Age: 27
End: 2022-12-01

## 2022-12-01 DIAGNOSIS — Z11.3 SCREENING FOR STD (SEXUALLY TRANSMITTED DISEASE): ICD-10-CM

## 2022-12-01 DIAGNOSIS — Z11.3 SCREENING FOR STD (SEXUALLY TRANSMITTED DISEASE): Primary | ICD-10-CM

## 2022-12-01 LAB
25(OH)D3 SERPL-MCNC: 13.2 NG/ML (ref 30–100)
ALBUMIN SERPL BCP-MCNC: 3.2 G/DL (ref 3.5–5)
ALP SERPL-CCNC: 63 U/L (ref 46–116)
ALT SERPL W P-5'-P-CCNC: 27 U/L (ref 12–78)
ANION GAP SERPL CALCULATED.3IONS-SCNC: 4 MMOL/L (ref 4–13)
AST SERPL W P-5'-P-CCNC: 9 U/L (ref 5–45)
BILIRUB SERPL-MCNC: 0.54 MG/DL (ref 0.2–1)
BUN SERPL-MCNC: 18 MG/DL (ref 5–25)
CALCIUM ALBUM COR SERPL-MCNC: 9.7 MG/DL (ref 8.3–10.1)
CALCIUM SERPL-MCNC: 9.1 MG/DL (ref 8.3–10.1)
CHLORIDE SERPL-SCNC: 107 MMOL/L (ref 96–108)
CO2 SERPL-SCNC: 26 MMOL/L (ref 21–32)
CREAT SERPL-MCNC: 0.84 MG/DL (ref 0.6–1.3)
ERYTHROCYTE [DISTWIDTH] IN BLOOD BY AUTOMATED COUNT: 12.7 % (ref 11.6–15.1)
FERRITIN SERPL-MCNC: 18 NG/ML (ref 8–388)
FOLATE SERPL-MCNC: 4 NG/ML (ref 3.1–17.5)
GFR SERPL CREATININE-BSD FRML MDRD: 94 ML/MIN/1.73SQ M
GLUCOSE P FAST SERPL-MCNC: 93 MG/DL (ref 65–99)
HCT VFR BLD AUTO: 43.6 % (ref 34.8–46.1)
HGB BLD-MCNC: 13.9 G/DL (ref 11.5–15.4)
IRON SATN MFR SERPL: 14 % (ref 15–50)
IRON SERPL-MCNC: 58 UG/DL (ref 50–170)
MCH RBC QN AUTO: 27.5 PG (ref 26.8–34.3)
MCHC RBC AUTO-ENTMCNC: 31.9 G/DL (ref 31.4–37.4)
MCV RBC AUTO: 86 FL (ref 82–98)
PLATELET # BLD AUTO: 261 THOUSANDS/UL (ref 149–390)
PMV BLD AUTO: 11.3 FL (ref 8.9–12.7)
POTASSIUM SERPL-SCNC: 3.8 MMOL/L (ref 3.5–5.3)
PROT SERPL-MCNC: 8.4 G/DL (ref 6.4–8.4)
PTH-INTACT SERPL-MCNC: 51.8 PG/ML (ref 18.4–80.1)
RBC # BLD AUTO: 5.06 MILLION/UL (ref 3.81–5.12)
SODIUM SERPL-SCNC: 137 MMOL/L (ref 135–147)
TIBC SERPL-MCNC: 402 UG/DL (ref 250–450)
VIT B12 SERPL-MCNC: 183 PG/ML (ref 100–900)
WBC # BLD AUTO: 6.97 THOUSAND/UL (ref 4.31–10.16)

## 2022-12-02 LAB
HIV 1+2 AB+HIV1 P24 AG SERPL QL IA: NORMAL
RPR SER QL: NORMAL

## 2022-12-04 LAB
VIT A SERPL-MCNC: 42.9 UG/DL (ref 18.9–57.3)
ZINC SERPL-MCNC: 53 UG/DL (ref 44–115)

## 2022-12-05 ENCOUNTER — TELEPHONE (OUTPATIENT)
Dept: BARIATRICS | Facility: CLINIC | Age: 27
End: 2022-12-05

## 2022-12-05 DIAGNOSIS — Z98.84 BARIATRIC SURGERY STATUS: ICD-10-CM

## 2022-12-05 DIAGNOSIS — E55.9 VITAMIN D DEFICIENCY: Primary | ICD-10-CM

## 2022-12-05 DIAGNOSIS — K91.2 POSTSURGICAL MALABSORPTION: ICD-10-CM

## 2022-12-05 LAB — VIT B1 BLD-SCNC: 103.6 NMOL/L (ref 66.5–200)

## 2022-12-05 RX ORDER — ERGOCALCIFEROL 1.25 MG/1
50000 CAPSULE ORAL
Qty: 24 CAPSULE | Refills: 0 | Status: SHIPPED | OUTPATIENT
Start: 2022-12-05 | End: 2023-02-27

## 2022-12-05 NOTE — TELEPHONE ENCOUNTER
Reached out to Pt re: labs  Reviewed lab results and PA instructions  Pt confirmed she is interested in vitamin B-12 injections  Pt reported feeling extremely tired and feeling "foggy " Told Pt we will be in touch tomorrow to schedule injections  Sent PA instructions to Pt via [facility], as per Pt request  Pt verbalized understanding and was in agreement with plan

## 2022-12-06 ENCOUNTER — TELEPHONE (OUTPATIENT)
Dept: BARIATRICS | Facility: CLINIC | Age: 27
End: 2022-12-06

## 2022-12-06 DIAGNOSIS — E53.8 VITAMIN B12 DEFICIENCY: ICD-10-CM

## 2022-12-06 DIAGNOSIS — Z98.84 BARIATRIC SURGERY STATUS: Primary | ICD-10-CM

## 2022-12-06 DIAGNOSIS — K91.2 POSTSURGICAL MALABSORPTION: ICD-10-CM

## 2022-12-06 RX ORDER — CYANOCOBALAMIN 1000 UG/ML
250 INJECTION, SOLUTION INTRAMUSCULAR; SUBCUTANEOUS SEE ADMIN INSTRUCTIONS
Status: SHIPPED | OUTPATIENT
Start: 2022-12-06

## 2022-12-07 ENCOUNTER — TELEPHONE (OUTPATIENT)
Dept: BARIATRICS | Facility: CLINIC | Age: 27
End: 2022-12-07

## 2022-12-07 NOTE — TELEPHONE ENCOUNTER
Reached out to Pt re: B-12 injections  Scheduled Pt for 4 weekly visits and 3 monthly  Pt verbalized understanding and was in agreement with plan

## 2022-12-09 ENCOUNTER — OFFICE VISIT (OUTPATIENT)
Dept: BARIATRICS | Facility: CLINIC | Age: 27
End: 2022-12-09

## 2022-12-09 VITALS
SYSTOLIC BLOOD PRESSURE: 118 MMHG | TEMPERATURE: 96.8 F | HEIGHT: 65 IN | DIASTOLIC BLOOD PRESSURE: 80 MMHG | BODY MASS INDEX: 31.49 KG/M2 | OXYGEN SATURATION: 99 % | HEART RATE: 81 BPM | WEIGHT: 189 LBS

## 2022-12-09 DIAGNOSIS — R10.32 LEFT LOWER QUADRANT ABDOMINAL PAIN: ICD-10-CM

## 2022-12-09 DIAGNOSIS — E53.8 VITAMIN B12 DEFICIENCY: ICD-10-CM

## 2022-12-09 DIAGNOSIS — E53.8 LOW SERUM VITAMIN B12: ICD-10-CM

## 2022-12-09 DIAGNOSIS — Z48.815 ENCOUNTER FOR SURGICAL AFTERCARE FOLLOWING SURGERY OF DIGESTIVE SYSTEM: Primary | ICD-10-CM

## 2022-12-09 DIAGNOSIS — E66.9 OBESITY, CLASS I, BMI 30-34.9: ICD-10-CM

## 2022-12-09 DIAGNOSIS — Z98.84 BARIATRIC SURGERY STATUS: ICD-10-CM

## 2022-12-09 DIAGNOSIS — K91.2 POSTSURGICAL MALABSORPTION: ICD-10-CM

## 2022-12-09 RX ADMIN — CYANOCOBALAMIN 250 MCG: 1000 INJECTION, SOLUTION INTRAMUSCULAR; SUBCUTANEOUS at 11:29

## 2022-12-09 NOTE — PATIENT INSTRUCTIONS
Follow-up in 6 months   We kindly ask that your arrive 15 minutes before your scheduled appointment time with your provider to allow our staff to room you, get your vital signs and update your chart  Get lab work done   Please call the office if you need a script  It is recommended to check with your insurance BEFORE getting labs done to make sure they are covered by your policy  Call our office if you have any problems with abdominal pain especially associated with fever, chills, nausea, vomiting or any other concerns  All  Post-bariatric surgery patients should be aware that very small quantities of any alcohol can cause impairment and it is very possible not to feel the effect  The effect can be in the system for several hours  It is also a stomach irritant  It is advised to AVOID alcohol, Nonsteroidal antiinflammatory drugs (NSAIDS) and nicotine of all forms   Any of these can cause stomach irritation/pain  Discussed the effects of alcohol on a bariatric patient and the increased impairment risk  Keep up the good work!

## 2022-12-09 NOTE — PROGRESS NOTES
Assessment/Plan:     Patient ID: Roxane Gustafson is a 32 y o  female  Bariatric Surgery Status    -s/p Vertical Cecille De Luna 11/22/2021  Presents to the office today for annual    C/o LLQ intermittent" pain for the past few months, pain feels sharp and like a spasm  Worse with deep inspiration  Unrelated to bowel movements or to eating  Denies N/V/D/C  Saw GYN a few months ago and was wnl  Her last CT abd/pelvis was 8/2021 showing:   Prominence of an epiploic appendage with associated fat stranding in the region of the mid descending colon (coronal image 62, series 601, axial image 52, series 2), as described; suspicious for epiploic appendagitis      No discrete pericolonic abscess is seen      Small hiatal hernia, and other findings as above  Patient was seen by GI after her CT findings and no definitive cause was found for her symptoms  Will plan to repeat CT scan , plan to review with our surgeons and will decide on next step       Thyroid nodule   - following with endocrinology    · Continued/Maintain healthy weight loss with good nutrition intakes  · Adequate hydration with at least 64oz  fluid intake  · Follow diet as discussed  · Follow vitamin and mineral recommendations as reviewed with you  · Exercise as tolerated  · Colonoscopy referral made: n/a    · Follow-up in 6 months   We kindly ask that your arrive 15 minutes before your scheduled appointment time with your provider to allow our staff to room you, get your vital signs and update your chart  · Get lab work done   Please call the office if you need a script  It is recommended to check with your insurance BEFORE getting labs done to make sure they are covered by your policy  · Call our office if you have any problems with abdominal pain especially associated with fever, chills, nausea, vomiting or any other concerns      · All  Post-bariatric surgery patients should be aware that very small quantities of any alcohol can cause impairment and it is very possible not to feel the effect  The effect can be in the system for several hours  It is also a stomach irritant  · It is advised to AVOID alcohol, Nonsteroidal antiinflammatory drugs (NSAIDS) and nicotine of all forms   Any of these can cause stomach irritation/pain  · Discussed the effects of alcohol on a bariatric patient and the increased impairment risk  · Keep up the good work! Postsurgical Malabsorption   -At risk for malabsorption of vitamins/minerals secondary to malabsorption and restriction of intake from bariatric surgery  -NOT Currently taking adequate postop bariatric surgery vitamin supplementation - states she has trouble tolerating any vitamins due to smell  Can only tolerate only gummies  For now advised patient go back on gummies if this is what she can handle  Will also resume her calcium chews + iron pill + Rx vit D I sent to her pharmacy  -Last set of bariatric labs completed 11/2022  Will repeat her labs in 6 months but I will repeat in b12 in about 3 months once injections are complete   -Patient received education about the importance of adhering to a lifelong supplementation regimen to avoid vitamin/mineral deficiencies      Diagnoses and all orders for this visit:    Encounter for surgical aftercare following surgery of digestive system  -     Vitamin B12; Future    Bariatric surgery status  -     CT abdomen pelvis w contrast; Future  -     Vitamin B12; Future    Postsurgical malabsorption  -     Vitamin B12; Future    Obesity, Class I, BMI 30-34 9    Left lower quadrant abdominal pain  -     CT abdomen pelvis w contrast; Future    Low serum vitamin B12  -     Vitamin B12; Future         Subjective:      Patient ID: Charmayne Andreas is a 32 y o  female       -s/p Vertical Ana Laura Damian 11/22/2021   Presents to the office today for annual    Initial:292 4  Current:206  EWL: (Weight loss is ahead of schedule at this post surgical period )  Current BMI is Body mass index is 31 45 kg/m²  · Tolerating a regular diet-yes  · Eating at least 60 grams of protein per day-no  · Following 30/60 minute rule with liquids-yes  · Drinking at least 64 ounces of fluid per day-yes  · Drinking carbonated beverages-no  · Sufficient exercise-gym once a week and staying active   · Using NSAIDs regularly-no  · Using nicotine-no  · Using alcohol-no  · Supplements: none currently     · EWL is 73%, which places the patient ahead of schedule for expected post surgical weight loss at this time  The following portions of the patient's history were reviewed and updated as appropriate: allergies, current medications, past family history, past medical history, past social history, past surgical history and problem list     Review of Systems   Constitutional: Negative  Respiratory: Negative  Cardiovascular: Negative  Gastrointestinal: Positive for abdominal pain  Negative for blood in stool, constipation, diarrhea, nausea and vomiting  Neurological: Negative  Psychiatric/Behavioral: Negative  Objective:    /80 (BP Location: Left arm, Patient Position: Sitting, Cuff Size: Large)   Pulse 81   Temp (!) 96 8 °F (36 °C) (Tympanic)   Ht 5' 5" (1 651 m)   Wt 85 7 kg (189 lb)   SpO2 99%   BMI 31 45 kg/m²      Physical Exam  Vitals and nursing note reviewed  Constitutional:       Appearance: Normal appearance  She is obese  HENT:      Head: Normocephalic and atraumatic  Eyes:      Extraocular Movements: Extraocular movements intact  Pupils: Pupils are equal, round, and reactive to light  Cardiovascular:      Rate and Rhythm: Normal rate and regular rhythm  Pulmonary:      Effort: Pulmonary effort is normal       Breath sounds: Normal breath sounds  Abdominal:      General: Bowel sounds are normal       Tenderness: There is no abdominal tenderness     Musculoskeletal:         General: Normal range of motion  Cervical back: Normal range of motion  Skin:     General: Skin is warm and dry  Neurological:      General: No focal deficit present  Mental Status: She is alert and oriented to person, place, and time     Psychiatric:         Mood and Affect: Mood normal

## 2022-12-16 ENCOUNTER — OFFICE VISIT (OUTPATIENT)
Dept: BARIATRICS | Facility: CLINIC | Age: 27
End: 2022-12-16

## 2022-12-16 DIAGNOSIS — E53.8 VITAMIN B12 DEFICIENCY: ICD-10-CM

## 2022-12-16 RX ADMIN — CYANOCOBALAMIN 250 MCG: 1000 INJECTION, SOLUTION INTRAMUSCULAR; SUBCUTANEOUS at 14:38

## 2022-12-23 ENCOUNTER — OFFICE VISIT (OUTPATIENT)
Dept: BARIATRICS | Facility: CLINIC | Age: 27
End: 2022-12-23

## 2022-12-23 DIAGNOSIS — E53.8 VITAMIN B12 DEFICIENCY: Primary | ICD-10-CM

## 2022-12-23 DIAGNOSIS — E53.8 VITAMIN B12 DEFICIENCY: ICD-10-CM

## 2022-12-23 DIAGNOSIS — E66.01 MORBID OBESITY WITH BMI OF 40.0-44.9, ADULT (HCC): ICD-10-CM

## 2022-12-23 RX ORDER — CYANOCOBALAMIN 1000 UG/ML
250 INJECTION, SOLUTION INTRAMUSCULAR; SUBCUTANEOUS
Status: SHIPPED | OUTPATIENT
Start: 2022-12-23

## 2022-12-23 RX ORDER — CYANOCOBALAMIN 1000 UG/ML
250 INJECTION, SOLUTION INTRAMUSCULAR; SUBCUTANEOUS
Status: DISCONTINUED | OUTPATIENT
Start: 2022-12-26 | End: 2022-12-23

## 2022-12-23 RX ADMIN — CYANOCOBALAMIN 250 MCG: 1000 INJECTION, SOLUTION INTRAMUSCULAR; SUBCUTANEOUS at 13:57

## 2022-12-30 ENCOUNTER — OFFICE VISIT (OUTPATIENT)
Dept: BARIATRICS | Facility: CLINIC | Age: 27
End: 2022-12-30

## 2022-12-30 DIAGNOSIS — E66.9 OBESITY, CLASS I, BMI 30-34.9: Primary | ICD-10-CM

## 2022-12-30 DIAGNOSIS — E53.8 VITAMIN B12 DEFICIENCY: ICD-10-CM

## 2022-12-30 RX ADMIN — CYANOCOBALAMIN 250 MCG: 1000 INJECTION, SOLUTION INTRAMUSCULAR; SUBCUTANEOUS at 13:10

## 2023-01-06 ENCOUNTER — OFFICE VISIT (OUTPATIENT)
Dept: BARIATRICS | Facility: CLINIC | Age: 28
End: 2023-01-06

## 2023-01-06 VITALS — WEIGHT: 191.4 LBS | BODY MASS INDEX: 31.89 KG/M2 | HEIGHT: 65 IN

## 2023-01-06 DIAGNOSIS — Z98.84 BARIATRIC SURGERY STATUS: Primary | ICD-10-CM

## 2023-01-06 NOTE — PROGRESS NOTES
Bariatric Follow Up Nutrition Note    Referred for review of post op vitamins and minerals and dietary sources of protein     Type of surgery  Vertical sleeve gastrectomy  Surgery Date: 2021  14 months  post-op  Surgeon: Dr Ericka Musa  32 y o   female  not currently breastfeeding     Ht 5' 5" (1 651 m)   Wt 86 8 kg (191 lb 6 4 oz)   BMI 31 85 kg/m²       Wt Readings from Last 3 Encounters:   22 85 7 kg (189 lb)   22 94 6 kg (208 lb 9 6 oz)   22 93 7 kg (206 lb 8 oz)     [place] Equation:  1583 kcal per day   Estimated calories for weight maintenance : 1900 kcal per day  Estimated calories for weight loss 900-1400 kcal per day  ( 1-2# per wk wt loss - sedentary )  Estimated protein needs  grams per day (1 0-1 5 gms/kg IBW )   Estimated fluid needs 80 ounces per day (35 ml/kg IBW )     Weight on Day of Weight Loss Surgery: 270 5#  Weight in (lb) to have BMI = 25: 150 1#  Post-Op Wt Loss: 103 4#/ 73% EBWL in 14 month(s)    Review of History and Medications   Past Medical History:   Diagnosis Date   • GERD (gastroesophageal reflux disease)    • HL (hearing loss)    • PCOS (polycystic ovarian syndrome)    • Tinnitus    • Wears glasses      Past Surgical History:   Procedure Laterality Date   • EGD     • NO PAST SURGERIES     • IN LAPS GSTRC RSTRICTIV PX LONGITUDINAL GASTRECTOMY N/A 2021    Procedure: LAPAROSCOPIC SLEEVE GASTRECTOMY WITH ROBOTICS;  Surgeon: Mercy Monahan MD;  Location: [facility];  Service: Bariatrics   • US GUIDED THYROID BIOPSY  10/4/2021     Social History     Socioeconomic History   • Marital status: Single     Spouse name: Not on file   • Number of children: Not on file   • Years of education: Not on file   • Highest education level: Not on file   Occupational History   • Not on file   Tobacco Use   • Smoking status: Former     Types: Cigarettes     Quit date:      Years since quittin 0   • Smokeless tobacco: Never   • Tobacco comments:     patient admits to hx of 1 or 2 cigarettes a year  Used hookah   Vaping Use   • Vaping Use: Never used   Substance and Sexual Activity   • Alcohol use: Not Currently   • Drug use: No   • Sexual activity: Yes     Partners: Male     Birth control/protection: OCP   Other Topics Concern   • Not on file   Social History Narrative   • Not on file     Social Determinants of Health     Financial Resource Strain: Not on file   Food Insecurity: Not on file   Transportation Needs: Not on file   Physical Activity: Not on file   Stress: Not on file   Social Connections: Not on file   Intimate Partner Violence: Not on file   Housing Stability: Not on file       Current Outpatient Medications:   •  ergocalciferol (VITAMIN D2) 50,000 units, Take 1 capsule (50,000 Units total) by mouth 2 (two) times a week with meals, Disp: 24 capsule, Rfl: 0  •  Junel FE 1/20 1-20 MG-MCG per tablet, , Disp: , Rfl:   •  montelukast (SINGULAIR) 10 mg tablet, Take 10 mg by mouth daily As needed, Disp: , Rfl:   •  Multiple Vitamin (MULTI-VITAMINS PO), Take by mouth, Disp: , Rfl:   •  norethindrone-ethinyl estradiol (Loestrin Fe 1/20) 1-20 MG-MCG per tablet, Take 1 tablet by mouth daily, Disp: 90 tablet, Rfl: 1  •  Tranexamic Acid (Lysteda) 650 MG TABS, Two po TID x 5 days PRN for heavy menstrual bleeding, Disp: 30 tablet, Rfl: 3    Current Facility-Administered Medications:   •  cyanocobalamin injection 250 mcg, 250 mcg, Intramuscular, Once per day on Mon, Mikayla Ozuna PA-C, 250 mcg at 12/30/22 1310    Food Intake and Lifestyle Assessment   Food Intake Assessment completed via usual diet recall  Breakfast: Premier Shake or yogurt with granola or PB with oatmeal   Snack: 0   Lunch: sometimes a shake or banana + PB Frequently skips lunch   Snack: 0  Dinner: grilled chicken with broccoli , salad with chicken or tuna   Or healthy choice frozen , lean cuisine or smart one     Snack:  power crunch bar - over holidays was eating cookies but has stopped   Beverage intake: water, and coffee/tea  Diet texture/stage: regular  Protein supplement: Premier - most days for breakfast or am snack or lunch   Estimated protein intake per day: 60 gm with protein shake   Estimated fluid intake per day: 64 oz water and 8 oz coffee or none  Meals eaten away from home: rarely   Typical meal pattern: 2 meals per day and 1-2 snacks per day  Eating Behaviors: appropriate diet progression, appropriate portion sizes follows 30/60 rule  Some issues with grazing later in the day     Food allergies or intolerances: none  Cultural or Amish considerations: [place]    Physical Assessment  Nutrition Related Findings  Return of Hunger and feels hungry at end of the day  Still experiences good restriction and satiety at meals  Physical Activity  Types of exercise: gym once per week   Current physical limitations: none    Psychosocial Assessment   Support systems:   Socioeconomic factors: works [employer], [place]  Full time in [facility], [address] [place] [address] as needed    Nutrition Diagnosis  Diagnosis: Overweight / Obesity (NC-3 3)  Related to: Physical inactivity and Excessive energy intake  As Evidenced by: BMI >25     Nutrition Prescription: Recommend the following diet  9774-7218 kcal per day / 65-75 grams of protein per day   80 ounces or more of calorie free beverages    Provided with meal grids of 9049-4310 calorie per day      Interventions and Teaching   Patient educated on post-op nutrition guidelines  Patient educated and handouts provided    Surgical changes to stomach / GI  Capacity of post-surgery stomach  Diet progression  Adequate hydration  Sugar and fat restriction to decrease "dumping syndrome"  Fat restriction to decrease steatorrhea  Expected weight loss  Weight loss plateaus/ possibility of weight regain  Exercise  Suggestions for pre-op diet  Nutrition considerations after surgery  Protein supplements  Meal planning and preparation  Appropriate carbohydrate, protein, and fat intake, and food/fluid choices to maximize safe weight loss, nutrient intake, and tolerance   Dietary and lifestyle changes  Possible problems with poor eating habits  Intuitive eating  Techniques for self monitoring and keeping daily food journal  Potential for food intolerance after surgery, and ways to deal with them including: lactose intolerance, nausea, reflux, vomiting, diarrhea, food intolerance, appetite changes, gas  Vitamin / Mineral supplementation of Multivitamin with minerals and Vitamin D  Reviewed post op vitamin and mineral requirements  Recommended that she discontinue vitafusion gummie vitamins and minerals  Provided with bariatric capsules to try - Bariatric Advantage, Bariatric Fusion and procare samples   Educated patient that Premier protein contains 650 mg of calcium & vitamin D      Education provided to: patient    Barriers to learning: No barriers identified    Readiness to change: action     Comprehension: verbalizes and demonstrates understanding     Expected Compliance: good    Evaluation/Monitoring   Eating pattern as discussed Tolerance of nutrition prescription Body weight Lab values Physical activity Bowel pattern    Goals  Eliminate sugar sweetened beverages, Food journal, Exercise 30 minutes 5 times per week, Complete lession plans 1-6 and Eat 3 meals per day   9900-1217 kcal per day , 65-75 grams of protein   Recommend :  One bariatric vitamin and mineral capsule with 45 mg of iron, One Premier protein shake and one chewable calcium carbonate plus vitamin D supplement per day to meet her needs  F/U prn          Time Spent:   30 Minutes

## 2023-01-13 ENCOUNTER — HOSPITAL ENCOUNTER (OUTPATIENT)
Dept: RADIOLOGY | Facility: HOSPITAL | Age: 28
Discharge: HOME | End: 2023-01-13

## 2023-01-13 DIAGNOSIS — Z98.84 BARIATRIC SURGERY STATUS: ICD-10-CM

## 2023-01-13 DIAGNOSIS — R10.32 LEFT LOWER QUADRANT ABDOMINAL PAIN: ICD-10-CM

## 2023-01-27 ENCOUNTER — OFFICE VISIT (OUTPATIENT)
Dept: BARIATRICS | Facility: CLINIC | Age: 28
End: 2023-01-27

## 2023-01-27 DIAGNOSIS — E53.8 VITAMIN B12 DEFICIENCY: ICD-10-CM

## 2023-01-27 RX ADMIN — CYANOCOBALAMIN 250 MCG: 1000 INJECTION, SOLUTION INTRAMUSCULAR; SUBCUTANEOUS at 11:51

## 2023-01-27 NOTE — PROGRESS NOTES
Postsurgical Malabsorption   -At risk for malabsorption of vitamins/minerals secondary to malabsorption and restriction of intake from bariatric surgery  -NOT Currently taking adequate postop bariatric surgery vitamin supplementation - states she has trouble tolerating any vitamins due to smell  Can only tolerate only gummies  For now advised patient go back on gummies if this is what she can handle  Will also resume her calcium chews + iron pill + Rx vit D I sent to her pharmacy  -Last set of bariatric labs completed 11/2022  Will repeat her labs in 6 months but I will repeat in b12 in about 3 months once injections are complete   -Patient received education about the importance of adhering to a lifelong supplementation regimen to avoid vitamin/mineral deficiencies       Diagnoses and all orders for this visit:     Encounter for surgical aftercare following surgery of digestive system  -     Vitamin B12; Future     Bariatric surgery status  -     CT abdomen pelvis w contrast; Future  -     Vitamin B12; Future     Postsurgical malabsorption  -     Vitamin B12; Future      Pt received injection in left deltoid  Pt tolerated procedure well, no issues noted

## 2023-02-24 ENCOUNTER — OFFICE VISIT (OUTPATIENT)
Dept: BARIATRICS | Facility: CLINIC | Age: 28
End: 2023-02-24

## 2023-02-24 DIAGNOSIS — E53.8 VITAMIN B12 DEFICIENCY: ICD-10-CM

## 2023-02-24 RX ADMIN — CYANOCOBALAMIN 250 MCG: 1000 INJECTION, SOLUTION INTRAMUSCULAR; SUBCUTANEOUS at 13:08

## 2023-02-24 NOTE — PROGRESS NOTES
Left deltoid Patient tolerated well no issues noted      At risk for malabsorption of vitamins/minerals secondary to malabsorption and restriction of intake from bariatric surgery  -NOT Currently taking adequate postop bariatric surgery vitamin supplementation - states she has trouble tolerating any vitamins due to smell  Can only tolerate only gummies  For now advised patient go back on gummies if this is what she can handle  Will also resume her calcium chews + iron pill + Rx vit D I sent to her pharmacy  -Last set of bariatric labs completed 11/2022  Will repeat her labs in 6 months but I will repeat in b12 in about 3 months once injections are complete   -Patient received education about the importance of adhering to a lifelong supplementation regimen to avoid vitamin/mineral deficiencies       Diagnoses and all orders for this visit:     Encounter for surgical aftercare following surgery of digestive system  -     Vitamin B12; Future     Bariatric surgery status  -     CT abdomen pelvis w contrast; Future  -     Vitamin B12; Future     Postsurgical malabsorption  -     Vitamin B12; Future        Pt received injection in left deltoid  Pt tolerated procedure well, no issues

## 2023-03-24 ENCOUNTER — OFFICE VISIT (OUTPATIENT)
Dept: BARIATRICS | Facility: CLINIC | Age: 28
End: 2023-03-24

## 2023-03-24 DIAGNOSIS — E53.8 VITAMIN B12 DEFICIENCY: ICD-10-CM

## 2023-03-24 RX ADMIN — CYANOCOBALAMIN 250 MCG: 1000 INJECTION, SOLUTION INTRAMUSCULAR; SUBCUTANEOUS at 11:11

## 2023-03-24 NOTE — PROGRESS NOTES
Left deltoid patient tolerated well, no issues noted      At risk for malabsorption of vitamins/minerals secondary to malabsorption and restriction of intake from bariatric surgery  -NOT Currently taking adequate postop bariatric surgery vitamin supplementation - states she has trouble tolerating any vitamins due to smell  Can only tolerate only gummies  For now advised patient go back on gummies if this is what she can handle  Will also resume her calcium chews + iron pill + Rx vit D I sent to her pharmacy  -Last set of bariatric labs completed 11/2022   Will repeat her labs in 6 months but I will repeat in b12 in about 3 months once injections are complete   -Patient received education about the importance of adhering to a lifelong supplementation regimen to avoid vitamin/mineral deficiencies       Diagnoses and all orders for this visit:     Encounter for surgical aftercare following surgery of digestive system  -     Vitamin B12; Future     Bariatric surgery status  -     CT abdomen pelvis w contrast; Future  -     Vitamin B12; Future     Postsurgical malabsorption  -     Vitamin B12; Future

## 2023-05-05 ENCOUNTER — APPOINTMENT (OUTPATIENT)
Dept: LAB | Facility: CLINIC | Age: 28
End: 2023-05-05

## 2023-05-05 ENCOUNTER — TRANSCRIBE ORDERS (OUTPATIENT)
Dept: LAB | Facility: CLINIC | Age: 28
End: 2023-05-05

## 2023-05-05 DIAGNOSIS — Z48.815 ENCOUNTER FOR SURGICAL AFTERCARE FOLLOWING SURGERY OF DIGESTIVE SYSTEM: ICD-10-CM

## 2023-05-05 DIAGNOSIS — E04.1 NONTOXIC UNINODULAR GOITER: Primary | ICD-10-CM

## 2023-05-05 DIAGNOSIS — K91.2 POSTSURGICAL MALABSORPTION: ICD-10-CM

## 2023-05-05 DIAGNOSIS — E53.8 LOW SERUM VITAMIN B12: ICD-10-CM

## 2023-05-05 DIAGNOSIS — E04.1 NONTOXIC UNINODULAR GOITER: ICD-10-CM

## 2023-05-05 DIAGNOSIS — Z98.84 BARIATRIC SURGERY STATUS: ICD-10-CM

## 2023-05-05 LAB
TSH SERPL DL<=0.05 MIU/L-ACNC: 1.38 UIU/ML (ref 0.45–4.5)
VIT B12 SERPL-MCNC: 252 PG/ML (ref 100–900)

## 2023-06-12 ENCOUNTER — OFFICE VISIT (OUTPATIENT)
Dept: BARIATRICS | Facility: CLINIC | Age: 28
End: 2023-06-12
Payer: COMMERCIAL

## 2023-06-12 VITALS
BODY MASS INDEX: 32.07 KG/M2 | SYSTOLIC BLOOD PRESSURE: 110 MMHG | HEIGHT: 65 IN | DIASTOLIC BLOOD PRESSURE: 76 MMHG | HEART RATE: 74 BPM | TEMPERATURE: 97.2 F | WEIGHT: 192.5 LBS

## 2023-06-12 DIAGNOSIS — K91.2 POSTSURGICAL MALABSORPTION: ICD-10-CM

## 2023-06-12 DIAGNOSIS — R10.9 ABDOMINAL PAIN: ICD-10-CM

## 2023-06-12 DIAGNOSIS — E66.9 OBESITY, CLASS I, BMI 30-34.9: ICD-10-CM

## 2023-06-12 DIAGNOSIS — E04.1 THYROID NODULE: ICD-10-CM

## 2023-06-12 DIAGNOSIS — Z98.84 BARIATRIC SURGERY STATUS: ICD-10-CM

## 2023-06-12 DIAGNOSIS — E53.8 LOW VITAMIN B12 LEVEL: ICD-10-CM

## 2023-06-12 DIAGNOSIS — Z48.815 ENCOUNTER FOR SURGICAL AFTERCARE FOLLOWING SURGERY OF DIGESTIVE SYSTEM: Primary | ICD-10-CM

## 2023-06-12 DIAGNOSIS — Z98.84 BARIATRIC SURGERY STATUS: Primary | ICD-10-CM

## 2023-06-12 PROCEDURE — 99213 OFFICE O/P EST LOW 20 MIN: CPT | Performed by: PHYSICIAN ASSISTANT

## 2023-06-12 RX ORDER — ALBUTEROL SULFATE 90 UG/1
AEROSOL, METERED RESPIRATORY (INHALATION)
COMMUNITY
Start: 2023-05-31

## 2023-06-12 NOTE — PROGRESS NOTES
"Assessment/Plan:     Patient ID: Pat Ortiz is a 25 y o  female  Bariatric Surgery Status    -s/p Vertical Chiquita Nose Dr Nikkie Lund 11/22/2021  Presents to the office today for 18 month postop  Overall doing well but has been going through some personal stressors lately, so has been eating more fast food and not exercising  She is interested in meeting with the SW to help getting back on track  Will schedule  At her last visit, patient had c/o LLQ intermittent\" pain for the past few months, pain feels sharp and like a spasm  Worse with deep inspiration  Unrelated to bowel movements or to eating  Denies N/V/D/C  Saw GYN a few months ago and was wnl  Her last CT abd/pelvis was 8/2021 showing:   Prominence of an epiploic appendage with associated fat stranding in the region of the mid descending colon (coronal image 62, series 601, axial image 52, series 2), as described; suspicious for epiploic appendagitis      No discrete pericolonic abscess is seen      Small hiatal hernia, and other findings as above      Patient was seen by GI after her CT findings and no definitive cause was found for her symptoms  At her last visit, the plan was to repeat CT scan and then review with our surgeons but patient did not get it done, states she cannot tolerate any contrast  She reports the pain is still intermittent  No vomiting or trouble moving bowels  Will refer her back to GI for pain, may need colonoscopy?     Thyroid nodule   - following with endocrinology    B12 deficiency   - started b12 PO 10 days ago, will take daily and repeat her labs in 2 months         · Continued/Maintain healthy weight loss with good nutrition intakes  · Adequate hydration with at least 64oz  fluid intake  · Follow diet as discussed  · Follow vitamin and mineral recommendations as reviewed with you  · Exercise as tolerated  · Colonoscopy referral made: n/a    · Follow-up in 6 months   We kindly ask that " your arrive 15 minutes before your scheduled appointment time with your provider to allow our staff to room you, get your vital signs and update your chart  · Get lab work done  Please call the office if you need a script  It is recommended to check with your insurance BEFORE getting labs done to make sure they are covered by your policy  · Call our office if you have any problems with abdominal pain especially associated with fever, chills, nausea, vomiting or any other concerns  · All  Post-bariatric surgery patients should be aware that very small quantities of any alcohol can cause impairment and it is very possible not to feel the effect  The effect can be in the system for several hours  It is also a stomach irritant  · It is advised to AVOID alcohol, Nonsteroidal antiinflammatory drugs (NSAIDS) and nicotine of all forms   Any of these can cause stomach irritation/pain  · Discussed the effects of alcohol on a bariatric patient and the increased impairment risk  · Keep up the good work!      Postsurgical Malabsorption   -At risk for malabsorption of vitamins/minerals secondary to malabsorption and restriction of intake from bariatric surgery  -Currently taking adequate postop bariatric surgery vitamin supplementation  -Last set of bariatric labs completed 12/2022  -Next set of bariatric labs ordered for approximately 2 months  -Patient received education about the importance of adhering to a lifelong supplementation regimen to avoid vitamin/mineral deficiencies      Diagnoses and all orders for this visit:    Encounter for surgical aftercare following surgery of digestive system    Bariatric surgery status    Postsurgical malabsorption    Obesity, Class I, BMI 30-34 9    Thyroid nodule    Low vitamin B12 level    Other orders  -     albuterol (PROVENTIL HFA,VENTOLIN HFA) 90 mcg/act inhaler; TWO INHALATIONS 4 TIMES A DAY AS NEEDED COUGH         Subjective:      Patient ID: Chris Santana "is a 25 y o  female     -s/p Vertical Laurey Engels Dr Dow Scheuermann 11/22/2021  Presents to the office today for 18 month postop  Tolerating diet without issues; denies N/V, dysphagia, reflux  Initial: 292 4  Current:192  EWL: (Weight loss is ahead of schedule at this post surgical period )  Dixon: 184  Current BMI is Body mass index is 32 03 kg/m²  · Tolerating a regular diet-yes  · Eating at least 60 grams of protein per day-yes  · Following 30/60 minute rule with liquids-yes  · Drinking at least 64 ounces of fluid per day-yes  · Drinking carbonated beverages-no  · Sufficient exercise-no  · Using NSAIDs regularly-no  · Using nicotine-no  · Using alcohol-no  · Supplements: vitafusion MVI + b12 + calcium (not consistent)     · EWL is 70%, which places the patient ahead of schedule for expected post surgical weight loss at this time  The following portions of the patient's history were reviewed and updated as appropriate: allergies, current medications, past family history, past medical history, past social history, past surgical history and problem list     Review of Systems   Constitutional: Positive for fatigue  Negative for chills and fever  Respiratory: Negative  Cardiovascular: Negative  Gastrointestinal: Negative  Neurological: Negative  Psychiatric/Behavioral: Negative  Objective:    /76   Pulse 74   Temp (!) 97 2 °F (36 2 °C) (Tympanic)   Ht 5' 5\" (1 651 m)   Wt 87 3 kg (192 lb 8 oz)   BMI 32 03 kg/m²      Physical Exam  Vitals and nursing note reviewed  Constitutional:       Appearance: Normal appearance  She is obese  HENT:      Head: Normocephalic and atraumatic  Eyes:      Extraocular Movements: Extraocular movements intact  Pupils: Pupils are equal, round, and reactive to light  Cardiovascular:      Rate and Rhythm: Normal rate and regular rhythm     Pulmonary:      Effort: Pulmonary effort is normal       Breath sounds: Normal breath " sounds  Abdominal:      General: Bowel sounds are normal       Tenderness: There is no abdominal tenderness  Musculoskeletal:         General: Normal range of motion  Cervical back: Normal range of motion  Skin:     General: Skin is warm and dry  Neurological:      General: No focal deficit present  Mental Status: She is alert and oriented to person, place, and time     Psychiatric:         Mood and Affect: Mood normal

## 2023-07-11 ENCOUNTER — TELEPHONE (OUTPATIENT)
Dept: BARIATRICS | Facility: CLINIC | Age: 28
End: 2023-07-11

## 2023-07-20 PROBLEM — I10 PRIMARY HYPERTENSION: Status: ACTIVE | Noted: 2023-07-20

## 2023-07-20 PROBLEM — Z01.419 ENCOUNTER FOR ANNUAL ROUTINE GYNECOLOGICAL EXAMINATION: Status: ACTIVE | Noted: 2023-07-20

## 2023-07-20 NOTE — PROGRESS NOTES
Assessment/Plan:  NGE  Urinary frequency -hematuria on dipstick, for culture  PCOS - previous pt of Katheryn Flores. Irregular menses - Junel  BMI 34 lost 73 since 11/21 Sleeve Gastrectomy   Hirsutism - Laser treatment #8 so far and spironolactone  HTN - noncompliance with medication.                 Pap smear q 5yrs. '25  Cervical Cultures till 23 - STD testing '22                                                                         RTO 1 yr.                                                                                    SBA monthly                                                                              Exercise 3/ wk                                                                                        Calcium 1,000 mg/d with Vit D                     Depression Screen: LANG                            Diagnoses and all orders for this visit:    Encounter for annual routine gynecological examination    Menorrhagia with irregular cycle    Polycystic ovarian syndrome    Primary hypertension    Frequency of urination  -     Cancel: Urine culture; Future  -     Urine culture              Subjective:        Patient ID: Aurora Alvarez is a 25 y.o. female. Jarod Pitt presents for a yearly evaluation. When I last saw her she was attempting to conceive and her marriage was ending. She had recent bariatric surgery and was experiencing irregular periods due to PCOS. She was following Dr. Sugar Beck for infertility. She has been  since February 2022. Dr. Hien Ferreira who performs her laser surgery recommended both oral contraceptives and spironolactone to help with her PCOS related irregular menses. These were prescribed by her PCP. For the past 6 weeks she has noted urinary frequency but little volume of urine. There is no dysuria, fever or back pain. She is lost an additional 13 pounds since I saw her. She notes fatigue which is attributed to vitamin deficiencies from her surgery.       The following portions of the patient's history were reviewed and updated as appropriate: She  has a past medical history of GERD (gastroesophageal reflux disease), HL (hearing loss), PCOS (polycystic ovarian syndrome), Tinnitus, and Wears glasses. Patient Active Problem List    Diagnosis Date Noted   • Encounter for annual routine gynecological examination 07/20/2023   • Primary hypertension 07/20/2023   • Women's annual routine gynecological examination 06/29/2022   • Menorrhagia with irregular cycle 06/29/2022   • GERD (gastroesophageal reflux disease)    • Thyroid nodule 03/06/2021   • Pain in both lower extremities 03/04/2021   • Leg swelling 03/04/2021   • Morbid obesity with BMI of 40.0-44.9, adult ([address]) 02/17/2021   • HTN (hypertension) 10/08/2020   • ASCENCION (obstructive sleep apnea)    • Mixed hyperlipidemia 05/01/2017   • Polycystic ovarian syndrome 04/17/2014   PMH:  Menarche 6  Amenorrhea 14  PCOS 15 [cysts on TV US 18]  Cycled with OC's 16  HTN 21 '14  Back- DDD, L3-5 '16  Morbid Obesity BMI 49.35  + Lennox's sign bilaterally - annual visit 12/20 - negative Dopplers  EGD 2/21  Laparoscopic sleeve gastrectomy 11/21   Pneumonia 11/21  Hirsutism - 6 laser treatments '22 Dr. Brenda Martínez , spironolactone  Thyroid FNA 5/23 - nl  She  has a past surgical history that includes No past surgeries; US guided thyroid biopsy (10/4/2021); EGD; and pr laps [facility] rstrictiv px longitudinal gastrectomy (N/A, 11/22/2021). Her family history includes Diabetes in her father; Hyperlipidemia in her father; Hypertension in her father; Sarcoidosis in her mother; Thyroid disease in her mother. FH:  F- HTN, DM, Hyperlipidemia, 3 CVA '21 50  She  reports that she quit smoking about 6 years ago. Her smoking use included cigarettes. She has never used smokeless tobacco. She reports that she does not currently use alcohol. She reports that she does not use drugs. SH:   to Ken, a , '14;  2/22.  [employer], also works for [employer]. Non smoker. Current Outpatient Medications   Medication Sig Dispense Refill   • albuterol (PROVENTIL HFA,VENTOLIN HFA) 90 mcg/act inhaler TWO INHALATIONS 4 TIMES A DAY AS NEEDED COUGH     • Junel FE 1/20 1-20 MG-MCG per tablet      • montelukast (SINGULAIR) 10 mg tablet Take 10 mg by mouth daily As needed     • Multiple Vitamin (MULTI-VITAMINS PO) Take by mouth     • Tranexamic Acid (Lysteda) 650 MG TABS Two po TID x 5 days PRN for heavy menstrual bleeding 30 tablet 3     Current Facility-Administered Medications   Medication Dose Route Frequency Provider Last Rate Last Admin   • cyanocobalamin injection 250 mcg  250 mcg Intramuscular Once per day on Mon Mikayla Ozuna PA-C   250 mcg at 03/24/23 1111     Current Outpatient Medications on File Prior to Visit   Medication Sig   • albuterol (PROVENTIL HFA,VENTOLIN HFA) 90 mcg/act inhaler TWO INHALATIONS 4 TIMES A DAY AS NEEDED COUGH   • Junel FE 1/20 1-20 MG-MCG per tablet    • montelukast (SINGULAIR) 10 mg tablet Take 10 mg by mouth daily As needed   • Multiple Vitamin (MULTI-VITAMINS PO) Take by mouth   • Tranexamic Acid (Lysteda) 650 MG TABS Two po TID x 5 days PRN for heavy menstrual bleeding   • [DISCONTINUED] norethindrone-ethinyl estradiol (Loestrin Fe 1/20) 1-20 MG-MCG per tablet Take 1 tablet by mouth daily   • [DISCONTINUED] ergocalciferol (VITAMIN D2) 50,000 units Take 1 capsule (50,000 Units total) by mouth 2 (two) times a week with meals     Current Facility-Administered Medications on File Prior to Visit   Medication   • cyanocobalamin injection 250 mcg     She has No Known Allergies. .    Review of Systems   Constitutional: Negative for activity change, appetite change, fatigue and unexpected weight change. Eyes: Negative for visual disturbance. Respiratory: Negative for cough, chest tightness, shortness of breath and wheezing.     Cardiovascular: Negative for chest pain, palpitations and leg swelling. Breast: Patient denies tenderness, nipple discharge, masses, or erythema. Gastrointestinal: Negative for abdominal distention, abdominal pain, blood in stool, constipation, diarrhea, nausea and vomiting. Endocrine: Negative for cold intolerance and heat intolerance. Genitourinary: Positive for frequency. Negative for decreased urine volume, difficulty urinating, dysuria, hematuria, menstrual problem, pelvic pain, urgency, vaginal bleeding, vaginal discharge and vaginal pain. Not in a relationship. Musculoskeletal: Negative for arthralgias. Skin: Negative for rash. Neurological: Negative for weakness, light-headedness, numbness and headaches. Hematological: Does not bruise/bleed easily. Psychiatric/Behavioral: Negative for agitation, behavioral problems and sleep disturbance. The patient is not nervous/anxious. Objective:    Vitals:    07/21/23 1313   BP: 126/80   BP Location: Right arm   Patient Position: Sitting   Cuff Size: Standard   Weight: 88.8 kg (195 lb 12.8 oz)   Height: 5' 4" (1.626 m)            Physical Exam  Vitals and nursing note reviewed. Constitutional:       General: She is not in acute distress. Appearance: She is well-developed. HENT:      Head: Normocephalic and atraumatic. Eyes:      General: No scleral icterus. Right eye: No discharge. Left eye: No discharge. Extraocular Movements: Extraocular movements intact. Conjunctiva/sclera: Conjunctivae normal.   Neck:      Thyroid: No thyromegaly. Trachea: No tracheal deviation. Cardiovascular:      Rate and Rhythm: Normal rate and regular rhythm. Heart sounds: Normal heart sounds. No murmur heard. Pulmonary:      Effort: Pulmonary effort is normal. No respiratory distress. Breath sounds: Normal breath sounds. No wheezing. Chest:   Breasts:     Breasts are symmetrical.      Right: No inverted nipple, mass, nipple discharge, skin change or tenderness. Left: No inverted nipple, mass, nipple discharge, skin change or tenderness. Abdominal:      General: Bowel sounds are normal. There is no distension. Palpations: Abdomen is soft. There is no mass. Tenderness: There is no abdominal tenderness. There is no guarding or rebound. Genitourinary:     General: Normal vulva. Labia:         Right: No rash, tenderness or lesion. Left: No rash, tenderness or lesion. Vagina: Normal.      Cervix: No cervical motion tenderness or discharge. Uterus: Not deviated, not enlarged and not tender. Adnexa:         Right: No mass, tenderness or fullness. Left: No mass, tenderness or fullness. Rectum: No external hemorrhoid. Comments: Urethral meatus within normal limits. Perineum within normal limits. Bladder well supported. Musculoskeletal:         General: No tenderness. Normal range of motion. Cervical back: Normal range of motion and neck supple. Lymphadenopathy:      Cervical: No cervical adenopathy. Skin:     General: Skin is warm and dry. Neurological:      Mental Status: She is alert and oriented to person, place, and time. Psychiatric:         Mood and Affect: Mood normal.         Behavior: Behavior normal.         Thought Content:  Thought content normal.         Judgment: Judgment normal.

## 2023-07-21 ENCOUNTER — ANNUAL EXAM (OUTPATIENT)
Dept: OBGYN CLINIC | Facility: CLINIC | Age: 28
End: 2023-07-21
Payer: COMMERCIAL

## 2023-07-21 VITALS
SYSTOLIC BLOOD PRESSURE: 126 MMHG | DIASTOLIC BLOOD PRESSURE: 80 MMHG | HEIGHT: 64 IN | WEIGHT: 195.8 LBS | BODY MASS INDEX: 33.43 KG/M2

## 2023-07-21 DIAGNOSIS — Z01.419 ENCOUNTER FOR ANNUAL ROUTINE GYNECOLOGICAL EXAMINATION: Primary | ICD-10-CM

## 2023-07-21 DIAGNOSIS — N92.1 MENORRHAGIA WITH IRREGULAR CYCLE: ICD-10-CM

## 2023-07-21 DIAGNOSIS — I10 PRIMARY HYPERTENSION: ICD-10-CM

## 2023-07-21 DIAGNOSIS — E28.2 POLYCYSTIC OVARIAN SYNDROME: ICD-10-CM

## 2023-07-21 DIAGNOSIS — R35.0 FREQUENCY OF URINATION: ICD-10-CM

## 2023-07-21 PROCEDURE — 87086 URINE CULTURE/COLONY COUNT: CPT | Performed by: OBSTETRICS & GYNECOLOGY

## 2023-07-21 PROCEDURE — S0612 ANNUAL GYNECOLOGICAL EXAMINA: HCPCS | Performed by: OBSTETRICS & GYNECOLOGY

## 2023-07-22 LAB — BACTERIA UR CULT: NORMAL

## 2023-08-30 ENCOUNTER — APPOINTMENT (OUTPATIENT)
Dept: LAB | Facility: CLINIC | Age: 28
End: 2023-08-30
Payer: COMMERCIAL

## 2023-08-30 DIAGNOSIS — Z48.815 ENCOUNTER FOR SURGICAL AFTERCARE FOLLOWING SURGERY OF DIGESTIVE SYSTEM: ICD-10-CM

## 2023-08-30 DIAGNOSIS — Z48.815 ENCOUNTER FOR SURGICAL AFTERCARE FOLLOWING SURGERY OF DIGESTIVE SYSTEM: Primary | ICD-10-CM

## 2023-08-30 DIAGNOSIS — K91.2 POSTSURGICAL MALABSORPTION: ICD-10-CM

## 2023-08-30 DIAGNOSIS — Z98.84 BARIATRIC SURGERY STATUS: ICD-10-CM

## 2023-08-30 LAB
25(OH)D3 SERPL-MCNC: 20.9 NG/ML (ref 30–100)
ALBUMIN SERPL BCP-MCNC: 4.2 G/DL (ref 3.5–5)
ALP SERPL-CCNC: 59 U/L (ref 34–104)
ALT SERPL W P-5'-P-CCNC: 15 U/L (ref 7–52)
ANION GAP SERPL CALCULATED.3IONS-SCNC: 9 MMOL/L
AST SERPL W P-5'-P-CCNC: 14 U/L (ref 13–39)
BILIRUB SERPL-MCNC: 0.63 MG/DL (ref 0.2–1)
BUN SERPL-MCNC: 14 MG/DL (ref 5–25)
CALCIUM SERPL-MCNC: 9.3 MG/DL (ref 8.4–10.2)
CHLORIDE SERPL-SCNC: 103 MMOL/L (ref 96–108)
CO2 SERPL-SCNC: 26 MMOL/L (ref 21–32)
CREAT SERPL-MCNC: 0.75 MG/DL (ref 0.6–1.3)
ERYTHROCYTE [DISTWIDTH] IN BLOOD BY AUTOMATED COUNT: 12.4 % (ref 11.6–15.1)
FERRITIN SERPL-MCNC: 30 NG/ML (ref 11–307)
FOLATE SERPL-MCNC: 9.9 NG/ML
GFR SERPL CREATININE-BSD FRML MDRD: 107 ML/MIN/1.73SQ M
GLUCOSE P FAST SERPL-MCNC: 81 MG/DL (ref 65–99)
HCT VFR BLD AUTO: 42.6 % (ref 34.8–46.1)
HGB BLD-MCNC: 14.2 G/DL (ref 11.5–15.4)
IRON SATN MFR SERPL: 25 % (ref 15–50)
IRON SERPL-MCNC: 84 UG/DL (ref 50–212)
MCH RBC QN AUTO: 28.7 PG (ref 26.8–34.3)
MCHC RBC AUTO-ENTMCNC: 33.3 G/DL (ref 31.4–37.4)
MCV RBC AUTO: 86 FL (ref 82–98)
PLATELET # BLD AUTO: 223 THOUSANDS/UL (ref 149–390)
PMV BLD AUTO: 11.2 FL (ref 8.9–12.7)
POTASSIUM SERPL-SCNC: 4.1 MMOL/L (ref 3.5–5.3)
PROT SERPL-MCNC: 7.2 G/DL (ref 6.4–8.4)
PTH-INTACT SERPL-MCNC: 35.4 PG/ML (ref 12–88)
RBC # BLD AUTO: 4.95 MILLION/UL (ref 3.81–5.12)
SODIUM SERPL-SCNC: 138 MMOL/L (ref 135–147)
TIBC SERPL-MCNC: 341 UG/DL (ref 250–450)
UIBC SERPL-MCNC: 257 UG/DL (ref 155–355)
VIT B12 SERPL-MCNC: 301 PG/ML (ref 180–914)
WBC # BLD AUTO: 6.05 THOUSAND/UL (ref 4.31–10.16)

## 2023-08-30 PROCEDURE — 83540 ASSAY OF IRON: CPT

## 2023-08-30 PROCEDURE — 82746 ASSAY OF FOLIC ACID SERUM: CPT

## 2023-08-30 PROCEDURE — 84630 ASSAY OF ZINC: CPT

## 2023-08-30 PROCEDURE — 84590 ASSAY OF VITAMIN A: CPT

## 2023-08-30 PROCEDURE — 82607 VITAMIN B-12: CPT

## 2023-08-30 PROCEDURE — 84425 ASSAY OF VITAMIN B-1: CPT

## 2023-08-30 PROCEDURE — 80053 COMPREHEN METABOLIC PANEL: CPT

## 2023-08-30 PROCEDURE — 82728 ASSAY OF FERRITIN: CPT

## 2023-08-30 PROCEDURE — 83550 IRON BINDING TEST: CPT

## 2023-08-30 PROCEDURE — 36415 COLL VENOUS BLD VENIPUNCTURE: CPT

## 2023-08-30 PROCEDURE — 82306 VITAMIN D 25 HYDROXY: CPT

## 2023-08-30 PROCEDURE — 85027 COMPLETE CBC AUTOMATED: CPT

## 2023-08-30 PROCEDURE — 83970 ASSAY OF PARATHORMONE: CPT

## 2023-09-03 LAB — VIT B1 BLD-SCNC: 147.1 NMOL/L (ref 66.5–200)

## 2023-09-05 LAB — VIT A SERPL-MCNC: 35.7 UG/DL (ref 18.9–57.3)

## 2023-09-06 LAB — ZINC SERPL-MCNC: 78 UG/DL (ref 44–115)

## 2023-09-09 ENCOUNTER — HOSPITAL ENCOUNTER (EMERGENCY)
Facility: HOSPITAL | Age: 28
Discharge: HOME | End: 2023-09-09
Attending: EMERGENCY MEDICINE
Payer: COMMERCIAL

## 2023-09-09 ENCOUNTER — APPOINTMENT (EMERGENCY)
Dept: RADIOLOGY | Facility: HOSPITAL | Age: 28
End: 2023-09-09
Payer: COMMERCIAL

## 2023-09-09 VITALS
RESPIRATION RATE: 19 BRPM | OXYGEN SATURATION: 98 % | DIASTOLIC BLOOD PRESSURE: 67 MMHG | HEART RATE: 58 BPM | SYSTOLIC BLOOD PRESSURE: 111 MMHG | TEMPERATURE: 97.6 F

## 2023-09-09 DIAGNOSIS — R51.9 ACUTE HEADACHE: Primary | ICD-10-CM

## 2023-09-09 LAB
ANION GAP SERPL CALCULATED.3IONS-SCNC: 6 MMOL/L
BACTERIA UR QL AUTO: ABNORMAL /HPF
BILIRUB UR QL STRIP: NEGATIVE
BUN SERPL-MCNC: 14 MG/DL (ref 5–25)
CALCIUM SERPL-MCNC: 8.8 MG/DL (ref 8.4–10.2)
CHLORIDE SERPL-SCNC: 106 MMOL/L (ref 96–108)
CLARITY UR: CLEAR
CO2 SERPL-SCNC: 24 MMOL/L (ref 21–32)
COLOR UR: YELLOW
CREAT SERPL-MCNC: 0.71 MG/DL (ref 0.6–1.3)
EXT PREGNANCY TEST URINE: NEGATIVE
EXT. CONTROL: NORMAL
GFR SERPL CREATININE-BSD FRML MDRD: 114 ML/MIN/1.73SQ M
GLUCOSE SERPL-MCNC: 71 MG/DL (ref 65–140)
GLUCOSE UR STRIP-MCNC: NEGATIVE MG/DL
HGB UR QL STRIP.AUTO: NEGATIVE
KETONES UR STRIP-MCNC: NEGATIVE MG/DL
LEUKOCYTE ESTERASE UR QL STRIP: NEGATIVE
MUCOUS THREADS UR QL AUTO: ABNORMAL
NITRITE UR QL STRIP: NEGATIVE
NON-SQ EPI CELLS URNS QL MICRO: ABNORMAL /HPF
PH UR STRIP.AUTO: 6 [PH]
POTASSIUM SERPL-SCNC: 4.1 MMOL/L (ref 3.5–5.3)
PROT UR STRIP-MCNC: ABNORMAL MG/DL
RBC #/AREA URNS AUTO: ABNORMAL /HPF
SODIUM SERPL-SCNC: 136 MMOL/L (ref 135–147)
SP GR UR STRIP.AUTO: 1.02 (ref 1–1.03)
UROBILINOGEN UR STRIP-ACNC: 2 MG/DL
WBC #/AREA URNS AUTO: ABNORMAL /HPF

## 2023-09-09 PROCEDURE — 96365 THER/PROPH/DIAG IV INF INIT: CPT

## 2023-09-09 PROCEDURE — 93005 ELECTROCARDIOGRAM TRACING: CPT

## 2023-09-09 PROCEDURE — 70496 CT ANGIOGRAPHY HEAD: CPT

## 2023-09-09 PROCEDURE — 70498 CT ANGIOGRAPHY NECK: CPT

## 2023-09-09 PROCEDURE — 96375 TX/PRO/DX INJ NEW DRUG ADDON: CPT

## 2023-09-09 PROCEDURE — 99284 EMERGENCY DEPT VISIT MOD MDM: CPT

## 2023-09-09 PROCEDURE — 80048 BASIC METABOLIC PNL TOTAL CA: CPT

## 2023-09-09 PROCEDURE — 81001 URINALYSIS AUTO W/SCOPE: CPT

## 2023-09-09 PROCEDURE — G1004 CDSM NDSC: HCPCS

## 2023-09-09 PROCEDURE — 81025 URINE PREGNANCY TEST: CPT

## 2023-09-09 PROCEDURE — 36415 COLL VENOUS BLD VENIPUNCTURE: CPT

## 2023-09-09 PROCEDURE — 96361 HYDRATE IV INFUSION ADD-ON: CPT

## 2023-09-09 PROCEDURE — 99285 EMERGENCY DEPT VISIT HI MDM: CPT | Performed by: EMERGENCY MEDICINE

## 2023-09-09 RX ORDER — MAGNESIUM SULFATE HEPTAHYDRATE 40 MG/ML
2 INJECTION, SOLUTION INTRAVENOUS ONCE
Status: COMPLETED | OUTPATIENT
Start: 2023-09-09 | End: 2023-09-09

## 2023-09-09 RX ORDER — METOCLOPRAMIDE HYDROCHLORIDE 5 MG/ML
10 INJECTION INTRAMUSCULAR; INTRAVENOUS ONCE
Status: COMPLETED | OUTPATIENT
Start: 2023-09-09 | End: 2023-09-09

## 2023-09-09 RX ORDER — DEXAMETHASONE SODIUM PHOSPHATE 10 MG/ML
10 INJECTION, SOLUTION INTRAMUSCULAR; INTRAVENOUS ONCE
Status: COMPLETED | OUTPATIENT
Start: 2023-09-09 | End: 2023-09-09

## 2023-09-09 RX ORDER — DIPHENHYDRAMINE HYDROCHLORIDE 50 MG/ML
25 INJECTION INTRAMUSCULAR; INTRAVENOUS ONCE
Status: COMPLETED | OUTPATIENT
Start: 2023-09-09 | End: 2023-09-09

## 2023-09-09 RX ORDER — ACETAMINOPHEN 325 MG/1
650 TABLET ORAL ONCE
Status: COMPLETED | OUTPATIENT
Start: 2023-09-09 | End: 2023-09-09

## 2023-09-09 RX ORDER — KETOROLAC TROMETHAMINE 30 MG/ML
15 INJECTION, SOLUTION INTRAMUSCULAR; INTRAVENOUS ONCE
Status: COMPLETED | OUTPATIENT
Start: 2023-09-09 | End: 2023-09-09

## 2023-09-09 RX ADMIN — DEXAMETHASONE SODIUM PHOSPHATE 10 MG: 10 INJECTION, SOLUTION INTRAMUSCULAR; INTRAVENOUS at 21:20

## 2023-09-09 RX ADMIN — KETOROLAC TROMETHAMINE 15 MG: 30 INJECTION, SOLUTION INTRAMUSCULAR; INTRAVENOUS at 21:20

## 2023-09-09 RX ADMIN — ACETAMINOPHEN 650 MG: 325 TABLET, FILM COATED ORAL at 14:27

## 2023-09-09 RX ADMIN — IOHEXOL 85 ML: 350 INJECTION, SOLUTION INTRAVENOUS at 18:49

## 2023-09-09 RX ADMIN — DIPHENHYDRAMINE HYDROCHLORIDE 25 MG: 50 INJECTION, SOLUTION INTRAMUSCULAR; INTRAVENOUS at 17:26

## 2023-09-09 RX ADMIN — METOCLOPRAMIDE HYDROCHLORIDE 10 MG: 5 INJECTION INTRAMUSCULAR; INTRAVENOUS at 17:26

## 2023-09-09 RX ADMIN — MAGNESIUM SULFATE HEPTAHYDRATE 2 G: 40 INJECTION, SOLUTION INTRAVENOUS at 21:20

## 2023-09-09 RX ADMIN — SODIUM CHLORIDE 1000 ML: 0.9 INJECTION, SOLUTION INTRAVENOUS at 17:26

## 2023-09-09 NOTE — ED ATTENDING ATTESTATION
9/9/2023  I, Leonila Abraham MD, saw and evaluated the patient. I have discussed the patient with the resident/non-physician practitioner and agree with the resident's/non-physician practitioner's findings, Plan of Care, and MDM as documented in the resident's/non-physician practitioner's note, except where noted. All available labs and Radiology studies were reviewed. I was present for key portions of any procedure(s) performed by the resident/non-physician practitioner and I was immediately available to provide assistance. At this point I agree with the current assessment done in the Emergency Department. I have conducted an independent evaluation of this patient a history and physical is as follows:    ED Course         Critical Care Time  Procedures    26 yo female with headache for 4 days, pressure like posterior on left side with photophobia and lightheadedness. No hx of migraines. Pt having some neck pain. No fever, no n/v/d, no chills, no cp, no sob, no weakness, numbness, tingling. Pt feels left ear has decreased hearing. Pt taking tylenol with no relief. Pt with family hx of aneurysm. Vss, afebrile, lungs cta, rrr, abdomen soft nontender, no neuro deficits. Likely migraine like headache, pain meds, due to family hx will obtain cta head and neck. eye swelling

## 2023-09-09 NOTE — ED PROVIDER NOTES
History  Chief Complaint   Patient presents with   • Dizziness     Pt started to have posterior or left sided head pain, neck pain and dizziness(room spinning) 4-5 days ago. Today she states she is having a hard time hearing out of her left. Slight tingling by her left neck and ear. No recent illness/trauma. Slight difficulty focusing due to her head pain. No numbness or tingling in extremities. No facial droop. She is concerned because her father has a hx of aneurysms. 78-year-old female with no significant past medical history presents to ED for chief complaint of 5 days of posterior headache. Patient states the headache has been constant, is described as "pressure ". She also notes associated photophobia, lightheadedness, "decreased hearing "in the left ear, and pain in the muscles of the neck. She has taken Tylenol without relief of the headache. Patient also reports "trouble with focusing" this week. Denies nausea, vomiting, trouble with speech, weakness, numbness. Patient is concerned because she states she has a family history of brain aneurysm. Patient also complains that her urine "smelled funny "this week. Denies burning or pain on urination or hematuria. Prior to Admission Medications   Prescriptions Last Dose Informant Patient Reported? Taking?    Junel FE 1/20 1-20 MG-MCG per tablet  Self Yes No   Multiple Vitamin (MULTI-VITAMINS PO)  Self Yes No   Sig: Take by mouth   Tranexamic Acid (Lysteda) 650 MG TABS  Self No No   Sig: Two po TID x 5 days PRN for heavy menstrual bleeding   albuterol (PROVENTIL HFA,VENTOLIN HFA) 90 mcg/act inhaler  Self Yes No   Sig: TWO INHALATIONS 4 TIMES A DAY AS NEEDED COUGH   montelukast (SINGULAIR) 10 mg tablet  Self Yes No   Sig: Take 10 mg by mouth daily As needed      Facility-Administered Medications Last Administration Doses Remaining   cyanocobalamin injection 250 mcg 3/24/2023 11:11 AM           Past Medical History:   Diagnosis Date   • GERD (gastroesophageal reflux disease)    • HL (hearing loss)    • PCOS (polycystic ovarian syndrome)    • Tinnitus    • Wears glasses        Past Surgical History:   Procedure Laterality Date   • EGD     • NO PAST SURGERIES     • ME LAPS GSTRC RSTRICTIV PX LONGITUDINAL GASTRECTOMY N/A 2021    Procedure: LAPAROSCOPIC SLEEVE GASTRECTOMY WITH ROBOTICS;  Surgeon: Scott Ferguson MD;  Location: [facility] OR;  Service: Bariatrics   • US GUIDED THYROID BIOPSY  10/4/2021   • US GUIDED THYROID BIOPSY  2022   • US GUIDED THYROID BIOPSY  2023       Family History   Problem Relation Age of Onset   • Thyroid disease Mother    • Sarcoidosis Mother    • Hypertension Father    • Diabetes Father    • Hyperlipidemia Father    • Cancer Neg Hx    • Stroke Neg Hx      I have reviewed and agree with the history as documented. E-Cigarette/Vaping   • E-Cigarette Use Never User      E-Cigarette/Vaping Substances   • Nicotine No    • THC No    • CBD No    • Flavoring No    • Other No    • Unknown No      Social History     Tobacco Use   • Smoking status: Former     Types: Cigarettes     Quit date:      Years since quittin.6   • Smokeless tobacco: Never   • Tobacco comments:     patient admits to hx of 1 or 2 cigarettes a year. Used hookah   Vaping Use   • Vaping Use: Never used   Substance Use Topics   • Alcohol use: Not Currently   • Drug use: No        Review of Systems   Constitutional: Negative for chills and fever. HENT: Negative for congestion, ear pain, rhinorrhea and sore throat. Eyes: Positive for photophobia. Negative for pain and visual disturbance. Respiratory: Negative for cough and shortness of breath. Cardiovascular: Negative for chest pain and palpitations. Gastrointestinal: Negative for abdominal pain, nausea and vomiting. Genitourinary: Negative for difficulty urinating, dysuria, flank pain and hematuria. Musculoskeletal: Positive for neck pain (Left neck).  Negative for arthralgias and back pain. Skin: Negative for color change and rash. Neurological: Positive for light-headedness and headaches (Posterior). Negative for dizziness, seizures, syncope, facial asymmetry, weakness and numbness. Psychiatric/Behavioral: Negative for agitation and behavioral problems. All other systems reviewed and are negative. Physical Exam  ED Triage Vitals   Temperature Pulse Respirations Blood Pressure SpO2   09/09/23 1320 09/09/23 1320 09/09/23 1320 09/09/23 1320 09/09/23 1320   97.8 °F (36.6 °C) 83 18 131/84 99 %      Temp Source Heart Rate Source Patient Position - Orthostatic VS BP Location FiO2 (%)   09/09/23 1320 09/09/23 1320 09/09/23 1326 09/09/23 1326 --   Oral Monitor Lying Right arm       Pain Score       09/09/23 1320       8             Orthostatic Vital Signs  Vitals:    09/09/23 1320 09/09/23 1326 09/09/23 1545 09/09/23 1700   BP: 131/84 131/84 120/76 111/67   Pulse: 83 83 68 58   Patient Position - Orthostatic VS:  Lying Lying Lying       Physical Exam  Vitals and nursing note reviewed. Constitutional:       General: She is not in acute distress. Appearance: Normal appearance. She is normal weight. She is not toxic-appearing. HENT:      Head: Normocephalic and atraumatic. Comments: Mild tenderness palpation of the posterior occiput. No swelling     Right Ear: Tympanic membrane, ear canal and external ear normal.      Left Ear: Tympanic membrane, ear canal and external ear normal.      Nose: No rhinorrhea. Mouth/Throat:      Mouth: Mucous membranes are moist.      Pharynx: Oropharynx is clear. Eyes:      Extraocular Movements: Extraocular movements intact. Conjunctiva/sclera: Conjunctivae normal.      Pupils: Pupils are equal, round, and reactive to light. Neck:      Comments: No midline cervical tenderness. Mild tenderness of the b/l trapezius with associated muscle hypertonicity  Cardiovascular:      Rate and Rhythm: Normal rate and regular rhythm. Pulses: Normal pulses. Heart sounds: Normal heart sounds. Pulmonary:      Effort: Pulmonary effort is normal.      Breath sounds: Normal breath sounds. Abdominal:      General: There is no distension. Palpations: Abdomen is soft. Tenderness: There is no abdominal tenderness. There is no right CVA tenderness or left CVA tenderness. Musculoskeletal:         General: No swelling, tenderness or deformity. Cervical back: Neck supple. No rigidity. Right lower leg: No edema. Left lower leg: No edema. Skin:     General: Skin is warm and dry. Capillary Refill: Capillary refill takes less than 2 seconds. Neurological:      General: No focal deficit present. Mental Status: She is alert and oriented to person, place, and time. Cranial Nerves: No cranial nerve deficit. Sensory: No sensory deficit. Motor: No weakness.       Comments: 5 out of 5 strength in all extremities   Psychiatric:         Mood and Affect: Mood normal.         Behavior: Behavior normal.         ED Medications  Medications   acetaminophen (TYLENOL) tablet 650 mg (650 mg Oral Given 9/9/23 1427)   metoclopramide (REGLAN) injection 10 mg (10 mg Intravenous Given 9/9/23 1726)   diphenhydrAMINE (BENADRYL) injection 25 mg (25 mg Intravenous Given 9/9/23 1726)   sodium chloride 0.9 % bolus 1,000 mL (0 mL Intravenous Stopped 9/9/23 2100)   iohexol (OMNIPAQUE) 350 MG/ML injection (MULTI-DOSE) 85 mL (85 mL Intravenous Given 9/9/23 1849)   dexamethasone (PF) (DECADRON) injection 10 mg (10 mg Intravenous Given 9/9/23 2120)   ketorolac (TORADOL) injection 15 mg (15 mg Intravenous Given 9/9/23 2120)   magnesium sulfate 2 g/50 mL IVPB (premix) 2 g (0 g Intravenous Stopped 9/9/23 2223)       Diagnostic Studies  Results Reviewed     Procedure Component Value Units Date/Time    Urine Microscopic [[id number]]  (Abnormal) Collected: 09/09/23 1829    Lab Status: Final result Specimen: Urine, Clean Catch Updated: 09/09/23 1853     RBC, UA None Seen /hpf      WBC, UA None Seen /hpf      Epithelial Cells Occasional /hpf      Bacteria, UA None Seen /hpf      MUCUS THREADS Innumerable    UA w Reflex to Microscopic w Reflex to Culture [[id number]]  (Abnormal) Collected: 09/09/23 1829    Lab Status: Final result Specimen: Urine, Clean Catch Updated: 09/09/23 1849     Color, UA Yellow     Clarity, UA Clear     Specific Gravity, UA 1.023     pH, UA 6.0     Leukocytes, UA Negative     Nitrite, UA Negative     Protein, UA Trace mg/dl      Glucose, UA Negative mg/dl      Ketones, UA Negative mg/dl      Urobilinogen, UA 2.0 mg/dl      Bilirubin, UA Negative     Occult Blood, UA Negative    POCT pregnancy, urine [[id number]]  (Normal) Resulted: 09/09/23 1831    Lab Status: Final result Updated: 09/09/23 1831     EXT Preg Test, Ur Negative     Control Valid    Basic metabolic panel [[id number]] Collected: 09/09/23 1730    Lab Status: Final result Specimen: Blood from Arm, Left Updated: 09/09/23 1758     Sodium 136 mmol/L      Potassium 4.1 mmol/L      Chloride 106 mmol/L      CO2 24 mmol/L      ANION GAP 6 mmol/L      BUN 14 mg/dL      Creatinine 0.71 mg/dL      Glucose 71 mg/dL      Calcium 8.8 mg/dL      eGFR 114 ml/min/1.73sq m     Narrative:      [facility] guidelines for Chronic Kidney Disease (CKD):   •  Stage 1 with normal or high GFR (GFR > 90 mL/min/1.73 square meters)  •  Stage 2 Mild CKD (GFR = 60-89 mL/min/1.73 square meters)  •  Stage 3A Moderate CKD (GFR = 45-59 mL/min/1.73 square meters)  •  Stage 3B Moderate CKD (GFR = 30-44 mL/min/1.73 square meters)  •  Stage 4 Severe CKD (GFR = 15-29 mL/min/1.73 square meters)  •  Stage 5 End Stage CKD (GFR <15 mL/min/1.73 square meters)  Note: GFR calculation is accurate only with a steady state creatinine                 CTA head and neck with and without contrast   Final Result by Juan Miguel Page MD (09/09 2015)      No acute intracranial abnormality Unremarkable CT angiogram of the head and neck. No evidence of stenosis, occlusion, dissection, or aneurysm. Workstation performed: [id number]               Procedures  Procedures      ED Course                                       Medical Decision Making  Praneeth Lam is a 25 y.o. female who presents to ED for evaluation of posterior headache x4 days with associated photophobia    Physical exam: Vitals stable. Patient in no acute distress. 5-5 strength in all extremities. Cranial nerves II through XII intact. No facial droop, no dysarthria. Differential diagnoses include: Migraine, UTI. I doubt cerebral aneurysm    Workup includes: BMP, CTA head and neck, urinalysis, urine pregnancy, NS bolus, reglan, benadryl, tylenol    - Patient is suffering from a headache. It sounds like benign headache/typical migraine.  - The headache is not only when the patient wakes up. - The patient has been having a headache that is gradual in onset, no fevers, no neck stiffness. The patient is an alert patient, older than age 13 years with severe non-traumatic headache reaching maximum intensity over greater than an hour. The patient has no new neurologic deficits, previous aneurysms, SAH, brain tumors. - Further, there are no high-risk variables including neck pain/stiffness, witnessed LOC, onset during exertion, thunderclap headache quality (instantly peaking pain), nor is there limited neck flexion on examination.   - Clinically, doesn't sound like a secondary headache. - Will trial a migraine cocktail:   Pain:        (x) Tylenol 975mg PO        () Toradol 15mg IV   Antiemetic: will do IV as there might be stasis of gastric contents.          (x) Reglan 10mg IV        (  ) Phenergan        (  ) Zofran 4mg IV                   (  ) Compazine   IVF: as mild dehydration decreases pain thresholds and increases central pain-related activity in the anterior cingulate cortex, insula, and thalamus and is a known trigger of migraines. (x) Normal Saline        (  ) Lactated Ringers        (  ) Plasmalyte   Other:        (x) Benadryl 50mg IV        (  ) Versed 3mg IV        (  ) Decadron 10mg IV        (  ) Solumedrol 500mg IV         (  ) Magnesium 2g IV over 30-60 minutes        (  ) Depacon / Depakote 500mg IV over 60 minutes        (  ) Propofol        (  ) Ketamine 1mg/kg IV        (  ) Olanzapine 10mg PO sublingual        (  ) Asenapine 10mg PO SL        (  ) Haldol 5mg IM  - Will likely DC home with preventive medications:        (x) Magnesium oxide 400mg PO Qday x30 days        (x) Riboflavin 400mg PO Qday x30 days        (x) Tylenol 650mg Q4-6H PO        (x) Motrin 400mg Q6H PO  - Return precautions reviewed orally for concerning red flags  - Patient to follow-up with PCP or return for worsening.   - 13 point ROS was performed and all are normal unless stated in the history above. - Nursing note reviewed. Vitals reviewed. - Orders placed by myself and/or advanced practitioner / resident.    - Previous chart was reviewed  - No language barrier.   - History obtained from patient. - There are no limitations to the history obtained. - Critical care time: Not applicable for this patient. Will follow up and continue to monitor. Please see ED Course for additional information. Amount and/or Complexity of Data Reviewed  Labs: ordered. Radiology: ordered. Risk  OTC drugs. Prescription drug management. Disposition  Final diagnoses:   Acute headache     Time reflects when diagnosis was documented in both MDM as applicable and the Disposition within this note     Time User Action Codes Description Comment    9/9/2023 10:14 PM Rodolfo Lung Add [R51.9] Acute headache       ED Disposition     ED Disposition   Discharge    Condition   Stable    Date/Time   Sat Sep 9, 2023 10:14 PM    Comment   Any Posada discharge to home/self care.                Follow-up Information     Follow up With Specialties Details Why Contact Info Additional [id number] [facility], DO Internal Medicine   [address] [place] [id number]  [phone] [address] [place] [address] Emergency Department Emergency Medicine Go to  If symptoms worsen [address] [id number]  [facility] Emergency Department, [address], [place], [facility], [facility]          Discharge Medication List as of 9/9/2023 10:15 PM      CONTINUE these medications which have NOT CHANGED    Details   albuterol (PROVENTIL HFA,VENTOLIN HFA) 90 mcg/act inhaler TWO INHALATIONS 4 TIMES A DAY AS NEEDED COUGH, Historical Med      Junel FE 1/20 1-20 MG-MCG per tablet Starting Sat 1/8/2022, Historical Med      montelukast (SINGULAIR) 10 mg tablet Take 10 mg by mouth daily As needed, Starting Tue 5/17/2022, Historical Med      Multiple Vitamin (MULTI-VITAMINS PO) Take by mouth, Historical Med      Tranexamic Acid (Lysteda) 650 MG TABS Two po TID x 5 days PRN for heavy menstrual bleeding, Normal           No discharge procedures on file. PDMP Review       Value Time User    PDMP Reviewed  Yes 3/21/2022  1:49 PM Aubrey Ott PA-C           ED Provider  Attending physically available and evaluated Angelica Palma. I managed the patient along with the ED Attending.     Electronically Signed by         Johny Oliveira DO  09/10/23 [id number]

## 2023-09-10 LAB
ATRIAL RATE: 88 BPM
P AXIS: 6 DEGREES
PR INTERVAL: 140 MS
QRS AXIS: 40 DEGREES
QRSD INTERVAL: 88 MS
QT INTERVAL: 364 MS
QTC INTERVAL: 440 MS
T WAVE AXIS: 10 DEGREES
VENTRICULAR RATE: 88 BPM

## 2023-09-10 PROCEDURE — 93010 ELECTROCARDIOGRAM REPORT: CPT | Performed by: INTERNAL MEDICINE

## 2023-09-10 NOTE — DISCHARGE INSTRUCTIONS
Lianet Torres was seen and evaluated today in the emergency department over your concern of headache. The workup that we performed showed acute headache. Please return to the emergency department if you experience worsening of the headache, difficulty breathing, blurry vision or any other signs and symptoms that may be concerning to you. Please follow-up with your primary care doctor within 1 day. All questions were answered prior to discharge. Thank you for choosing [facility] for your care.

## 2023-10-05 NOTE — RESULT ENCOUNTER NOTE
Results are normal  Please notify patient If you are a smoker, it is important for your health to stop smoking. Please be aware that second hand smoke is also harmful. no

## 2024-02-21 PROBLEM — Z01.419 WOMEN'S ANNUAL ROUTINE GYNECOLOGICAL EXAMINATION: Status: RESOLVED | Noted: 2022-06-29 | Resolved: 2024-02-21

## 2024-02-21 PROBLEM — Z01.419 ENCOUNTER FOR ANNUAL ROUTINE GYNECOLOGICAL EXAMINATION: Status: RESOLVED | Noted: 2023-07-20 | Resolved: 2024-02-21

## 2024-02-26 ENCOUNTER — APPOINTMENT (OUTPATIENT)
Dept: LAB | Facility: CLINIC | Age: 29
End: 2024-02-26
Payer: COMMERCIAL

## 2024-02-26 ENCOUNTER — TRANSCRIBE ORDERS (OUTPATIENT)
Dept: LAB | Facility: CLINIC | Age: 29
End: 2024-02-26

## 2024-02-26 DIAGNOSIS — E50.9 VITAMIN A DEFICIENCY, UNSPECIFIED: ICD-10-CM

## 2024-02-26 DIAGNOSIS — E56.9 MULTIPLE VITAMIN DEFICIENCY DISEASE: ICD-10-CM

## 2024-02-26 DIAGNOSIS — K90.9 ABNORMAL INTESTINAL ABSORPTION: ICD-10-CM

## 2024-02-26 DIAGNOSIS — E55.9 VITAMIN D DEFICIENCY, UNSPECIFIED: ICD-10-CM

## 2024-02-26 DIAGNOSIS — R53.83 OTHER FATIGUE: Primary | ICD-10-CM

## 2024-02-26 DIAGNOSIS — R53.83 OTHER FATIGUE: ICD-10-CM

## 2024-02-26 DIAGNOSIS — Z98.84 BARIATRIC SURGERY STATUS: ICD-10-CM

## 2024-02-26 LAB
25(OH)D3 SERPL-MCNC: 17.9 NG/ML (ref 30–100)
ALBUMIN SERPL BCP-MCNC: 4 G/DL (ref 3.5–5)
ALP SERPL-CCNC: 54 U/L (ref 34–104)
ALT SERPL W P-5'-P-CCNC: 10 U/L (ref 7–52)
ANION GAP SERPL CALCULATED.3IONS-SCNC: 8 MMOL/L
AST SERPL W P-5'-P-CCNC: 11 U/L (ref 13–39)
BASOPHILS # BLD AUTO: 0.06 THOUSANDS/ÂΜL (ref 0–0.1)
BASOPHILS NFR BLD AUTO: 1 % (ref 0–1)
BILIRUB SERPL-MCNC: 0.59 MG/DL (ref 0.2–1)
BUN SERPL-MCNC: 17 MG/DL (ref 5–25)
CALCIUM SERPL-MCNC: 9.5 MG/DL (ref 8.4–10.2)
CHLORIDE SERPL-SCNC: 103 MMOL/L (ref 96–108)
CO2 SERPL-SCNC: 26 MMOL/L (ref 21–32)
CREAT SERPL-MCNC: 0.76 MG/DL (ref 0.6–1.3)
EOSINOPHIL # BLD AUTO: 0.19 THOUSAND/ÂΜL (ref 0–0.61)
EOSINOPHIL NFR BLD AUTO: 3 % (ref 0–6)
ERYTHROCYTE [DISTWIDTH] IN BLOOD BY AUTOMATED COUNT: 12.3 % (ref 11.6–15.1)
FERRITIN SERPL-MCNC: 43 NG/ML (ref 11–307)
FOLATE SERPL-MCNC: 8.3 NG/ML
GFR SERPL CREATININE-BSD FRML MDRD: 105 ML/MIN/1.73SQ M
GLUCOSE P FAST SERPL-MCNC: 81 MG/DL (ref 65–99)
HCT VFR BLD AUTO: 44.2 % (ref 34.8–46.1)
HGB BLD-MCNC: 14.3 G/DL (ref 11.5–15.4)
IMM GRANULOCYTES # BLD AUTO: 0.01 THOUSAND/UL (ref 0–0.2)
IMM GRANULOCYTES NFR BLD AUTO: 0 % (ref 0–2)
INR PPP: 1.12 (ref 0.84–1.19)
LYMPHOCYTES # BLD AUTO: 2.15 THOUSANDS/ÂΜL (ref 0.6–4.47)
LYMPHOCYTES NFR BLD AUTO: 35 % (ref 14–44)
MCH RBC QN AUTO: 28.3 PG (ref 26.8–34.3)
MCHC RBC AUTO-ENTMCNC: 32.4 G/DL (ref 31.4–37.4)
MCV RBC AUTO: 87 FL (ref 82–98)
MONOCYTES # BLD AUTO: 0.39 THOUSAND/ÂΜL (ref 0.17–1.22)
MONOCYTES NFR BLD AUTO: 6 % (ref 4–12)
NEUTROPHILS # BLD AUTO: 3.37 THOUSANDS/ÂΜL (ref 1.85–7.62)
NEUTS SEG NFR BLD AUTO: 55 % (ref 43–75)
NRBC BLD AUTO-RTO: 0 /100 WBCS
PLATELET # BLD AUTO: 259 THOUSANDS/UL (ref 149–390)
PMV BLD AUTO: 10.7 FL (ref 8.9–12.7)
POTASSIUM SERPL-SCNC: 4 MMOL/L (ref 3.5–5.3)
PROT SERPL-MCNC: 7.6 G/DL (ref 6.4–8.4)
PROTHROMBIN TIME: 14.2 SECONDS (ref 11.6–14.5)
RBC # BLD AUTO: 5.06 MILLION/UL (ref 3.81–5.12)
SODIUM SERPL-SCNC: 137 MMOL/L (ref 135–147)
TSH SERPL DL<=0.05 MIU/L-ACNC: 1.73 UIU/ML (ref 0.45–4.5)
VIT B12 SERPL-MCNC: 174 PG/ML (ref 180–914)
WBC # BLD AUTO: 6.17 THOUSAND/UL (ref 4.31–10.16)

## 2024-02-26 PROCEDURE — 84425 ASSAY OF VITAMIN B-1: CPT

## 2024-02-26 PROCEDURE — 84207 ASSAY OF VITAMIN B-6: CPT

## 2024-02-26 PROCEDURE — 80053 COMPREHEN METABOLIC PANEL: CPT

## 2024-02-26 PROCEDURE — 82306 VITAMIN D 25 HYDROXY: CPT

## 2024-02-26 PROCEDURE — 84443 ASSAY THYROID STIM HORMONE: CPT

## 2024-02-26 PROCEDURE — 84590 ASSAY OF VITAMIN A: CPT

## 2024-02-26 PROCEDURE — 85610 PROTHROMBIN TIME: CPT

## 2024-02-26 PROCEDURE — 84446 ASSAY OF VITAMIN E: CPT

## 2024-02-26 PROCEDURE — 85025 COMPLETE CBC W/AUTO DIFF WBC: CPT

## 2024-02-26 PROCEDURE — 36415 COLL VENOUS BLD VENIPUNCTURE: CPT

## 2024-02-26 PROCEDURE — 82607 VITAMIN B-12: CPT

## 2024-02-26 PROCEDURE — 82728 ASSAY OF FERRITIN: CPT

## 2024-02-26 PROCEDURE — 82746 ASSAY OF FOLIC ACID SERUM: CPT

## 2024-02-26 PROCEDURE — 82180 ASSAY OF ASCORBIC ACID: CPT

## 2024-03-01 LAB — VIT C SERPL-MCNC: 0.2 MG/DL (ref 0.4–2)

## 2024-03-03 LAB
A-TOCOPHEROL VIT E SERPL-MCNC: 11 MG/L (ref 5.9–19.4)
GAMMA TOCOPHEROL SERPL-MCNC: 1 MG/L (ref 0.7–4.9)
VIT A SERPL-MCNC: 48.2 UG/DL (ref 18.9–57.3)
VIT B1 BLD-SCNC: 115.3 NMOL/L (ref 66.5–200)

## 2024-03-05 LAB — VIT B6 SERPL-MCNC: 6.6 UG/L (ref 3.4–65.2)

## (undated) DEVICE — SCD SEQUENTIAL COMPRESSION COMFORT SLEEVE MEDIUM KNEE LENGTH: Brand: KENDALL SCD

## (undated) DEVICE — ENDOPATH XCEL BLADELESS TROCARS WITH STABILITY SLEEVES: Brand: ENDOPATH XCEL

## (undated) DEVICE — VIAL DECANTER

## (undated) DEVICE — REDUCER: Brand: ENDOWRIST

## (undated) DEVICE — STAPLER 60: Brand: SUREFORM

## (undated) DEVICE — COLUMN DRAPE

## (undated) DEVICE — STAPLER CANNULA SEAL: Brand: ENDOWRIST

## (undated) DEVICE — CANNULA SEAL

## (undated) DEVICE — VIOLET BRAIDED (POLYGLACTIN 910), SYNTHETIC ABSORBABLE SUTURE: Brand: COATED VICRYL

## (undated) DEVICE — PLUMEPEN PRO 10FT

## (undated) DEVICE — WEBRIL 6 IN UNSTERILE

## (undated) DEVICE — ASTOUND STANDARD SURGICAL GOWN, XL: Brand: CONVERTORS

## (undated) DEVICE — STRL COTTON TIP APPLCTR 6IN PK: Brand: CARDINAL HEALTH

## (undated) DEVICE — GLOVE SRG BIOGEL 7.5

## (undated) DEVICE — BAG DECANTER

## (undated) DEVICE — TUBE SET SMOKE EVAC PNEUMOCLEAR HIGH FLOW

## (undated) DEVICE — DRAPE EQUIPMENT RF WAND

## (undated) DEVICE — VESSEL SEALER EXTEND: Brand: ENDOWRIST

## (undated) DEVICE — VISIGI 3D®  CALIBRATION SYSTEM  SIZE 36FR SLEEVE/STD: Brand: BOEHRINGER® VISIGI 3D™ SLEEVE GASTRECTOMY CALIBRATION SYSTEM, SIZE 36FR

## (undated) DEVICE — TROCAR: Brand: KII FIOS FIRST ENTRY

## (undated) DEVICE — KIT, ROBOTIC BARIATRIC: Brand: CARDINAL HEALTH

## (undated) DEVICE — BLADELESS OBTURATOR: Brand: WECK VISTA

## (undated) DEVICE — CADIERE FORCEPS: Brand: ENDOWRIST

## (undated) DEVICE — STAPLER 60 RELOAD BLUE: Brand: SUREFORM

## (undated) DEVICE — ARM DRAPE

## (undated) DEVICE — ABSORBABLE WOUND CLOSURE DEVICE: Brand: V-LOC 90

## (undated) DEVICE — SUT MONOCRYL 4-0 PS-2 27 IN Y426H

## (undated) DEVICE — STAPLER 60 RELOAD GREEN: Brand: SUREFORM